# Patient Record
Sex: MALE | Race: BLACK OR AFRICAN AMERICAN | NOT HISPANIC OR LATINO | URBAN - METROPOLITAN AREA
[De-identification: names, ages, dates, MRNs, and addresses within clinical notes are randomized per-mention and may not be internally consistent; named-entity substitution may affect disease eponyms.]

---

## 2024-08-09 ENCOUNTER — EMERGENCY (EMERGENCY)
Facility: HOSPITAL | Age: 64
LOS: 1 days | Discharge: HOME | End: 2024-08-09
Attending: STUDENT IN AN ORGANIZED HEALTH CARE EDUCATION/TRAINING PROGRAM
Payer: SELF-PAY

## 2024-08-09 VITALS
SYSTOLIC BLOOD PRESSURE: 165 MMHG | HEIGHT: 72 IN | TEMPERATURE: 99 F | RESPIRATION RATE: 20 BRPM | OXYGEN SATURATION: 96 % | WEIGHT: 210.98 LBS | HEART RATE: 78 BPM | DIASTOLIC BLOOD PRESSURE: 84 MMHG

## 2024-08-09 VITALS
TEMPERATURE: 98 F | HEART RATE: 69 BPM | DIASTOLIC BLOOD PRESSURE: 91 MMHG | OXYGEN SATURATION: 98 % | RESPIRATION RATE: 18 BRPM | SYSTOLIC BLOOD PRESSURE: 157 MMHG

## 2024-08-09 LAB
ALBUMIN SERPL ELPH-MCNC: 4.5 G/DL — SIGNIFICANT CHANGE UP (ref 3.3–5.2)
ALP SERPL-CCNC: 88 U/L — SIGNIFICANT CHANGE UP (ref 40–120)
ALT FLD-CCNC: 13 U/L — SIGNIFICANT CHANGE UP
ANION GAP SERPL CALC-SCNC: 15 MMOL/L — SIGNIFICANT CHANGE UP (ref 5–17)
APPEARANCE UR: CLEAR — SIGNIFICANT CHANGE UP
AST SERPL-CCNC: 22 U/L — SIGNIFICANT CHANGE UP
BACTERIA # UR AUTO: NEGATIVE /HPF — SIGNIFICANT CHANGE UP
BASOPHILS # BLD AUTO: 0.07 K/UL — SIGNIFICANT CHANGE UP (ref 0–0.2)
BASOPHILS NFR BLD AUTO: 1 % — SIGNIFICANT CHANGE UP (ref 0–2)
BILIRUB SERPL-MCNC: 0.3 MG/DL — LOW (ref 0.4–2)
BILIRUB UR-MCNC: NEGATIVE — SIGNIFICANT CHANGE UP
BUN SERPL-MCNC: 7 MG/DL — LOW (ref 8–20)
CALCIUM SERPL-MCNC: 9.3 MG/DL — SIGNIFICANT CHANGE UP (ref 8.4–10.5)
CAST: 0 /LPF — SIGNIFICANT CHANGE UP (ref 0–4)
CHLORIDE SERPL-SCNC: 95 MMOL/L — LOW (ref 96–108)
CO2 SERPL-SCNC: 24 MMOL/L — SIGNIFICANT CHANGE UP (ref 22–29)
COLOR SPEC: YELLOW — SIGNIFICANT CHANGE UP
CREAT SERPL-MCNC: 0.71 MG/DL — SIGNIFICANT CHANGE UP (ref 0.5–1.3)
DIFF PNL FLD: NEGATIVE — SIGNIFICANT CHANGE UP
EGFR: 103 ML/MIN/1.73M2 — SIGNIFICANT CHANGE UP
EOSINOPHIL # BLD AUTO: 0.22 K/UL — SIGNIFICANT CHANGE UP (ref 0–0.5)
EOSINOPHIL NFR BLD AUTO: 3.2 % — SIGNIFICANT CHANGE UP (ref 0–6)
GLUCOSE SERPL-MCNC: 182 MG/DL — HIGH (ref 70–99)
GLUCOSE UR QL: NEGATIVE MG/DL — SIGNIFICANT CHANGE UP
HCT VFR BLD CALC: 36.3 % — LOW (ref 39–50)
HGB BLD-MCNC: 12.3 G/DL — LOW (ref 13–17)
IMM GRANULOCYTES NFR BLD AUTO: 0.3 % — SIGNIFICANT CHANGE UP (ref 0–0.9)
KETONES UR-MCNC: NEGATIVE MG/DL — SIGNIFICANT CHANGE UP
LEUKOCYTE ESTERASE UR-ACNC: NEGATIVE — SIGNIFICANT CHANGE UP
LYMPHOCYTES # BLD AUTO: 2.12 K/UL — SIGNIFICANT CHANGE UP (ref 1–3.3)
LYMPHOCYTES # BLD AUTO: 30.6 % — SIGNIFICANT CHANGE UP (ref 13–44)
MCHC RBC-ENTMCNC: 28.4 PG — SIGNIFICANT CHANGE UP (ref 27–34)
MCHC RBC-ENTMCNC: 33.9 GM/DL — SIGNIFICANT CHANGE UP (ref 32–36)
MCV RBC AUTO: 83.8 FL — SIGNIFICANT CHANGE UP (ref 80–100)
MONOCYTES # BLD AUTO: 0.71 K/UL — SIGNIFICANT CHANGE UP (ref 0–0.9)
MONOCYTES NFR BLD AUTO: 10.3 % — SIGNIFICANT CHANGE UP (ref 2–14)
NEUTROPHILS # BLD AUTO: 3.78 K/UL — SIGNIFICANT CHANGE UP (ref 1.8–7.4)
NEUTROPHILS NFR BLD AUTO: 54.6 % — SIGNIFICANT CHANGE UP (ref 43–77)
NITRITE UR-MCNC: NEGATIVE — SIGNIFICANT CHANGE UP
PH UR: 6 — SIGNIFICANT CHANGE UP (ref 5–8)
PLATELET # BLD AUTO: 207 K/UL — SIGNIFICANT CHANGE UP (ref 150–400)
POTASSIUM SERPL-MCNC: 4 MMOL/L — SIGNIFICANT CHANGE UP (ref 3.5–5.3)
POTASSIUM SERPL-SCNC: 4 MMOL/L — SIGNIFICANT CHANGE UP (ref 3.5–5.3)
PROT SERPL-MCNC: 7.8 G/DL — SIGNIFICANT CHANGE UP (ref 6.6–8.7)
PROT UR-MCNC: NEGATIVE MG/DL — SIGNIFICANT CHANGE UP
RBC # BLD: 4.33 M/UL — SIGNIFICANT CHANGE UP (ref 4.2–5.8)
RBC # FLD: 11.9 % — SIGNIFICANT CHANGE UP (ref 10.3–14.5)
RBC CASTS # UR COMP ASSIST: 0 /HPF — SIGNIFICANT CHANGE UP (ref 0–4)
SODIUM SERPL-SCNC: 134 MMOL/L — LOW (ref 135–145)
SP GR SPEC: 1.01 — SIGNIFICANT CHANGE UP (ref 1–1.03)
SQUAMOUS # UR AUTO: 0 /HPF — SIGNIFICANT CHANGE UP (ref 0–5)
TROPONIN T, HIGH SENSITIVITY RESULT: 12 NG/L — SIGNIFICANT CHANGE UP (ref 0–51)
TROPONIN T, HIGH SENSITIVITY RESULT: 13 NG/L — SIGNIFICANT CHANGE UP (ref 0–51)
UROBILINOGEN FLD QL: 0.2 MG/DL — SIGNIFICANT CHANGE UP (ref 0.2–1)
WBC # BLD: 6.92 K/UL — SIGNIFICANT CHANGE UP (ref 3.8–10.5)
WBC # FLD AUTO: 6.92 K/UL — SIGNIFICANT CHANGE UP (ref 3.8–10.5)
WBC UR QL: 0 /HPF — SIGNIFICANT CHANGE UP (ref 0–5)

## 2024-08-09 PROCEDURE — 80053 COMPREHEN METABOLIC PANEL: CPT

## 2024-08-09 PROCEDURE — 99285 EMERGENCY DEPT VISIT HI MDM: CPT

## 2024-08-09 PROCEDURE — 36415 COLL VENOUS BLD VENIPUNCTURE: CPT

## 2024-08-09 PROCEDURE — 71046 X-RAY EXAM CHEST 2 VIEWS: CPT | Mod: 26

## 2024-08-09 PROCEDURE — 99053 MED SERV 10PM-8AM 24 HR FAC: CPT

## 2024-08-09 PROCEDURE — 93005 ELECTROCARDIOGRAM TRACING: CPT

## 2024-08-09 PROCEDURE — 87086 URINE CULTURE/COLONY COUNT: CPT

## 2024-08-09 PROCEDURE — 85025 COMPLETE CBC W/AUTO DIFF WBC: CPT

## 2024-08-09 PROCEDURE — 93010 ELECTROCARDIOGRAM REPORT: CPT

## 2024-08-09 PROCEDURE — 81001 URINALYSIS AUTO W/SCOPE: CPT

## 2024-08-09 PROCEDURE — 71046 X-RAY EXAM CHEST 2 VIEWS: CPT

## 2024-08-09 PROCEDURE — 84484 ASSAY OF TROPONIN QUANT: CPT

## 2024-08-09 PROCEDURE — 99285 EMERGENCY DEPT VISIT HI MDM: CPT | Mod: 25

## 2024-08-09 NOTE — ED ADULT NURSE NOTE - OBJECTIVE STATEMENT
patient presents with intermittent chest pain radiating down the abdomen, pt denies sob, palpiations, fever, chills, nausea or vomiting.

## 2024-08-09 NOTE — ED ADULT TRIAGE NOTE - CHIEF COMPLAINT QUOTE
Patient presents to ED with c/o intermittent chest pain radiating down to abdomen times one month.  Per sister, since last night he has been urinating a lot.  Patient recently moved from [facility] June 2024 and patient does not have insurance.  No meds PTA  PMH:  DM, HTN

## 2024-08-09 NOTE — ED PROVIDER NOTE - CARE PLAN
Principal Discharge DX:	Dysuria  Secondary Diagnosis:	Chest pain  Secondary Diagnosis:	Abdominal pain   1

## 2024-08-09 NOTE — ED PROVIDER NOTE - OBJECTIVE STATEMENT
64yo M pmh dm, htn, hld, recent emigration from [facility], presents to ED c/o dysuria and urinary freq/urgency x1m. pt has been taking flagyl x2d given to him by family member but reports sx have worsened. denies hematuria, back pain. pt also c/o central CP which radiates to abdomen x1-2mo. pt reports experiences  episodes of cp and abdominal pain about 4x/w. episodes last for few min and resolve on own. pt was dx with h.pylori few months ago in [facility]. pt reports was given a pill for infection which temporarily improved sx. pt denies fever, cough, nasal congestion, palpitations, SOB, calf/leg pain or edema, orthopnea, NVD, lightheadedness/dizziness, weakness, smoking hx

## 2024-08-09 NOTE — ED PROVIDER NOTE - PHYSICAL EXAMINATION
General: non-toxic appearing, in no acute distress, A+Ox3  CV: RRR, nl s1/s2.  Resp: CTAB, normal rate and effort  GI: Abdomen soft, NT, ND. Active bowel sounds. No rebound, no guarding  : No CVAT.   Skin: No rashes, lacerations, abrasions, ecchymosis. intact and perfused.

## 2024-08-09 NOTE — ED PROVIDER NOTE - ATTENDING APP SHARED VISIT CONTRIBUTION OF CARE
62yo M pmh dm, htn, hld,  here with multiple complaints. Reports dysuria but no hematuria. Also with chest pain/epigastric pain for a few days intermittently in nature. Recently immigrated from [facility] and has not established pmd care here. Reports recently treated for H.pylori. Denies cardiac history  Ap - nontoxic appearing. no complaints currently. will check labs, cardiac enzymes, UA. reassess

## 2024-08-09 NOTE — ED ADULT NURSE NOTE - BOWEL SOUNDS RUQ
Physical Therapy        Physical Therapy Cancel Note      DATE: 3/18/2023    NAME: Blanka Gruber  MRN: [id number]   : 1959      Patient not seen this date for Physical Therapy due to:    Pt not appropriate at this time per RN- pt is agitated.        Electronically signed by Filemon Gaitan PT on 3/18/2023 at 10:50 AM present

## 2024-08-09 NOTE — ED ADULT NURSE NOTE - CHIEF COMPLAINT QUOTE
Patient presents to ED with c/o intermittent chest pain radiating down to abdomen times one month.  Per sister, since last night he has been urinating a lot.  Patient recently moved from [facility] June 2024 and patient does not have insurance.  No meds PTA  PMH:  DM, HTN

## 2024-08-09 NOTE — ED PROVIDER NOTE - NSFOLLOWUPCLINICS_GEN_ALL_ED_FT
[address] [address]  Phone: [phone]  Fax:     Cardiology at [place] ([facility])  Cardiology  [address] [facility] [facility] [address]  [place] [address]  Phone: [phone]  Fax:

## 2024-08-09 NOTE — ED PROVIDER NOTE - PATIENT PORTAL LINK FT
You can access the FollowMyHealth Patient Portal offered by [facility] by registering at the following website: [email or web address]. By joining [facility]’s FollowMyHealth portal, you will also be able to view your health information using other applications (apps) compatible with our system.

## 2024-08-09 NOTE — ED PROVIDER NOTE - CARE PROVIDER_API CALL
Reji Escobar  Gastroenterology  [address] [facility] [facility]  [place] [address]  Phone: [phone]  Fax: [phone]  Follow Up Time:

## 2024-08-10 LAB
CULTURE RESULTS: SIGNIFICANT CHANGE UP
SPECIMEN SOURCE: SIGNIFICANT CHANGE UP

## 2024-10-27 ENCOUNTER — INPATIENT (INPATIENT)
Facility: HOSPITAL | Age: 64
LOS: 13 days | Discharge: HOME | DRG: 645 | End: 2024-11-10
Attending: STUDENT IN AN ORGANIZED HEALTH CARE EDUCATION/TRAINING PROGRAM | Admitting: STUDENT IN AN ORGANIZED HEALTH CARE EDUCATION/TRAINING PROGRAM
Payer: COMMERCIAL

## 2024-10-27 VITALS
SYSTOLIC BLOOD PRESSURE: 158 MMHG | TEMPERATURE: 99 F | RESPIRATION RATE: 18 BRPM | DIASTOLIC BLOOD PRESSURE: 84 MMHG | WEIGHT: 220.02 LBS | OXYGEN SATURATION: 94 % | HEART RATE: 99 BPM | HEIGHT: 68 IN

## 2024-10-27 LAB
ALBUMIN SERPL ELPH-MCNC: 4.3 G/DL — SIGNIFICANT CHANGE UP (ref 3.3–5.2)
ALP SERPL-CCNC: 71 U/L — SIGNIFICANT CHANGE UP (ref 40–120)
ALT FLD-CCNC: 14 U/L — SIGNIFICANT CHANGE UP
ANION GAP SERPL CALC-SCNC: 18 MMOL/L — HIGH (ref 5–17)
APPEARANCE UR: CLEAR — SIGNIFICANT CHANGE UP
APTT BLD: 31.1 SEC — SIGNIFICANT CHANGE UP (ref 24.5–35.6)
AST SERPL-CCNC: 41 U/L — HIGH
BASOPHILS # BLD AUTO: 0.04 K/UL — SIGNIFICANT CHANGE UP (ref 0–0.2)
BASOPHILS NFR BLD AUTO: 0.6 % — SIGNIFICANT CHANGE UP (ref 0–2)
BILIRUB SERPL-MCNC: 0.8 MG/DL — SIGNIFICANT CHANGE UP (ref 0.4–2)
BILIRUB UR-MCNC: NEGATIVE — SIGNIFICANT CHANGE UP
BUN SERPL-MCNC: 11.6 MG/DL — SIGNIFICANT CHANGE UP (ref 8–20)
CALCIUM SERPL-MCNC: 8.9 MG/DL — SIGNIFICANT CHANGE UP (ref 8.4–10.5)
CHLORIDE SERPL-SCNC: 93 MMOL/L — LOW (ref 96–108)
CO2 SERPL-SCNC: 21 MMOL/L — LOW (ref 22–29)
COLOR SPEC: YELLOW — SIGNIFICANT CHANGE UP
CREAT SERPL-MCNC: 1.08 MG/DL — SIGNIFICANT CHANGE UP (ref 0.5–1.3)
DIFF PNL FLD: NEGATIVE — SIGNIFICANT CHANGE UP
EGFR: 77 ML/MIN/1.73M2 — SIGNIFICANT CHANGE UP
EOSINOPHIL # BLD AUTO: 0.41 K/UL — SIGNIFICANT CHANGE UP (ref 0–0.5)
EOSINOPHIL NFR BLD AUTO: 6.6 % — HIGH (ref 0–6)
FLUAV AG NPH QL: SIGNIFICANT CHANGE UP
FLUBV AG NPH QL: SIGNIFICANT CHANGE UP
GLUCOSE SERPL-MCNC: 168 MG/DL — HIGH (ref 70–99)
GLUCOSE UR QL: NEGATIVE MG/DL — SIGNIFICANT CHANGE UP
HCT VFR BLD CALC: 35.6 % — LOW (ref 39–50)
HGB BLD-MCNC: 11.9 G/DL — LOW (ref 13–17)
IMM GRANULOCYTES NFR BLD AUTO: 0.3 % — SIGNIFICANT CHANGE UP (ref 0–0.9)
INR BLD: 1.18 RATIO — HIGH (ref 0.85–1.16)
KETONES UR-MCNC: 15 MG/DL
LEUKOCYTE ESTERASE UR-ACNC: NEGATIVE — SIGNIFICANT CHANGE UP
LYMPHOCYTES # BLD AUTO: 1.09 K/UL — SIGNIFICANT CHANGE UP (ref 1–3.3)
LYMPHOCYTES # BLD AUTO: 17.7 % — SIGNIFICANT CHANGE UP (ref 13–44)
MAGNESIUM SERPL-MCNC: 1.2 MG/DL — LOW (ref 1.6–2.6)
MCHC RBC-ENTMCNC: 28.4 PG — SIGNIFICANT CHANGE UP (ref 27–34)
MCHC RBC-ENTMCNC: 33.4 GM/DL — SIGNIFICANT CHANGE UP (ref 32–36)
MCV RBC AUTO: 85 FL — SIGNIFICANT CHANGE UP (ref 80–100)
MONOCYTES # BLD AUTO: 1.24 K/UL — HIGH (ref 0–0.9)
MONOCYTES NFR BLD AUTO: 20.1 % — HIGH (ref 2–14)
NEUTROPHILS # BLD AUTO: 3.37 K/UL — SIGNIFICANT CHANGE UP (ref 1.8–7.4)
NEUTROPHILS NFR BLD AUTO: 54.7 % — SIGNIFICANT CHANGE UP (ref 43–77)
NITRITE UR-MCNC: NEGATIVE — SIGNIFICANT CHANGE UP
PH UR: 6 — SIGNIFICANT CHANGE UP (ref 5–8)
PLATELET # BLD AUTO: 172 K/UL — SIGNIFICANT CHANGE UP (ref 150–400)
POTASSIUM SERPL-MCNC: 4.4 MMOL/L — SIGNIFICANT CHANGE UP (ref 3.5–5.3)
POTASSIUM SERPL-SCNC: 4.4 MMOL/L — SIGNIFICANT CHANGE UP (ref 3.5–5.3)
PROT SERPL-MCNC: 7.9 G/DL — SIGNIFICANT CHANGE UP (ref 6.6–8.7)
PROT UR-MCNC: SIGNIFICANT CHANGE UP MG/DL
PROTHROM AB SERPL-ACNC: 13.7 SEC — HIGH (ref 9.9–13.4)
RBC # BLD: 4.19 M/UL — LOW (ref 4.2–5.8)
RBC # FLD: 11.6 % — SIGNIFICANT CHANGE UP (ref 10.3–14.5)
RSV RNA NPH QL NAA+NON-PROBE: SIGNIFICANT CHANGE UP
SARS-COV-2 RNA SPEC QL NAA+PROBE: SIGNIFICANT CHANGE UP
SODIUM SERPL-SCNC: 132 MMOL/L — LOW (ref 135–145)
SP GR SPEC: 1.02 — SIGNIFICANT CHANGE UP (ref 1–1.03)
UROBILINOGEN FLD QL: 1 MG/DL — SIGNIFICANT CHANGE UP (ref 0.2–1)
WBC # BLD: 6.17 K/UL — SIGNIFICANT CHANGE UP (ref 3.8–10.5)
WBC # FLD AUTO: 6.17 K/UL — SIGNIFICANT CHANGE UP (ref 3.8–10.5)

## 2024-10-27 PROCEDURE — 99223 1ST HOSP IP/OBS HIGH 75: CPT

## 2024-10-27 PROCEDURE — 70450 CT HEAD/BRAIN W/O DYE: CPT | Mod: 26,59,MC

## 2024-10-27 PROCEDURE — 70498 CT ANGIOGRAPHY NECK: CPT | Mod: 26,MC

## 2024-10-27 PROCEDURE — 71045 X-RAY EXAM CHEST 1 VIEW: CPT | Mod: 26

## 2024-10-27 PROCEDURE — 70496 CT ANGIOGRAPHY HEAD: CPT | Mod: 26,MC

## 2024-10-27 RX ORDER — SODIUM CHLORIDE 9 MG/ML
500 INJECTION, SOLUTION INTRAMUSCULAR; INTRAVENOUS; SUBCUTANEOUS ONCE
Refills: 0 | Status: COMPLETED | OUTPATIENT
Start: 2024-10-27 | End: 2024-10-27

## 2024-10-27 RX ORDER — INSULIN LISPRO 100/ML
VIAL (ML) SUBCUTANEOUS AT BEDTIME
Refills: 0 | Status: DISCONTINUED | OUTPATIENT
Start: 2024-10-27 | End: 2024-11-06

## 2024-10-27 RX ORDER — GLUCAGON INJECTION, SOLUTION 1 MG/.2ML
1 INJECTION, SOLUTION SUBCUTANEOUS ONCE
Refills: 0 | Status: DISCONTINUED | OUTPATIENT
Start: 2024-10-27 | End: 2024-11-06

## 2024-10-27 RX ORDER — INSULIN LISPRO 100/ML
VIAL (ML) SUBCUTANEOUS
Refills: 0 | Status: DISCONTINUED | OUTPATIENT
Start: 2024-10-27 | End: 2024-11-06

## 2024-10-27 RX ORDER — METOPROLOL TARTRATE 50 MG
50 TABLET ORAL
Refills: 0 | Status: DISCONTINUED | OUTPATIENT
Start: 2024-10-27 | End: 2024-11-06

## 2024-10-27 RX ORDER — NIFEDIPINE 90 MG
60 TABLET, EXTENDED RELEASE 24 HR ORAL DAILY
Refills: 0 | Status: DISCONTINUED | OUTPATIENT
Start: 2024-10-27 | End: 2024-11-06

## 2024-10-27 RX ORDER — PANTOPRAZOLE SODIUM 40 MG/1
40 TABLET, DELAYED RELEASE ORAL
Refills: 0 | Status: DISCONTINUED | OUTPATIENT
Start: 2024-10-27 | End: 2024-11-06

## 2024-10-27 RX ORDER — LOSARTAN POTASSIUM 25 MG/1
50 TABLET ORAL DAILY
Refills: 0 | Status: DISCONTINUED | OUTPATIENT
Start: 2024-10-27 | End: 2024-11-06

## 2024-10-27 RX ORDER — MAGNESIUM SULFATE IN 0.9% NACL 2 G/50 ML
2 INTRAVENOUS SOLUTION, PIGGYBACK (ML) INTRAVENOUS ONCE
Refills: 0 | Status: COMPLETED | OUTPATIENT
Start: 2024-10-27 | End: 2024-10-27

## 2024-10-27 RX ORDER — ACETAMINOPHEN 500 MG
650 TABLET ORAL ONCE
Refills: 0 | Status: COMPLETED | OUTPATIENT
Start: 2024-10-27 | End: 2024-10-27

## 2024-10-27 RX ADMIN — Medication 650 MILLIGRAM(S): at 14:52

## 2024-10-27 RX ADMIN — Medication 650 MILLIGRAM(S): at 15:30

## 2024-10-27 RX ADMIN — SODIUM CHLORIDE 500 MILLILITER(S): 9 INJECTION, SOLUTION INTRAMUSCULAR; INTRAVENOUS; SUBCUTANEOUS at 19:00

## 2024-10-27 RX ADMIN — SODIUM CHLORIDE 500 MILLILITER(S): 9 INJECTION, SOLUTION INTRAMUSCULAR; INTRAVENOUS; SUBCUTANEOUS at 18:00

## 2024-10-27 NOTE — ED CDU PROVIDER INITIAL DAY NOTE - NSICDXPASTMEDICALHX_GEN_ALL_CORE_FT
PAST MEDICAL HISTORY:  DM (diabetes mellitus)     GERD (gastroesophageal reflux disease)     H/O: pituitary tumor     HLD (hyperlipidemia)     HTN (hypertension)

## 2024-10-27 NOTE — CONSULT NOTE ADULT - SUBJECTIVE AND OBJECTIVE BOX
HPI:  63M with PMH of DM, HTN, HLD, GERD, on ASA81 daily, [facility], presents with generalized weakness x 1 day. Patient reports difficulty getting out of his chair with unsteady gait since this morning. Patient currently c/o mild headache and burning with urination. Denies any recent falls/trauma, vision changes, dizziness, nausea/vomiting, numbness, or tingling. 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    PAST MEDICAL & SURGICAL HISTORY:  DM  HTN  HLD  GERD    Allergies  No Known Allergies    REVIEW OF SYSTEMS  Negative except as noted in HPI    HOME MEDICATIONS:  Home Medications:  Nifedipine 20 daily  Metformin 500 daily  Simvastatin 20 daily  Losartan 50 daily    Vital Signs Last 24 Hrs  T(C): 37.1 (27 Oct 2024 16:37), Max: 37.2 (27 Oct 2024 12:17)  T(F): 98.7 (27 Oct 2024 16:37), Max: 98.9 (27 Oct 2024 12:17)  HR: 98 (27 Oct 2024 16:37) (98 - 99)  BP: 130/68 (27 Oct 2024 16:37) (130/68 - 158/84)  BP(mean): --  RR: 16 (27 Oct 2024 16:37) (16 - 18)  SpO2: 96% (27 Oct 2024 16:37) (94% - 96%)    Parameters below as of 27 Oct 2024 16:37  Patient On (Oxygen Delivery Method): room air    PHYSICAL EXAM:  GENERAL: NAD  HEAD: Atraumatic, normocephalic  AMARIS COMA SCORE: E-4 V-5 M-6 = 15  MENTAL STATUS: AAO x3; Awake; Opens eyes spontaneously; Appropriately conversant without aphasia; following simple commands  CRANIAL NERVES: Visual fields full to confrontation, PERRL. EOMI without nystagmus. Facial sensation intact V1-3 distribution b/l. Face symmetric w/ normal eye closure and smile, tongue midline. Hearing grossly intact. Speech clear.  MOTOR: strength 5/5 b/l upper and lower extremities  SENSATION: grossly intact to light touch all extremities  CHEST/LUNG: Nonlabored breathing  SKIN: Warm, dry    LABS:                        11.9   6.17  )-----------( 172      ( 27 Oct 2024 13:59 )             35.6     10-27    132[L]  |  93[L]  |  11.6  ----------------------------<  168[H]  4.4   |  21.0[L]  |  1.08    Ca    8.9      27 Oct 2024 13:59  Mg     1.2     10-27    TPro  7.9  /  Alb  4.3  /  TBili  0.8  /  DBili  x   /  AST  41[H]  /  ALT  14  /  AlkPhos  71  10-27    PT/INR - ( 27 Oct 2024 16:49 )   PT: 13.7 sec;   INR: 1.18 ratio      PTT - ( 27 Oct 2024 16:49 )  PTT:31.1 sec  Urinalysis Basic - ( 27 Oct 2024 16:49 )    Color: Yellow / Appearance: Clear / S.016 / pH: x  Gluc: x / Ketone: 15 mg/dL  / Bili: Negative / Urobili: 1.0 mg/dL   Blood: x / Protein: Trace mg/dL / Nitrite: Negative   Leuk Esterase: Negative / RBC: x / WBC x   Sq Epi: x / Non Sq Epi: x / Bacteria: x    RADIOLOGY & ADDITIONAL STUDIES:  < from: CT Head No Cont (10.27.24 @ 17:03) >  IMPRESSION:    1.  Large pituitary tumor with chiasmatic encroachment, consider   pituitary MRI.  2.  Invasion of the cavernous sinuses and sphenoid sinuses.  3.  Incidental vascular lesion dorsal to the right thyroid.

## 2024-10-27 NOTE — ED ADULT NURSE NOTE - NSFALLUNIVINTERV_ED_ALL_ED
Bed/Stretcher in lowest position, wheels locked, appropriate side rails in place/Call bell, personal items and telephone in reach/Instruct patient to call for assistance before getting out of bed/chair/stretcher/Non-slip footwear applied when patient is off stretcher/[place] to call system/Physically safe environment - no spills, clutter or unnecessary equipment/Purposeful proactive rounding/Room/bathroom lighting operational, light cord in reach

## 2024-10-27 NOTE — ED ADULT NURSE REASSESSMENT NOTE - NURSING NEURO LEVEL OF CONSCIOUSNESS
This writer attempted to contact patient on 03/01/23      Reason for call relay provider message and left message.      If patient calls back:   Registered Nurse called. Route to BK RN line, relay provider message below stating that since specialist recommended CT, PCP is deferring to specialist for preferred scan. OK with CT. CT already ordered, call imaging scheduling at [phone] to schedule CT        LUAN Franks, RN  [facility]     follows commands

## 2024-10-27 NOTE — ED PROVIDER NOTE - CLINICAL SUMMARY MEDICAL DECISION MAKING FREE TEXT BOX
Ddx: NIHSS 0/ no evidence of cva/ ro occult infection/ progression of known tumor  Plan: Cbc, cmp, CT head, cta head and neck, cxr, ua, reassess

## 2024-10-27 NOTE — ED ADULT NURSE NOTE - OBJECTIVE STATEMENT
Pt A&Ox4, NAD. Pt presents to the ED from home with complaints of generalized weakness. Breathing even and unlabored. ED workup in progress.

## 2024-10-27 NOTE — ED PROVIDER NOTE - OBJECTIVE STATEMENT
Patient is a 63-year-old gentleman with past medical history of hypertension, hyperlipidemia, diabetes, known brain tumor who presents to the ED because of generalized weakness.  Patient sister says that he was too weak to get off the floor this morning.  Denies headache, denies fever, denies chest pain, shortness of breath, or abdominal pain.  No history of stroke in the past.  No slurred speech, no facial droop, no unilateral weakness.

## 2024-10-27 NOTE — ED CDU PROVIDER INITIAL DAY NOTE - OBJECTIVE STATEMENT
62 yo male PMHx HTN, HLD, NIDDM2, GERD, known brain tumor who presents to the ED because of generalized weakness. Patient sister says that he was too weak to get off the floor this morning. No further complaints at this time.   Denies headache, denies fever, denies chest pain, shortness of breath, or abdominal pain.  No history of stroke in the past. No slurred speech, no facial droop, no unilateral weakness. 62 yo male PMHx HTN, HLD, NIDDM2, GERD, known brain tumor who presents to the ED because of generalized weakness. Patient sister says that he was too weak to get off the floor this morning. Also c/o dysuria x 2 months. No further complaints at this time.   Denies headache, denies fever, denies chest pain, shortness of breath, or abdominal pain.  No history of stroke in the past. No slurred speech, no facial droop, no unilateral weakness.

## 2024-10-27 NOTE — ED ADULT NURSE REASSESSMENT NOTE - NSFALLRISKINTERV_ED_ALL_ED

## 2024-10-27 NOTE — CONSULT NOTE ADULT - NS ATTEND AMEND GEN_ALL_CORE FT
SU Attg:    see above    patient seen and examined by PA staff    imaging reviewed  no ICH/apoplexy    agree with above  additional evaluation and treatment per Dr. Harmon

## 2024-10-27 NOTE — ED ADULT TRIAGE NOTE - CHIEF COMPLAINT QUOTE
pt BIBA from home, as per EMS family called c/o of weakness. pt creole speaking only states he woke up this morning with extreme weakness. denies sick contact or recent travel.

## 2024-10-27 NOTE — ED CDU PROVIDER INITIAL DAY NOTE - CLINICAL SUMMARY MEDICAL DECISION MAKING FREE TEXT BOX
64 yo male PMHx HTN, HLD, NIDDM2, GERD, known brain tumor who presents to the ED because of generalized weakness. Lab work significant for hypomagnesemia, given 2g; new EKG abnormalities, initial troponin 27. pending repeat. Additionally on CT patient with large pituitary tumor. Neurosurgery consulted - recommending MRI and endocrine evaluation. Patient placed in OBS.

## 2024-10-27 NOTE — ED PROVIDER NOTE - PROGRESS NOTE DETAILS
Pt feeling better, ambulating in ED. Pt has brain tumor, and neurosurgery consulted. Recommends MRI and endocrine consult.

## 2024-10-27 NOTE — CONSULT NOTE ADULT - ASSESSMENT
63M with PMH of DM, HTN, HLD, GERD, on ASA81 daily, known history of pituitary tumor diagnosed 8 years ago in [facility] with no follow up since then, presents with generalized weakness x 1 day. Patient reports difficulty getting out of his chair with unsteady gait since this morning. Patient currently c/o mild headache and burning with urination. Denies any recent falls/trauma, vision changes, dizziness, nausea/vomiting, numbness, or tingling. 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    Plan:  - Q4 neuro checks  - SBP goal <160  - All imaging reviewed: CTH shows 2.7 x 2.7 x 3.9 cm pituitary mass with chiasmatic encroachment  - Recommend MRI Brain w/wo with pituitary focus   - Recommend Endocrine consult  - Endocrine labs (GH, Prolactin, Thyroid, ACTH, 8AM Cortisol) and BMP  - Hold steroids until 8AM Cortisol   - Obtain UA and urine culture for urinary sx  - Obtain ASA response test for ASA81 use  - Hold ASA  - DVT ppx: SCDs only  - Discussed with Dr. Mclean

## 2024-10-28 ENCOUNTER — RESULT REVIEW (OUTPATIENT)
Age: 64
End: 2024-10-28

## 2024-10-28 DIAGNOSIS — E23.6 OTHER DISORDERS OF PITUITARY GLAND: ICD-10-CM

## 2024-10-28 DIAGNOSIS — R94.31 ABNORMAL ELECTROCARDIOGRAM [ECG] [EKG]: ICD-10-CM

## 2024-10-28 DIAGNOSIS — Z01.810 ENCOUNTER FOR PREPROCEDURAL CARDIOVASCULAR EXAMINATION: ICD-10-CM

## 2024-10-28 LAB
ACTH SER-ACNC: 36.5 PG/ML — SIGNIFICANT CHANGE UP (ref 7.2–63.3)
ANION GAP SERPL CALC-SCNC: 13 MMOL/L — SIGNIFICANT CHANGE UP (ref 5–17)
BUN SERPL-MCNC: 11.2 MG/DL — SIGNIFICANT CHANGE UP (ref 8–20)
CALCIUM SERPL-MCNC: 8.3 MG/DL — LOW (ref 8.4–10.5)
CHLORIDE SERPL-SCNC: 100 MMOL/L — SIGNIFICANT CHANGE UP (ref 96–108)
CO2 SERPL-SCNC: 25 MMOL/L — SIGNIFICANT CHANGE UP (ref 22–29)
CORTIS SP2 SERPL-MCNC: 13.4 UG/DL — SIGNIFICANT CHANGE UP
CREAT SERPL-MCNC: 0.77 MG/DL — SIGNIFICANT CHANGE UP (ref 0.5–1.3)
EGFR: 101 ML/MIN/1.73M2 — SIGNIFICANT CHANGE UP
GH SERPL-MCNC: 0.05 NG/ML — SIGNIFICANT CHANGE UP (ref 0.03–2.47)
GLUCOSE BLDC GLUCOMTR-MCNC: 168 MG/DL — HIGH (ref 70–99)
GLUCOSE BLDC GLUCOMTR-MCNC: 175 MG/DL — HIGH (ref 70–99)
GLUCOSE BLDC GLUCOMTR-MCNC: 178 MG/DL — HIGH (ref 70–99)
GLUCOSE BLDC GLUCOMTR-MCNC: 276 MG/DL — HIGH (ref 70–99)
GLUCOSE SERPL-MCNC: 162 MG/DL — HIGH (ref 70–99)
PLATELET RESPONSE ASPIRIN RESULT: 473 ARU — SIGNIFICANT CHANGE UP
POTASSIUM SERPL-MCNC: 3.3 MMOL/L — LOW (ref 3.5–5.3)
POTASSIUM SERPL-SCNC: 3.3 MMOL/L — LOW (ref 3.5–5.3)
PROLACTIN SERPL-MCNC: 3.6 NG/ML — LOW (ref 4.1–18.4)
PROLACTIN SERPL-MCNC: 5.2 NG/ML — SIGNIFICANT CHANGE UP (ref 4.1–18.4)
SODIUM SERPL-SCNC: 138 MMOL/L — SIGNIFICANT CHANGE UP (ref 135–145)
TROPONIN T, HIGH SENSITIVITY RESULT: 22 NG/L — SIGNIFICANT CHANGE UP (ref 0–51)
TSH SERPL-MCNC: 0.38 UIU/ML — SIGNIFICANT CHANGE UP (ref 0.27–4.2)

## 2024-10-28 PROCEDURE — 99233 SBSQ HOSP IP/OBS HIGH 50: CPT

## 2024-10-28 PROCEDURE — 75574 CT ANGIO HRT W/3D IMAGE: CPT | Mod: 26,MC

## 2024-10-28 PROCEDURE — 70553 MRI BRAIN STEM W/O & W/DYE: CPT | Mod: 26,MC

## 2024-10-28 PROCEDURE — 99223 1ST HOSP IP/OBS HIGH 75: CPT

## 2024-10-28 PROCEDURE — 93306 TTE W/DOPPLER COMPLETE: CPT | Mod: 26

## 2024-10-28 RX ORDER — POLYETHYLENE GLYCOL 3350 17 G/17G
17 POWDER, FOR SOLUTION ORAL DAILY
Refills: 0 | Status: DISCONTINUED | OUTPATIENT
Start: 2024-10-28 | End: 2024-11-06

## 2024-10-28 RX ORDER — OXYCODONE HYDROCHLORIDE 30 MG/1
5 TABLET ORAL EVERY 6 HOURS
Refills: 0 | Status: DISCONTINUED | OUTPATIENT
Start: 2024-10-28 | End: 2024-10-28

## 2024-10-28 RX ORDER — METFORMIN HYDROCHLORIDE 500 MG/1
1 TABLET, EXTENDED RELEASE ORAL
Refills: 0 | DISCHARGE

## 2024-10-28 RX ORDER — LOSARTAN POTASSIUM 25 MG/1
1 TABLET ORAL
Refills: 0 | DISCHARGE

## 2024-10-28 RX ORDER — METOPROLOL TARTRATE 50 MG
1 TABLET ORAL
Refills: 0 | DISCHARGE

## 2024-10-28 RX ORDER — SODIUM CHLORIDE 9 MG/ML
3 INJECTION, SOLUTION INTRAMUSCULAR; INTRAVENOUS; SUBCUTANEOUS EVERY 8 HOURS
Refills: 0 | Status: DISCONTINUED | OUTPATIENT
Start: 2024-10-28 | End: 2024-11-06

## 2024-10-28 RX ORDER — SENNA 187 MG
2 TABLET ORAL AT BEDTIME
Refills: 0 | Status: DISCONTINUED | OUTPATIENT
Start: 2024-10-28 | End: 2024-11-06

## 2024-10-28 RX ORDER — NIFEDIPINE 90 MG
1 TABLET, EXTENDED RELEASE 24 HR ORAL
Refills: 0 | DISCHARGE

## 2024-10-28 RX ORDER — ACETAMINOPHEN 500 MG
650 TABLET ORAL EVERY 6 HOURS
Refills: 0 | Status: DISCONTINUED | OUTPATIENT
Start: 2024-10-28 | End: 2024-11-06

## 2024-10-28 RX ORDER — B-COMPLEX WITH VITAMIN C
1 VIAL (ML) INJECTION DAILY
Refills: 0 | Status: DISCONTINUED | OUTPATIENT
Start: 2024-10-28 | End: 2024-11-06

## 2024-10-28 RX ORDER — INFLUENZ VIR VAC TV P-SURF2003 15MCG/.5ML
0.5 SYRINGE (ML) INTRAMUSCULAR ONCE
Refills: 0 | Status: DISCONTINUED | OUTPATIENT
Start: 2024-10-28 | End: 2024-11-10

## 2024-10-28 RX ADMIN — SODIUM CHLORIDE 3 MILLILITER(S): 9 INJECTION, SOLUTION INTRAMUSCULAR; INTRAVENOUS; SUBCUTANEOUS at 22:12

## 2024-10-28 RX ADMIN — Medication 2 TABLET(S): at 22:21

## 2024-10-28 RX ADMIN — Medication 2: at 22:20

## 2024-10-28 RX ADMIN — Medication 2: at 17:50

## 2024-10-28 RX ADMIN — Medication 20 MILLIGRAM(S): at 22:21

## 2024-10-28 RX ADMIN — Medication 650 MILLIGRAM(S): at 04:26

## 2024-10-28 RX ADMIN — Medication 60 MILLIGRAM(S): at 06:26

## 2024-10-28 RX ADMIN — Medication 50 MILLIGRAM(S): at 19:43

## 2024-10-28 RX ADMIN — Medication 25 GRAM(S): at 01:01

## 2024-10-28 RX ADMIN — Medication 650 MILLIGRAM(S): at 09:19

## 2024-10-28 RX ADMIN — LOSARTAN POTASSIUM 50 MILLIGRAM(S): 25 TABLET ORAL at 06:26

## 2024-10-28 RX ADMIN — Medication 50 MILLIGRAM(S): at 06:26

## 2024-10-28 RX ADMIN — Medication 50 MILLIGRAM(S): at 01:01

## 2024-10-28 NOTE — ED CDU PROVIDER DISPOSITION NOTE - CLINICAL COURSE
64 yo male PMHx HTN, HLD, NIDDM2, GERD, known brain tumor placed in OBS after initially presenting to the ED because of generalized weakness. Lab work significant for hypomagnesemia, given 2g; new EKG abnormalities. Cardiology consulted - pending TTE and potential further ischemic eval. Additionally on CT patient with large pituitary tumor. Neurosurgery consulted - recommended MRI and endocrine evaluation. MRI showed Large pituitary tumor that expands and distorts the sella and elevates the optic chiasm. 62 yo male PMHx HTN, HLD, NIDDM2, GERD, known pituitary tumor placed in OBS after initially presenting to the ED because of generalized weakness. Lab work significant for hypomagnesemia, given 2g; found to have new EKG abnormalities. Cardiology consulted - recommended TTE and CTCA. Additionally on CT patient with large pituitary tumor. Neurosurgery consulted - recommended MRI and endocrine evaluation. MRI showed Large pituitary tumor that expands and distorts the sella and elevates the optic chiasm. Neurosurgery planning for surgery on this stay pending cardiology clearance. TTE and CTCA grossly unremarkable, pt cleared by cardiology. pt admitted to neurosurgery service.

## 2024-10-28 NOTE — PROGRESS NOTE ADULT - SUBJECTIVE AND OBJECTIVE BOX
INTERVAL HPI/OVERNIGHT EVENTS:  This is a 63ym presents with a known hx of pituitary tumor dx 8 years prior.  Pt presents with generalized weakness x 1 day. Pt had mild headache and burning with urination.    MEDICATIONS  (STANDING):  atorvastatin 20 milliGRAM(s) Oral at bedtime  dextrose 5%. 1000 milliLiter(s) (100 mL/Hr) IV Continuous <Continuous>  dextrose 5%. 1000 milliLiter(s) (50 mL/Hr) IV Continuous <Continuous>  dextrose 50% Injectable 12.5 Gram(s) IV Push once  dextrose 50% Injectable 25 Gram(s) IV Push once  dextrose 50% Injectable 25 Gram(s) IV Push once  glucagon  Injectable 1 milliGRAM(s) IntraMuscular once  insulin lispro (ADMELOG) corrective regimen sliding scale   SubCutaneous three times a day before meals  insulin lispro (ADMELOG) corrective regimen sliding scale   SubCutaneous at bedtime  losartan 50 milliGRAM(s) Oral daily  metoprolol succinate ER 50 milliGRAM(s) Oral two times a day  NIFEdipine XL 60 milliGRAM(s) Oral daily  pantoprazole    Tablet 40 milliGRAM(s) Oral before breakfast    MEDICATIONS  (PRN):  acetaminophen     Tablet .. 650 milliGRAM(s) Oral every 6 hours PRN Moderate Pain (4 - 6)  dextrose Oral Gel 15 Gram(s) Oral once PRN Blood Glucose LESS THAN 70 milliGRAM(s)/deciliter      Allergies  No Known Allergies  Intolerances      Vital Signs Last 24 Hrs  T(C): 36.4 (28 Oct 2024 07:55), Max: 37.2 (27 Oct 2024 12:17)  T(F): 97.5 (28 Oct 2024 07:55), Max: 98.9 (27 Oct 2024 12:17)  HR: 61 (28 Oct 2024 07:55) (61 - 99)  BP: 158/86 (28 Oct 2024 07:55) (130/68 - 162/88)  BP(mean): --  RR: 18 (28 Oct 2024 07:55) (16 - 19)  SpO2: 95% (28 Oct 2024 07:55) (94% - 96%)    Parameters below as of 28 Oct 2024 07:55  Patient On (Oxygen Delivery Method): room air     BMI (kg/m2): 33.5 (10-27-24 @ 12:17)      PHYSICAL EXAM  GENERAL: NAD, Pt spoken to through interpretator  HEAD:  Normocephalic  EYES: EOMI, PERRLA, conjunctiva and sclera clear, vision grossly intact.    ENMT: No tonsillar erythema, exudates, or enlargement; Moist mucous membranes, Good dentition, neg facial, midline tongue  NECK: Supple,   NERVOUS SYSTEM:  Alert & Oriented X3, Good concentration; Motor Strength 5/5 B/L upper and lower extremities; DTRs 2+ intact and symmetric  CHEST/LUNG: Clear bilaterally;  HEART: Regular rate and rhythm; No murmurs, rubs, or gallops  ABDOMEN: Soft, Nontender, Nondistended; Bowel sounds present  EXTREMITIES:  2+ Peripheral Pulses, No edema  SKIN: No rashes or lesions      LABS:                          11.9   6.17  )-----------( 172      ( 27 Oct 2024 13:59 )             35.6     10-28    138  |  100  |  11.2  ----------------------------<  162[H]  3.3[L]   |  25.0  |  0.77    Ca    8.3[L]      28 Oct 2024 06:30  Mg     1.2     10-27    TPro  7.9  /  Alb  4.3  /  TBili  0.8  /  DBili  x   /  AST  41[H]  /  ALT  14  /  AlkPhos  71  10-27    PT/INR - ( 27 Oct 2024 16:49 )   PT: 13.7 sec;   INR: 1.18 ratio         PTT - ( 27 Oct 2024 16:49 )  PTT:31.1 sec  Urinalysis Basic - ( 28 Oct 2024 06:30 )    Color: x / Appearance: x / SG: x / pH: x  Gluc: 162 mg/dL / Ketone: x  / Bili: x / Urobili: x   Blood: x / Protein: x / Nitrite: x   Leuk Esterase: x / RBC: x / WBC x   Sq Epi: x / Non Sq Epi: x / Bacteria: x    Urinalysis (10.27.24 @ 16:49)    Glucose Qualitative, Urine: Negative mg/dL   Blood, Urine: Negative   pH Urine: 6.0   Color: Yellow   Urine Appearance: Clear   Bilirubin: Negative   Ketone - Urine: 15 mg/dL   Specific Gravity: 1.016   Protein, Urine: Trace mg/dL   Urobilinogen: 1.0 mg/dL   Nitrite: Negative   Leukocyte Esterase Concentration: Negative      I&O's Detail    RADIOLOGY & ADDITIONAL TESTS:  < from: CT Head No Cont (10.27.24 @ 17:03) >    CT BRAIN, CT ANGIO NECK (W)AW IC   ORDERED     CT ANGIO BRAIN (W)AW IC     PROCEDURE DATE:  10/27/2024    IMPRESSION:  1.  Large pituitary tumor with chiasmatic encroachment, consider   pituitary MRI.  2.  Invasion of the cavernous sinuses and sphenoid sinuses.  3.  Incidental vascular lesion dorsal to the right thyroid.  --- End of Report ---    < from: MR Head w/wo IV Cont (10.28.24 @ 07:26) >  IMPRESSION:  1. 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    2. BRAIN:   Ischemic white matter disease greater than typical for age.   Diffuse brain volume loss typical for age.    --- End ofReport ---        < end of copied text >

## 2024-10-28 NOTE — PROGRESS NOTE ADULT - ASSESSMENT
This is a 63ym presents with a hx of pituitary mass. Pt gives a hx of visual issues.  Pt also states that he does have generalized weakness and urinary sxs  PMHx HTN, HLD, DM, H. Pylori (treated in [facility] earlier this year) pituitary tumor  IMP:  Large pituitary mass 4.8cm ht and 3.3 cm AP x 82.9 cm  with sellar distortion    Plan  -pt admitted and visual fields being considered for evaluation. Obtaining  neuro ophthalmology consulted.  -labs ordered for growth hormone, cortisol, prolactin,   -Endocrine consulted.  -Cardiology noted appreciated with the patient having an abnorm EKG will need TWI in II, V3-V6 suggestive of anterolateral ischemia, patient noted to have Chest pressure with climbing stairs.  The following is being recommended by the cardiology service Serial CE, EKG, TTE, CCTA planned.  -Pt seen with Dr Harmon this am planning potential intervention yet the patient will needs cards and medical clearance.

## 2024-10-28 NOTE — H&P ADULT - HISTORY OF PRESENT ILLNESS
HPI: 63M with PMH of DM, HTN, HLD, GERD, on ASA81 daily, known history of pituitary tumor diagnosed 8 years ago in [place] with no follow up since then, presents with generalized weakness x 1 day. Patient reports difficulty getting out of his chair with unsteady gait since this morning. Patient currently c/o mild headache and burning with urination. Denies any recent falls/trauma, vision changes, dizziness, nausea/vomiting, numbness, or tingling. 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 Pt also noted to have TWI on EKG, cards consulted and further w/u pending. Pt reportedly claims to be compliant on home cardiac medications. Pt admitted to neurosurgery for likely surgical resection of pituitary tumor with Dr. Harmon.    PAST MEDICAL & SURGICAL HISTORY:  HTN (hypertension)  HLD (hyperlipidemia)  DM (diabetes mellitus)  GERD (gastroesophageal reflux disease)  H/O: pituitary tumor    No significant past surgical history      FAMILY HISTORY:  No pertinent family history in first degree relatives    SOCIAL HISTORY:  Tobacco Use:  EtOH use:   Substance:    Allergies  No Known Allergies  Intolerances    REVIEW OF SYSTEMS  As noted in HPI    HOME MEDICATIONS:  Home Medications:  atorvastatin 20 mg oral tablet: 1 tab(s) orally once a day (at bedtime) (28 Oct 2024 16:14)  losartan 50 mg oral tablet: 1 tab(s) orally once a day (28 Oct 2024 16:14)  metFORMIN 500 mg oral tablet: 1 tab(s) orally 2 times a day (28 Oct 2024 16:14)  metoprolol succinate 50 mg oral tablet, extended release: 1 tab(s) orally 2 times a day (28 Oct 2024 16:14)  NIFEdipine (Eqv-Adalat CC) 60 mg oral tablet, extended release: 1 tab(s) orally once a day (28 Oct 2024 16:14)      MEDICATIONS:  Antibiotics:    Neuro:  acetaminophen     Tablet .. 650 milliGRAM(s) Oral every 6 hours PRN  oxyCODONE    IR 5 milliGRAM(s) Oral every 6 hours PRN    Anticoagulation:    OTHER:  atorvastatin 20 milliGRAM(s) Oral at bedtime  dextrose 50% Injectable 25 Gram(s) IV Push once  dextrose 50% Injectable 25 Gram(s) IV Push once  dextrose 50% Injectable 12.5 Gram(s) IV Push once  dextrose Oral Gel 15 Gram(s) Oral once PRN  glucagon  Injectable 1 milliGRAM(s) IntraMuscular once  insulin lispro (ADMELOG) corrective regimen sliding scale   SubCutaneous three times a day before meals  insulin lispro (ADMELOG) corrective regimen sliding scale   SubCutaneous at bedtime  losartan 50 milliGRAM(s) Oral daily  metoprolol succinate ER 50 milliGRAM(s) Oral two times a day  NIFEdipine XL 60 milliGRAM(s) Oral daily  pantoprazole    Tablet 40 milliGRAM(s) Oral before breakfast  polyethylene glycol 3350 17 Gram(s) Oral daily  senna 2 Tablet(s) Oral at bedtime    IVF:  dextrose 5%. 1000 milliLiter(s) IV Continuous <Continuous>  dextrose 5%. 1000 milliLiter(s) IV Continuous <Continuous>  multivitamin 1 Tablet(s) Oral daily  sodium chloride 0.9% lock flush 3 milliLiter(s) IV Push every 8 hours      Vital Signs Last 24 Hrs  T(C): 36.7 (28 Oct 2024 15:19), Max: 37.1 (27 Oct 2024 16:37)  T(F): 98.1 (28 Oct 2024 15:19), Max: 98.7 (27 Oct 2024 16:37)  HR: 68 (28 Oct 2024 15:19) (61 - 98)  BP: 138/88 (28 Oct 2024 15:19) (130/68 - 162/88)  BP(mean): --  RR: 19 (28 Oct 2024 15:19) (16 - 19)  SpO2: 92% (28 Oct 2024 15:19) (92% - 96%)    Parameters below as of 28 Oct 2024 07:55  Patient On (Oxygen Delivery Method): room air    PHYSICAL EXAM:  GENERAL: NAD, well-groomed  HEAD:  Atraumatic, normocephalic  DRAINS:   WOUND: Dressing clean dry intact; well healed  SHUNT: easily compressible and refills  EYES: Conjunctiva and sclera clear; corneal reflex intact  ENMT: No tonsillar erythema, exudates, or enlargement; moist mucous membranes, good dentition, no lesions  NECK: Supple, nontender to palpation  AMARIS COMA SCORE: E- V- M- =       E: 4= opens eyes spontaneously 3= to voice 2= to noxious 1= no opening       V: 5= oriented 4= confused 3= inappropriate words 2= incomprehensible sounds 1= nonverbal 1T= intubated       M: 6= follows commands 5= localizes 4= withdraws 3= flexor posturing 2= extensor posturing 1= no movement  MENTAL STATUS: AAO x3; Awake/Comatose; Opens eyes spontaneously/to voice/to light touch/to noxious stimuli; Appropriately conversant without aphasia/Nonverbal; following simple commands/mimicking/not following commands  CRANIAL NERVES: Visual acuity normal for age, visual fields full to confrontation, PERRL. EOMI without nystagmus. Facial sensation intact V1-3 distribution b/l. Face symmetric w/ normal eye closure and smile, tongue midline. Hearing grossly intact. Speech clear. Head turning and shoulder shrug intact.   REFLEXES: PERRL. Corneals intact b/l. Gag intact. Cough intact. Oculocephalic reflex intact (Doll's eye). Negative Sr's b/l. Negative clonus b/l  MOTOR: strength 5/5 b/l upper and lower extremities  Uppers     Delt (C5/6)     Bicep (C5/6)     Wrist Extend (C6)     Tricep (C7)     HG (C8/T1)  R                     5/5                 5/5                         5/5                           5/5                   5/5  L                      5/5                 5/5                         5/5                           5/5                   5/5  Lowers      HF(L1/L2)     KE (L3)     DF (L4)     EHL (L5)     PF (S1)      R                     5/5              5/5           5/5           5/5            5/5  L                     5/5               5/5          5/5            5/5            5/5  SENSATION: grossly intact to light touch all extremities  COORDINATION: Gait intact; rapid alternating movements intact b/l upper extremities; no upper extremity dysmetria  MUSCLE STRETCH REFLEXES: DTRs 2+ intact and symmetric  PLANTAR: upgoing/downgoing/mute (Babinski)  CHEST/LUNG: Clear to auscultation bilaterally; no rales, rhonchi, wheezing, or rubs  HEART: +S1/+S2; Regular rate and rhythm; no murmurs, rubs, or gallops  ABDOMEN: Soft, nontender, nondistended; bowel sounds present all four quadrants  EXTREMITIES:  2+ peripheral pulses, no clubbing, cyanosis, or edema  LYMPH: No lymphadenopathy noted  SKIN: Warm, dry; no rashes or lesions    LABS:                        11.9   6.17  )-----------( 172      ( 27 Oct 2024 13:59 )             35.6     10-28    138  |  100  |  11.2  ----------------------------<  162[H]  3.3[L]   |  25.0  |  0.77    Ca    8.3[L]      28 Oct 2024 06:30  Mg     1.2     10-27    TPro  7.9  /  Alb  4.3  /  TBili  0.8  /  DBili  x   /  AST  41[H]  /  ALT  14  /  AlkPhos  71  10-27    PT/INR - ( 27 Oct 2024 16:49 )   PT: 13.7 sec;   INR: 1.18 ratio         PTT - ( 27 Oct 2024 16:49 )  PTT:31.1 sec  Urinalysis Basic - ( 28 Oct 2024 06:30 )    Color: x / Appearance: x / SG: x / pH: x  Gluc: 162 mg/dL / Ketone: x  / Bili: x / Urobili: x   Blood: x / Protein: x / Nitrite: x   Leuk Esterase: x / RBC: x / WBC x   Sq Epi: x / Non Sq Epi: x / Bacteria: x      CULTURES:      RADIOLOGY & ADDITIONAL STUDIES:    < from: MR Sella alone w/wo IV Cont (10.28.24 @ 07:26) >  IMPRESSION:    1. 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    2. BRAIN:   Ischemic white matter disease greater than typical for age.   Diffuse brain volume loss typical for age.    < from: CT Angio Cardiac w/ IV Cont (10.28.24 @ 09:06) >  Impression:    Cardiac:  1. There is minimal non-obstructive coronary artery disease (CAD) of the   mid LAD, mid LCx and mid RCA consistent with CAD-RADS 1. Co-dominant   coronary circulation (PDA originates from RCA and PLV originates from   LCx).  2. Observed calcium score 58 is at percentile 52  for individuals of same   age, gender, and race/ethnicity who are free of clinical cardiovascular   disease and treated diabetes mellitus ([facility] NIH database).  3. Aortic valve with trivial calcification.  4. Mild sinotubular junction calcification.     HPI: 63M with PMH of DM, HTN, HLD, GERD, on ASA81 daily, known history of pituitary tumor diagnosed 8 years ago in [place] with no follow up since then, presents with generalized weakness x 1 day. Patient reports difficulty getting out of his chair with unsteady gait since this morning. Patient currently c/o mild headache and burning with urination. Denies any recent falls/trauma, vision changes, dizziness, nausea/vomiting, numbness, or tingling. 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 Pt also noted to have TWI on EKG, cards consulted and further w/u pending. Pt reportedly claims to be compliant on home cardiac medications. Pt admitted to neurosurgery for likely surgical resection of pituitary tumor with Dr. Harmon.    PAST MEDICAL & SURGICAL HISTORY:  HTN (hypertension)  HLD (hyperlipidemia)  DM (diabetes mellitus)  GERD (gastroesophageal reflux disease)  H/O: pituitary tumor    No significant past surgical history      FAMILY HISTORY:  No pertinent family history in first degree relatives      Allergies  No Known Allergies  Intolerances    REVIEW OF SYSTEMS  As noted in HPI    HOME MEDICATIONS:  Home Medications:  atorvastatin 20 mg oral tablet: 1 tab(s) orally once a day (at bedtime) (28 Oct 2024 16:14)  losartan 50 mg oral tablet: 1 tab(s) orally once a day (28 Oct 2024 16:14)  metFORMIN 500 mg oral tablet: 1 tab(s) orally 2 times a day (28 Oct 2024 16:14)  metoprolol succinate 50 mg oral tablet, extended release: 1 tab(s) orally 2 times a day (28 Oct 2024 16:14)  NIFEdipine (Eqv-Adalat CC) 60 mg oral tablet, extended release: 1 tab(s) orally once a day (28 Oct 2024 16:14)      MEDICATIONS:  Antibiotics:    Neuro:  acetaminophen     Tablet .. 650 milliGRAM(s) Oral every 6 hours PRN  oxyCODONE    IR 5 milliGRAM(s) Oral every 6 hours PRN    Anticoagulation:    OTHER:  atorvastatin 20 milliGRAM(s) Oral at bedtime  dextrose 50% Injectable 25 Gram(s) IV Push once  dextrose 50% Injectable 25 Gram(s) IV Push once  dextrose 50% Injectable 12.5 Gram(s) IV Push once  dextrose Oral Gel 15 Gram(s) Oral once PRN  glucagon  Injectable 1 milliGRAM(s) IntraMuscular once  insulin lispro (ADMELOG) corrective regimen sliding scale   SubCutaneous three times a day before meals  insulin lispro (ADMELOG) corrective regimen sliding scale   SubCutaneous at bedtime  losartan 50 milliGRAM(s) Oral daily  metoprolol succinate ER 50 milliGRAM(s) Oral two times a day  NIFEdipine XL 60 milliGRAM(s) Oral daily  pantoprazole    Tablet 40 milliGRAM(s) Oral before breakfast  polyethylene glycol 3350 17 Gram(s) Oral daily  senna 2 Tablet(s) Oral at bedtime    IVF:  dextrose 5%. 1000 milliLiter(s) IV Continuous <Continuous>  dextrose 5%. 1000 milliLiter(s) IV Continuous <Continuous>  multivitamin 1 Tablet(s) Oral daily  sodium chloride 0.9% lock flush 3 milliLiter(s) IV Push every 8 hours      Vital Signs Last 24 Hrs  T(C): 36.7 (28 Oct 2024 15:19), Max: 37.1 (27 Oct 2024 16:37)  T(F): 98.1 (28 Oct 2024 15:19), Max: 98.7 (27 Oct 2024 16:37)  HR: 68 (28 Oct 2024 15:19) (61 - 98)  BP: 138/88 (28 Oct 2024 15:19) (130/68 - 162/88)  BP(mean): --  RR: 19 (28 Oct 2024 15:19) (16 - 19)  SpO2: 92% (28 Oct 2024 15:19) (92% - 96%)    Parameters below as of 28 Oct 2024 07:55  Patient On (Oxygen Delivery Method): room air

## 2024-10-28 NOTE — ED ADULT NURSE REASSESSMENT NOTE - NS ED NURSE REASSESS COMMENT FT1
Assumed care of pt from RN JUNI. Pt was at MRI at shift change. Pt speaks American Creole,  Ipad utilized. Cardiac MD at bedside, assessed pt after. Pt is AOx4, sensory and motor function intact, DARYL. Pt seems neurologically intact, states that he feels better now than he did before, states that when he came in he could not recognize himself. Pt c/o mild h/a 3/10 on pain scale. Pt denies SOB, abd pain, back pain, headaches, dizziness, lightheadedness, fevers, chills, nausea, vomiting, diarrhea, constipation and dysuria. Pt awawiting CT scan. Pt remains on tele monitor w/, bed locked and in lowest position.
Pt AOx4, resting comfortably in bed. Denies pain, SOB, chest pain, or abdominal pain. DARYL, sensory/motor function intact. Glucose was elevated but insulin was held d/t NPO status. Pt remains on tele monitor w/, bed locked and in lowest position.
Pt AOx4, resting comfortably in bed. Denies pain, SOB, chest pain, or abdominal pain. DARYL, sensory/motor function intact. Glucose was elevated but insulin was held d/t NPO status. Pt requesting to eat food and sister has called multiple times bc he is saying he is weak from hunger, PA made aware and NPO to be d/c after final TTR results post. Pt remains on tele monitor w/, bed locked and in lowest position.
Report given to CDU RN. Pt moved to CDU 10L . Pt placed on telebox. Patient awake and alert, respirations even and unlabored, in no apparent distress.  Plan, abnormal labs, history of present illness, pending labs/tests explained, opportunity to answer questions provided.
Respirations even and unlabored, resting in ED stretcher, NAD. CM and  in progress. Wheels locked, bed in lowest position, Pending MRI
Assumed care of pt at this time. A&O x 4 c/o weakness. Pt alert, awake and following directions. Strength WNL. Sensory intact. PERRL. CT pending. Bed locked and in lowest position. Call bell within reach. Frequent checks made.
No

## 2024-10-28 NOTE — ED CDU PROVIDER SUBSEQUENT DAY NOTE - ATTENDING APP SHARED VISIT CONTRIBUTION OF CARE
I, Avtar Gallegos MD, performed the initial face to face bedside interview with this patient regarding history of present illness, review of symptoms and relevant past medical, social and family history.  I completed an independent physical examination.  I was the initial provider who evaluated this patient. I have signed out the follow up of any pending tests (i.e. labs, radiological studies) to the ACP.  I have communicated the patient’s plan of care and disposition with the ACP. Evaluated by cardiology - recommending TTE and potential further ischemic work up. Vital signs remained stable overnight. Received no calls by RN overnight.

## 2024-10-28 NOTE — CONSULT NOTE ADULT - ASSESSMENT
62 y/o M with PMHx HTN, HLD, DM, H. Pylori (treated in [facility] earlier this year, has been here since June) pituitary tumor who presents to [facility] ED with generalized weakness. Patient has been having generalized weakness but today was too weak to get off the floor and came to ED for further evaluation. CT head found with Large pituitary tumor with chiasmatic encroachment. EKG was performed and he was found with TWI in Lead II and V3-V6. Previous EKG in 8/2024 which had TWI in V3-V6. Reports compliance with medications, states he brought his medications from [facility] but has not seen a doctor here and has never seen a cardiologist. He endorses chest pain with climbing stairs. He currently denies chest pain, back pain, headache, dizziness, SOB, [place], diaphoresis, syncope or N/V. Endorses GERD after eating.

## 2024-10-28 NOTE — CONSULT NOTE ADULT - NS ATTEND AMEND GEN_ALL_CORE FT
-    63M hx HTN, HLD, DM, pituitary tumor who presents to [facility] ED with generalized weakness. CT head found with Large pituitary tumor with chiasmatic encroachment. EKG was performed and he was found with TWI in Lead II and V3-V6. Previous EKG in 8/2024 which had TWI in V3-V6. Admits chest pain with climbing stairs. Admits reflux symptoms with GERD after eating.     Abnormal EKG.   - Discussed with neurosurgery STEPHANIE Hartman; planning for surgical intervention this visit, however pending cardiovascular clearance. Surgery is not urgent as patient not having severe symptoms. Pending MRI for further evaluation.  - now found with TWI in II, V3-V6 suggestive of anterolateral ischemia  - admits CP with climbing stairs  - hsTnT 27->22  - Serial CE, EKG, TTE, CCTA planned. -    63M hx HTN, HLD, DM, pituitary tumor who presents to [facility] ED with generalized weakness. CT head found with Large pituitary tumor with chiasmatic encroachment. EKG was performed and he was found with TWI in Lead II and V3-V6. Previous EKG in 8/2024 which had TWI in V3-V6. Admits chest pain with climbing stairs. Admits reflux symptoms with GERD after eating.     Abnormal EKG.   - Discussed with neurosurgery STEPHANIE Hartman; planning for surgical intervention this visit, however pending cardiovascular clearance. Surgery is not urgent as patient not having severe symptoms. Pending MRI for further evaluation.  - now found with TWI in II, V3-V6 suggestive of anterolateral ischemia  - admits CP with climbing stairs  - hsTnT 27->22  - Serial CE, EKG, TTE, CCTA planned.    ADDENDUM 10/28/2024 6:40 pm  Pt is optimized for the planned surgery from cardiac standpoint.  Recc ASA 81 mg daily once cleared by neurosurgery and lipid management as per Primary medical team.  TTE LVEF 71. Mild TR.   CCTA   Calculated Agatston score is 58  Minimal non-obstructive CAD    will s/o pls call with any question

## 2024-10-28 NOTE — H&P ADULT - REASON FOR ADMISSION
Mauro Pituitary Tumor

## 2024-10-28 NOTE — CONSULT NOTE ADULT - PROBLEM SELECTOR RECOMMENDATION 9
.  - presented for generalized weakness  - Known hx of pituitary tumor, on CT found to be large with chiasmatic encroachment with invasion of cavernous and sphenoid sinuses  - Discussed with neurosurgery STEPHANIE Hartman; planning for surgical intervention this visit, however pending cardiovascular clearance. Surgery is not urgent as patient not having severe symptoms. Pending MRI for further evaluation.  - now found with TWI in II, V3-V6 suggestive of anterolateral ischemia  - no prior cardiac evaluation.   - also endorses CP with climbing stairs, otherwise no symptoms  - hsTnT 27->22  - reports compliance with home medications. Toprol XL 50mg PO daily, Nifedipine 60mg PO daily and Losartan 50mg PO daily  - has several risk factors for CAD  - pending TTE for evaluation of cardiac function and any valvular abnormalities  - Keep NPO in anticipation of further ischemic evaluation .  - presented for generalized weakness  - Known hx of pituitary tumor, on CT found to be large with chiasmatic encroachment with invasion of cavernous and sphenoid sinuses  - Discussed with neurosurgery STEPHANIE Hartman; planning for surgical intervention this visit, however pending cardiovascular clearance. Surgery is not urgent as patient not having severe symptoms. Pending MRI for further evaluation.  - now found with TWI in II, V3-V6 suggestive of anterolateral ischemia  - no prior cardiac evaluation.   - also endorses CP with climbing stairs, otherwise no symptoms  - hsTnT 27->22  - reports compliance with home medications. Toprol XL 50mg PO daily, Nifedipine 60mg PO daily and Losartan 50mg PO daily  - has several risk factors for CAD  - pending TTE for evaluation of cardiac function and any valvular abnormalities  - Keep NPO in anticipation of further ischemic evaluation with CCTA

## 2024-10-28 NOTE — CHART NOTE - NSCHARTNOTEFT_GEN_A_CORE
endocrinology consulted for pituitary adenoma  chart reviewed, ordered further pituitary hormonal testing: prolactin by dilution, free T4 and am cortisol level  full consult to follow

## 2024-10-28 NOTE — PATIENT PROFILE ADULT - FALL HARM RISK - RISK INTERVENTIONS

## 2024-10-28 NOTE — ED CDU PROVIDER SUBSEQUENT DAY NOTE - HISTORY
Evaluated by cardiology - recommending TTE and potential further ischemic work up. Vital signs remained stable overnight. Received no calls by RN overnight.

## 2024-10-28 NOTE — H&P ADULT - ASSESSMENT
63M with PMH of DM, HTN, HLD, GERD, on ASA81 daily, known history of pituitary tumor diagnosed 8 years ago in [place] with no follow up since then, presents with generalized weakness x 1 day. Neurosurgery admitting for 2.7 x 2.7 x 3.9 cm pituitary mass with chiasmatic encroachment.    Plan:  - Q4 neuro checks  - All imaging reviewed: CTH and MRI Brain shows 2.7 x 2.7 x 3.9 cm pituitary mass with chiasmatic encroachment  - Endocrine consult pending  - Endocrine labs (GH, Prolactin, Thyroid, ACTH, 8AM Cortisol) and BMP  - Cardiology consult, w/u, recs and clearance appreciated  - SBP goal <160  - Resume home HTN/HLD meds  - Medicine consult and clearance pending  - Hold steroids until 8AM Cortisol   - Hold ASA  - Bowel Regimen: Senna and Miralax  - DVT ppx: SCDs only  - Discussed with Dr. Harmon  
Parent

## 2024-10-28 NOTE — CONSULT NOTE ADULT - NS_MD_PANP_GEN_ALL_CORE
no
Attending and PA/NP shared services statement (NON-critical care):
Attending and PA/NP shared services statement (NON-critical care):
DISPLAY PLAN FREE TEXT

## 2024-10-28 NOTE — ED CDU PROVIDER SUBSEQUENT DAY NOTE - CLINICAL SUMMARY MEDICAL DECISION MAKING FREE TEXT BOX
64 yo male PMHx HTN, HLD, NIDDM2, GERD, known brain tumor placed in OBS after initially presenting to the ED because of generalized weakness. Lab work significant for hypomagnesemia, given 2g; new EKG abnormalities. Cardiology consulted - pending TTE and potential further ischemic eval. Additionally on CT patient with large pituitary tumor. Neurosurgery consulted - recommending MRI and endocrine evaluation.

## 2024-10-28 NOTE — H&P ADULT - NSHPLABSRESULTS_GEN_ALL_CORE
LABS:                        11.9   6.17  )-----------( 172      ( 27 Oct 2024 13:59 )             35.6     10-28    138  |  100  |  11.2  ----------------------------<  162[H]  3.3[L]   |  25.0  |  0.77    Ca    8.3[L]      28 Oct 2024 06:30  Mg     1.2     10-27    TPro  7.9  /  Alb  4.3  /  TBili  0.8  /  DBili  x   /  AST  41[H]  /  ALT  14  /  AlkPhos  71  10-27    PT/INR - ( 27 Oct 2024 16:49 )   PT: 13.7 sec;   INR: 1.18 ratio         PTT - ( 27 Oct 2024 16:49 )  PTT:31.1 sec  Urinalysis Basic - ( 28 Oct 2024 06:30 )    Color: x / Appearance: x / SG: x / pH: x  Gluc: 162 mg/dL / Ketone: x  / Bili: x / Urobili: x   Blood: x / Protein: x / Nitrite: x   Leuk Esterase: x / RBC: x / WBC x   Sq Epi: x / Non Sq Epi: x / Bacteria: x      RADIOLOGY & ADDITIONAL STUDIES:    < from: MR Sella alone w/wo IV Cont (10.28.24 @ 07:26) >  IMPRESSION:    1. 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    2. BRAIN:   Ischemic white matter disease greater than typical for age.   Diffuse brain volume loss typical for age.    < from: CT Angio Cardiac w/ IV Cont (10.28.24 @ 09:06) >  Impression:    Cardiac:  1. There is minimal non-obstructive coronary artery disease (CAD) of the   mid LAD, mid LCx and mid RCA consistent with CAD-RADS 1. Co-dominant   coronary circulation (PDA originates from RCA and PLV originates from   LCx).  2. Observed calcium score 58 is at percentile 52  for individuals of same   age, gender, and race/ethnicity who are free of clinical cardiovascular   disease and treated diabetes mellitus ([place] NIH database).  3. Aortic valve with trivial calcification.  4. Mild sinotubular junction calcification.

## 2024-10-28 NOTE — CONSULT NOTE ADULT - ASSESSMENT
63M with PMH of DM, HTN, HLD, GERD, on ASA81 daily, known history of pituitary tumor diagnosed 8 years ago in [facility] with no follow up since then, presents with generalized weakness x 1 day and found to have large pituitary mass extensively   elevates and distorts the optic chiasm and forebrain, invades the sphenoid sinus to the right of midline and likely invades the right cavernous sinus.    1. Pituitary macroadenoma with optic chiasm compression -hormonal evaluation in progress  - prolactin normal, will check prolactin by dilution given large pituitary tumor  - low GH levels, ideally better to check IGF-1 level  - TSH normal, but unreliable in setting of pituitary disease --> check Free T4 level  - normal ACTH, but am cortisol needed to exclude central AI  - KAREN following, possible resection     2. T2DM (outpt meds: Metformin) FS <180's today  - check HbA1c  - cont admelog scale, cont to monitor glucoses, will consider adding lantus insulin  - diabetic diet while in hospital    3. HLD - cont statin 63M with PMH of DM, HTN, HLD, GERD, on ASA81 daily, known history of pituitary tumor diagnosed 8 years ago in [facility] with no follow up since then, presents with generalized weakness x 1 day and found to have large pituitary mass extensively   elevates and distorts the optic chiasm and forebrain, invades the sphenoid sinus to the right of midline and likely invades the right cavernous sinus.    1. Pituitary macroadenoma with optic chiasm compression -hormonal evaluation in progress  - prolactin normal, will check prolactin by dilution given large pituitary tumor  - low GH levels, ideally better to check IGF-1 level  - TSH normal, but unreliable in setting of pituitary disease --> check Free T4 level  - normal ACTH, but am cortisol needed to exclude central AI  - KAREN following, possible resection     2. T2DM (outpt meds: Metformin) FS <180's today  - check HbA1c  - cont admelog scale, cont to monitor glucoses, will consider adding lantus insulin  - diabetic diet while in hospital    3. HLD - cont statin  4. abnormal EKG in setting of DM, HTN and HLD   - cardiology following, TTE pending

## 2024-10-28 NOTE — ED CDU PROVIDER DISPOSITION NOTE - ATTENDING CONTRIBUTION TO CARE
Present for patient care. Present for face to face patient care. I have seen the patient with the ROSELIA and agree with above examination and assessment and plan with the following addendum:    Admitted to neurosurgery for further care.    Focused PE:   General: NAD, alert and oriented  Head: Normocephalic, atraumatic  Eyes: PERRLA, EOMI  Cardiac: RRR, no murmurs, rubs or gallops  Resp: CTA, no wheezes, rales or ronchi  GI: Nondistended, nontender, no rebound or guarding  MSK: FROM, no tenderness.   Neuro: Alert and oriented, no focal deficits.   Ext: Non edematous, nontender.

## 2024-10-28 NOTE — H&P ADULT - NSHPPHYSICALEXAM_GEN_ALL_CORE
PHYSICAL EXAM:  GENERAL: NAD, well-groomed  HEAD:  Atraumatic, normocephalic  AMARIS COMA SCORE: E- V- M- = 15  MENTAL STATUS: AAO x3; Awake; Opens eyes spontaneously; Appropriately conversant without aphasia; following simple commands  CRANIAL NERVES: PERRL. EOMI without nystagmus. Face symmetric w/ normal eye closure and smile, tongue midline. Hearing grossly intact. Speech clear. Head turning and shoulder shrug intact.   MOTOR: strength 5/5 b/l upper and lower extremities  LUNGS: breathing comfortably on RA, equal chest rise  EXTREMITIES: no clubbing, cyanosis, or edema  SKIN: Warm, dry

## 2024-10-28 NOTE — CONSULT NOTE ADULT - SUBJECTIVE AND OBJECTIVE BOX
[facility] [place] [facility]                                              CARDIOLOGY AT [place] [address]                                             Telephone: [phone]. Fax:[phone]                                                       CARDIOLOGY CONSULTATION NOTE                                                                                             History obtained by: Patient and medical record  Community Cardiologist: none   obtained: Yes [x  ] No [  ] Aman Mock #[id number]  Reason for Consultation: abnormal EKG  Available out pt records reviewed: Yes [ x ] No [  ]    Chief complaint:    Patient is a 63y old  Male who presents with a chief complaint of Generalized weakness x 1 day (27 Oct 2024 20:05)      HPI:  64 y/o M with PMHx HTN, HLD, DM, H. Pylori (treated in [facility] earlier this year) pituitary tumor who presents to [facility] ED with generalized weakness. Patient has been having generalized weakness but today was too weak to get off the floor and came to ED for further evaluation. CT head found with Large pituitary tumor with chiasmatic encroachment. EKG was performed and he was found with TWI in Lead II and V3-V6. Previous EKG in 2024 which had TWI in V3-V6. Reports compliance with medications, states he brought his medications from [facility] but has not seen a doctor here and has never seen a cardiologist. He endorses chest pain with climbing stairs. He currently denies chest pain, back pain, headache, dizziness, SOB, [place], diaphoresis, syncope or N/V. Endorses GERD after eating.       CARDIAC TESTING   ECHO:    STRESS:    CATH:     ELECTROPHYSIOLOGY:     PAST MEDICAL HISTORY  HTN (hypertension)  HLD (hyperlipidemia)  DM (diabetes mellitus)  GERD (gastroesophageal reflux disease)  H/O: pituitary tumor    PAST SURGICAL HISTORY  No significant past surgical history    SOCIAL HISTORY:  Denies smoking/alcohol/drugs  CIGARETTES:     ALCOHOL:  DRUGS:    FAMILY HISTORY:  No pertinent family history in first degree relatives      Family History of Cardiovascular Disease:  Yes [  ] No [x  ]  Coronary Artery Disease in first degree relative: Yes [  ] No [  x]  Sudden Cardiac Death in First degree relative: Yes [  ] No [x  ]    HOME MEDICATIONS:  atorvastatin 20 mg oral tablet: 1 tab(s) orally once a day (at bedtime) (27 Oct 2024 23:49)  losartan 50 mg oral tablet: 1 tab(s) orally once a day (27 Oct 2024 23:49)  metFORMIN 500 mg oral tablet: 1 tab(s) orally 2 times a day (27 Oct 2024 23:49)  metoprolol succinate 50 mg oral tablet, extended release: 1 tab(s) orally 2 times a day (27 Oct 2024 23:49)  NIFEdipine (Eqv-Adalat CC) 60 mg oral tablet, extended release: 1 tab(s) orally once a day (27 Oct 2024 23:49)      CURRENT CARDIAC MEDICATIONS:  losartan 50 milliGRAM(s) Oral daily  metoprolol succinate ER 50 milliGRAM(s) Oral two times a day  NIFEdipine XL 60 milliGRAM(s) Oral daily      CURRENT OTHER MEDICATIONS:  acetaminophen     Tablet .. 650 milliGRAM(s) Oral every 6 hours PRN Moderate Pain (4 - 6)  pantoprazole    Tablet 40 milliGRAM(s) Oral before breakfast  atorvastatin 20 milliGRAM(s) Oral at bedtime  dextrose 5%. 1000 milliLiter(s) (100 mL/Hr) IV Continuous <Continuous>  dextrose 5%. 1000 milliLiter(s) (50 mL/Hr) IV Continuous <Continuous>  dextrose 50% Injectable 12.5 Gram(s) IV Push once, Stop order after: 1 Doses  dextrose 50% Injectable 25 Gram(s) IV Push once, Stop order after: 1 Doses  dextrose 50% Injectable 25 Gram(s) IV Push once, Stop order after: 1 Doses  dextrose Oral Gel 15 Gram(s) Oral once, Stop order after: 1 Doses PRN Blood Glucose LESS THAN 70 milliGRAM(s)/deciliter  glucagon  Injectable 1 milliGRAM(s) IntraMuscular once, Stop order after: 1 Doses  insulin lispro (ADMELOG) corrective regimen sliding scale   SubCutaneous three times a day before meals  insulin lispro (ADMELOG) corrective regimen sliding scale   SubCutaneous at bedtime      ALLERGIES:   No Known Allergies      REVIEW OF SYMPTOMS:   CONSTITUTIONAL: No fever, no chills, no weight loss, no weight gain, no fatigue   ENMT:  No vertigo; No sinus or throat pain  NECK: No pain or stiffness  CARDIOVASCULAR: No chest pain, no dyspnea, no syncope/presyncope, no palpitations, no dizziness, no Orthopnea, no Paroxsymal nocturnal dyspnea  RESPIRATORY: no Shortness of breath, no cough, no wheezing  : No dysuria, no hematuria   GI: No dark color stool, no nausea, no diarrhea, no constipation, no abdominal pain   NEURO: No headache, no slurred speech   MUSCULOSKELETAL: No joint pain or swelling; No muscle, back, or extremity pain  PSYCH: No agitation, no anxiety.    ALL OTHER REVIEW OF SYSTEMS ARE NEGATIVE.    VITAL SIGNS:  T(C): 37.1 (10-27-24 @ 23:49), Max: 37.2 (10-27-24 @ 12:17)  T(F): 98.7 (10-27-24 @ 23:49), Max: 98.9 (10-27-24 @ 12:17)  HR: 82 (10-28-24 @ 01:06) (82 - 99)  BP: 156/97 (10-28-24 @ 01:06) (130/68 - 158/84)  RR: 18 (10-27-24 @ 23:49) (16 - 18)  SpO2: 96% (10-27-24 @ 23:49) (94% - 96%)    INTAKE AND OUTPUT:       PHYSICAL EXAM:  Constitutional: Comfortable . No acute distress.   HEENT: Atraumatic and normocephalic , neck is supple . no JVD. No carotid bruit.  CNS: A&Ox3. No focal deficits.   Respiratory: CTAB, unlabored   Cardiovascular: RRR normal s1 s2. No murmur. No rubs or gallop.  Gastrointestinal: Soft, non-tender. +Bowel sounds.   Extremities: 2+ Peripheral Pulses, No clubbing, cyanosis, or edema  Psychiatric: Calm . no agitation.   Skin: Warm and dry, no ulcers on extremities     LABS:                            11.9   6.17  )-----------( 172      ( 27 Oct 2024 13:59 )             35.6     10-27    132[L]  |  93[L]  |  11.6  ----------------------------<  168[H]  4.4   |  21.0[L]  |  1.08    Ca    8.9      27 Oct 2024 13:59  Mg     1.2     10-27    TPro  7.9  /  Alb  4.3  /  TBili  0.8  /  DBili  x   /  AST  41[H]  /  ALT  14  /  AlkPhos  71  10-27    PT/INR - ( 27 Oct 2024 16:49 )   PT: 13.7 sec;   INR: 1.18 ratio         PTT - ( 27 Oct 2024 16:49 )  PTT:31.1 sec  Urinalysis Basic - ( 27 Oct 2024 16:49 )    Color: Yellow / Appearance: Clear / S.016 / pH: x  Gluc: x / Ketone: 15 mg/dL  / Bili: Negative / Urobili: 1.0 mg/dL   Blood: x / Protein: Trace mg/dL / Nitrite: Negative   Leuk Esterase: Negative / RBC: x / WBC x   Sq Epi: x / Non Sq Epi: x / Bacteria: x          Thyroid Stimulating Hormone, Serum: 0.38 uIU/mL (10-28-24 @ 00:47)      INTERPRETATION OF TELEMETRY:     ECG: NSR with TWI in II, V3-V6  Prior ECG: Yes [x  ] No [  ]    RADIOLOGY & ADDITIONAL STUDIES:    X-ray:    CT scan:   < from: CT Angio Neck w/ IV Cont (10.27.24 @ 17:02) >  IMPRESSION:    1.  Large pituitary tumor with chiasmatic encroachment, consider   pituitary MRI.  2.  Invasion of the cavernous sinuses and sphenoid sinuses.  3.  Incidental vascular lesion dorsal to the right thyroid.    < end of copied text >  MRI:   US:

## 2024-10-28 NOTE — CONSULT NOTE ADULT - SUBJECTIVE AND OBJECTIVE BOX
HPI: Finnish Creole speaking, Hx via chart and interpretor [id number]    HPI: 63M with PMH of DM, HTN, HLD, GERD, on ASA81 daily, known history of pituitary tumor diagnosed 8 years ago in [facility] with no follow up since then, presents with generalized weakness x 1 day.  Found to have 2.7 x 2.7 x 3.9 cm pituitary mass extensively elevates and distorts the optic chiasm and forebrain, invades the   sphenoid sinus to the right of midline and likely invades the right cavernous sinus. Pt also noted to have TWI on EKG, cards consulted and further w/u pending. Pt reportedly claims to be compliant on home cardiac medications. Pt admitted to neurosurgery for likely surgical resection of pituitary tumor with Dr. Harmon.    REVIEW OF SYSTEMS:  CONSTITUTIONAL:  (+) weakness,No fevers or chills  HEAD: (+) frontal headache  EYES:  No visual changes;  (+) blurry vision  NECK:  No anterior neck pain, dysphagia or voice changes  RESPIRATORY:  No cough. No wheezing. No shortness of breath  CARDIOVASCULAR:  (+) anterior chest wall pain. No palpitations  GASTROINTESTINAL:  No abdominal or epigastric pain. No nausea or vomiting; (+) constipation.   GENITOURINARY:  (+) dysuria, (+) urinary frequency  NEUROLOGICAL:  No numbness in extremities  ENDO: No excessive thirst. No nocturia. No dizziness    PAST MEDICAL & SURGICAL HISTORY:  HTN (hypertension)  HLD (hyperlipidemia)  DM (diabetes mellitus)  GERD (gastroesophageal reflux disease)  H/O: pituitary tumor  No significant past surgical history      FAMILY HISTORY:  No pertinent family history in first degree relatives    SOCIAL HISTORY: denies etoh/tobacco/illicit drugs    AllergiesNo Known Drug Allergies    HOME MEDICATIONS:  Home Medications:  atorvastatin 20 mg oral tablet: 1 tab(s) orally once a day (at bedtime) (28 Oct 2024 16:14)  losartan 50 mg oral tablet: 1 tab(s) orally once a day (28 Oct 2024 16:14)  metFORMIN 500 mg oral tablet: 1 tab(s) orally 2 times a day (28 Oct 2024 16:14)  metoprolol succinate 50 mg oral tablet, extended release: 1 tab(s) orally 2 times a day (28 Oct 2024 16:14)  NIFEdipine (Eqv-Adalat CC) 60 mg oral tablet, extended release: 1 tab(s) orally once a day (28 Oct 2024 16:14)    MEDICATIONS  (STANDING):  atorvastatin 20 milliGRAM(s) Oral at bedtime  dextrose 5%. 1000 milliLiter(s) (100 mL/Hr) IV Continuous <Continuous>  dextrose 5%. 1000 milliLiter(s) (50 mL/Hr) IV Continuous <Continuous>  dextrose 50% Injectable 12.5 Gram(s) IV Push once  dextrose 50% Injectable 25 Gram(s) IV Push once  dextrose 50% Injectable 25 Gram(s) IV Push once  glucagon  Injectable 1 milliGRAM(s) IntraMuscular once  insulin lispro (ADMELOG) corrective regimen sliding scale   SubCutaneous three times a day before meals  insulin lispro (ADMELOG) corrective regimen sliding scale   SubCutaneous at bedtime  losartan 50 milliGRAM(s) Oral daily  metoprolol succinate ER 50 milliGRAM(s) Oral two times a day  multivitamin 1 Tablet(s) Oral daily  NIFEdipine XL 60 milliGRAM(s) Oral daily  pantoprazole    Tablet 40 milliGRAM(s) Oral before breakfast  polyethylene glycol 3350 17 Gram(s) Oral daily  senna 2 Tablet(s) Oral at bedtime  sodium chloride 0.9% lock flush 3 milliLiter(s) IV Push every 8 hours    MEDICATIONS  (PRN):  acetaminophen     Tablet .. 650 milliGRAM(s) Oral every 6 hours PRN Moderate Pain (4 - 6)  dextrose Oral Gel 15 Gram(s) Oral once PRN Blood Glucose LESS THAN 70 milliGRAM(s)/deciliter  oxyCODONE    IR 5 milliGRAM(s) Oral every 6 hours PRN Severe Pain (7 - 10)    ALLERGIES: No Known Allergies    Vital Signs Last 24 Hrs  T(C): 36.7 (28 Oct 2024 15:19), Max: 37.1 (27 Oct 2024 23:49)  T(F): 98.1 (28 Oct 2024 15:19), Max: 98.7 (27 Oct 2024 23:49)  HR: 68 (28 Oct 2024 15:19) (61 - 91)  BP: 138/88 (28 Oct 2024 15:19) (138/88 - 162/88)  BP(mean): --  RR: 19 (28 Oct 2024 15:19) (18 - 19)  SpO2: 92% (28 Oct 2024 15:19) (92% - 96%)    Parameters below as of 28 Oct 2024 07:55  Patient On (Oxygen Delivery Method): room air    Physical Exam:  General appearance: Well developed, well nourished.  Eyes: Pupils equal. EOMI, visual fields not intact on gross examination (pt also had difficult time understanding how to perform test)   Neck: Trachea midline. No thyroid enlargement. No palpable thyroid nodules  Lungs: Normal respiratory excursion. Lungs clear no w/r/r  CV: Normal S1S2, regular. No m/r/g.   Abdomen: Soft, nontender, nondistended, (+) BS  Musculoskeletal: No cyanosis, clubbing, or edema.  Skin: Warm and moist.   Neuro: Cranial nerves intact.   Psych: Normal affect, good judgement/insight      LABS:                        11.9   6.17  )-----------( 172      ( 27 Oct 2024 13:59 )             35.6     10-28    138  |  100  |  11.2  ----------------------------<  162[H]  3.3[L]   |  25.0  |  0.77    Ca    8.3[L]      28 Oct 2024 06:30  Mg     1.2     10-27    TPro  7.9  /  Alb  4.3  /  TBili  0.8  /  DBili  x   /  AST  41[H]  /  ALT  14  /  AlkPhos  71  10-27    LIVER FUNCTIONS - ( 27 Oct 2024 13:59 )  Alb: 4.3 g/dL / Pro: 7.9 g/dL / ALK PHOS: 71 U/L / ALT: 14 U/L / AST: 41 U/L / GGT: x           CAPILLARY BLOOD GLUCOSE  POCT Blood Glucose.: 175 mg/dL (28 Oct 2024 13:27)  POCT Blood Glucose.: 168 mg/dL (28 Oct 2024 09:26)    Thyroid Stimulating Hormone, Serum: 0.38 uIU/mL [0.27 - 4.20] (10-28-24)    < from: MR Sella alone w/wo IV Cont (10.28.24 @ 07:26) >  Large pituitary tumor expands and distorts the sella. This lesion extends   upward obliterating the suprasellar cistern and considerably elevating   the optic chiasm. The anterior cerebral arteries are displaced right to   left, anterior to posterior and inferior to superior, without apparent   narrowing of the lesion extends within the right cavernous sinus lateral   to the apex of the internal carotid artery. At the left cavernous sinus   tumor remains medial to the internal carotid artery. The lesion extends   downward remodeling the sella or for an extensively invading the right   sphenoid sinus. Within the further lateral pterygoid recesses and   posterior superior and aspect of the sphenoid sinus T1 hyperintense   material suggests trapped secretions. Tumor appears to perforate the   sphenoid septum with right to left extension of tumor into the left   sphenoid sinus.The tumor is heterogeneous on the long TR images with   hyperintensity near its vertex but differentially greater enhancement,   suggesting cystic and vascular tumor. Overall the lesion measures   approximately 4.8 cm in height x 3.3 cm AP x 8 2.9 cmtransverse in   maximum dimensions. The elevated ventral forebrain is distorted and   demonstrates no signal intensity change or evidence of invasion.    < end of copied text >                   HPI: [place] Creole speaking, Hx via chart and interpretor [id number]    HPI: 63M with PMH of DM, HTN, HLD, GERD, on ASA81 daily, known history of pituitary tumor diagnosed 8 years ago in [facility] with no follow up since then, presents with generalized weakness x 1 day.  Found to have 2.7 x 2.7 x 3.9 cm pituitary mass extensively elevates and distorts the optic chiasm and forebrain, invades the sphenoid sinus to the right of midline and likely invades the right cavernous sinus. Pt also noted to have TWI on EKG, cards consulted and further w/u pending. Pt reportedly claims to be compliant on home cardiac medications. Pt admitted to neurosurgery for likely surgical resection of pituitary tumor with Dr. Harmon.    REVIEW OF SYSTEMS:  CONSTITUTIONAL:  (+) weakness,No fevers or chills  HEAD: (+) frontal headache  EYES:  No visual changes;  (+) blurry vision  NECK:  No anterior neck pain, dysphagia or voice changes  RESPIRATORY:  No cough. No wheezing. No shortness of breath  CARDIOVASCULAR:  (+) anterior chest wall pain. No palpitations  GASTROINTESTINAL:  No abdominal or epigastric pain. No nausea or vomiting; (+) constipation.   GENITOURINARY:  (+) dysuria, (+) urinary frequency  NEUROLOGICAL:  No numbness in extremities  ENDO: No excessive thirst. No nocturia. No dizziness    PAST MEDICAL & SURGICAL HISTORY:  HTN (hypertension)  HLD (hyperlipidemia)  DM (diabetes mellitus)  GERD (gastroesophageal reflux disease)  H/O: pituitary tumor  No significant past surgical history      FAMILY HISTORY:  No pertinent family history in first degree relatives    SOCIAL HISTORY: denies etoh/tobacco/illicit drugs    AllergiesNo Known Drug Allergies    HOME MEDICATIONS:  Home Medications:  atorvastatin 20 mg oral tablet: 1 tab(s) orally once a day (at bedtime) (28 Oct 2024 16:14)  losartan 50 mg oral tablet: 1 tab(s) orally once a day (28 Oct 2024 16:14)  metFORMIN 500 mg oral tablet: 1 tab(s) orally 2 times a day (28 Oct 2024 16:14)  metoprolol succinate 50 mg oral tablet, extended release: 1 tab(s) orally 2 times a day (28 Oct 2024 16:14)  NIFEdipine (Eqv-Adalat CC) 60 mg oral tablet, extended release: 1 tab(s) orally once a day (28 Oct 2024 16:14)    MEDICATIONS  (STANDING):  atorvastatin 20 milliGRAM(s) Oral at bedtime  dextrose 5%. 1000 milliLiter(s) (100 mL/Hr) IV Continuous <Continuous>  dextrose 5%. 1000 milliLiter(s) (50 mL/Hr) IV Continuous <Continuous>  dextrose 50% Injectable 12.5 Gram(s) IV Push once  dextrose 50% Injectable 25 Gram(s) IV Push once  dextrose 50% Injectable 25 Gram(s) IV Push once  glucagon  Injectable 1 milliGRAM(s) IntraMuscular once  insulin lispro (ADMELOG) corrective regimen sliding scale   SubCutaneous three times a day before meals  insulin lispro (ADMELOG) corrective regimen sliding scale   SubCutaneous at bedtime  losartan 50 milliGRAM(s) Oral daily  metoprolol succinate ER 50 milliGRAM(s) Oral two times a day  multivitamin 1 Tablet(s) Oral daily  NIFEdipine XL 60 milliGRAM(s) Oral daily  pantoprazole    Tablet 40 milliGRAM(s) Oral before breakfast  polyethylene glycol 3350 17 Gram(s) Oral daily  senna 2 Tablet(s) Oral at bedtime  sodium chloride 0.9% lock flush 3 milliLiter(s) IV Push every 8 hours    MEDICATIONS  (PRN):  acetaminophen     Tablet .. 650 milliGRAM(s) Oral every 6 hours PRN Moderate Pain (4 - 6)  dextrose Oral Gel 15 Gram(s) Oral once PRN Blood Glucose LESS THAN 70 milliGRAM(s)/deciliter  oxyCODONE    IR 5 milliGRAM(s) Oral every 6 hours PRN Severe Pain (7 - 10)    ALLERGIES: No Known Allergies    Vital Signs Last 24 Hrs  T(C): 36.7 (28 Oct 2024 15:19), Max: 37.1 (27 Oct 2024 23:49)  T(F): 98.1 (28 Oct 2024 15:19), Max: 98.7 (27 Oct 2024 23:49)  HR: 68 (28 Oct 2024 15:19) (61 - 91)  BP: 138/88 (28 Oct 2024 15:19) (138/88 - 162/88)  BP(mean): --  RR: 19 (28 Oct 2024 15:19) (18 - 19)  SpO2: 92% (28 Oct 2024 15:19) (92% - 96%)    Parameters below as of 28 Oct 2024 07:55  Patient On (Oxygen Delivery Method): room air    Physical Exam:  General appearance: Well developed, well nourished.  Eyes: Pupils equal. EOMI, visual fields not intact on gross examination (pt also had difficult time understanding how to perform test)   Neck: Trachea midline. No thyroid enlargement. No palpable thyroid nodules  Lungs: Normal respiratory excursion. Lungs clear no w/r/r  CV: Normal S1S2, regular. No m/r/g.   Abdomen: Soft, nontender, nondistended, (+) BS  Musculoskeletal: No cyanosis, clubbing, or edema.  Skin: Warm and moist.   Neuro: Cranial nerves intact.   Psych: Normal affect, good judgement/insight      LABS:                        11.9   6.17  )-----------( 172      ( 27 Oct 2024 13:59 )             35.6     10-28    138  |  100  |  11.2  ----------------------------<  162[H]  3.3[L]   |  25.0  |  0.77    Ca    8.3[L]      28 Oct 2024 06:30  Mg     1.2     10-27    TPro  7.9  /  Alb  4.3  /  TBili  0.8  /  DBili  x   /  AST  41[H]  /  ALT  14  /  AlkPhos  71  10-27    LIVER FUNCTIONS - ( 27 Oct 2024 13:59 )  Alb: 4.3 g/dL / Pro: 7.9 g/dL / ALK PHOS: 71 U/L / ALT: 14 U/L / AST: 41 U/L / GGT: x           CAPILLARY BLOOD GLUCOSE  POCT Blood Glucose.: 175 mg/dL (28 Oct 2024 13:27)  POCT Blood Glucose.: 168 mg/dL (28 Oct 2024 09:26)    Thyroid Stimulating Hormone, Serum: 0.38 uIU/mL [0.27 - 4.20] (10-28-24)    < from: MR Sella alone w/wo IV Cont (10.28.24 @ 07:26) >  Large pituitary tumor expands and distorts the sella. This lesion extends   upward obliterating the suprasellar cistern and considerably elevating   the optic chiasm. The anterior cerebral arteries are displaced right to   left, anterior to posterior and inferior to superior, without apparent   narrowing of the lesion extends within the right cavernous sinus lateral   to the apex of the internal carotid artery. At the left cavernous sinus   tumor remains medial to the internal carotid artery. The lesion extends   downward remodeling the sella or for an extensively invading the right   sphenoid sinus. Within the further lateral pterygoid recesses and   posterior superior and aspect of the sphenoid sinus T1 hyperintense   material suggests trapped secretions. Tumor appears to perforate the   sphenoid septum with right to left extension of tumor into the left   sphenoid sinus.The tumor is heterogeneous on the long TR images with   hyperintensity near its vertex but differentially greater enhancement,   suggesting cystic and vascular tumor. Overall the lesion measures   approximately 4.8 cm in height x 3.3 cm AP x 8 2.9 cmtransverse in   maximum dimensions. The elevated ventral forebrain is distorted and   demonstrates no signal intensity change or evidence of invasion.    < end of copied text >

## 2024-10-28 NOTE — H&P ADULT - PATIENT'S GENDER IDENTITY
Male Pt arrives ambulatory to triage c/o vaginal spotting and RLQ pain since Friday. Pt had a positive pregnancy test on 10/8/13. LMP in August. . Used 1 menstrual pad today. RR even and unlabored. Denies PMHx.

## 2024-10-29 LAB
A1C WITH ESTIMATED AVERAGE GLUCOSE RESULT: 7.1 % — HIGH (ref 4–5.6)
ALBUMIN SERPL ELPH-MCNC: 3.8 G/DL — SIGNIFICANT CHANGE UP (ref 3.3–5.2)
ALP SERPL-CCNC: 62 U/L — SIGNIFICANT CHANGE UP (ref 40–120)
ALT FLD-CCNC: 14 U/L — SIGNIFICANT CHANGE UP
ANION GAP SERPL CALC-SCNC: 12 MMOL/L — SIGNIFICANT CHANGE UP (ref 5–17)
AST SERPL-CCNC: 35 U/L — SIGNIFICANT CHANGE UP
BILIRUB SERPL-MCNC: 0.9 MG/DL — SIGNIFICANT CHANGE UP (ref 0.4–2)
BUN SERPL-MCNC: 11.1 MG/DL — SIGNIFICANT CHANGE UP (ref 8–20)
C TRACH RRNA SPEC QL NAA+PROBE: SIGNIFICANT CHANGE UP
CALCIUM SERPL-MCNC: 8.7 MG/DL — SIGNIFICANT CHANGE UP (ref 8.4–10.5)
CHLORIDE SERPL-SCNC: 98 MMOL/L — SIGNIFICANT CHANGE UP (ref 96–108)
CHOLEST SERPL-MCNC: 150 MG/DL — SIGNIFICANT CHANGE UP
CO2 SERPL-SCNC: 26 MMOL/L — SIGNIFICANT CHANGE UP (ref 22–29)
CORTIS AM PEAK SERPL-MCNC: 11.6 UG/DL — SIGNIFICANT CHANGE UP (ref 6–18.4)
CORTIS PRE/P CHAL SERPL-SCNC: SIGNIFICANT CHANGE UP
CREAT SERPL-MCNC: 0.8 MG/DL — SIGNIFICANT CHANGE UP (ref 0.5–1.3)
EGFR: 99 ML/MIN/1.73M2 — SIGNIFICANT CHANGE UP
ESTIMATED AVERAGE GLUCOSE: 157 MG/DL — HIGH (ref 68–114)
GLUCOSE BLDC GLUCOMTR-MCNC: 167 MG/DL — HIGH (ref 70–99)
GLUCOSE BLDC GLUCOMTR-MCNC: 174 MG/DL — HIGH (ref 70–99)
GLUCOSE BLDC GLUCOMTR-MCNC: 199 MG/DL — HIGH (ref 70–99)
GLUCOSE BLDC GLUCOMTR-MCNC: 202 MG/DL — HIGH (ref 70–99)
GLUCOSE SERPL-MCNC: 149 MG/DL — HIGH (ref 70–99)
HCT VFR BLD CALC: 34.7 % — LOW (ref 39–50)
HDLC SERPL-MCNC: 50 MG/DL — SIGNIFICANT CHANGE UP
HGB BLD-MCNC: 11.8 G/DL — LOW (ref 13–17)
INSULIN-LIKE GROWTH FACTOR 1 INTERPRETATION: SIGNIFICANT CHANGE UP
INSULIN-LIKE GROWTH FACTOR 1: 51 NG/ML — SIGNIFICANT CHANGE UP (ref 46–219)
LIPID PNL WITH DIRECT LDL SERPL: 81 MG/DL — SIGNIFICANT CHANGE UP
MAGNESIUM SERPL-MCNC: 1.9 MG/DL — SIGNIFICANT CHANGE UP (ref 1.6–2.6)
MCHC RBC-ENTMCNC: 28.2 PG — SIGNIFICANT CHANGE UP (ref 27–34)
MCHC RBC-ENTMCNC: 34 GM/DL — SIGNIFICANT CHANGE UP (ref 32–36)
MCV RBC AUTO: 82.8 FL — SIGNIFICANT CHANGE UP (ref 80–100)
N GONORRHOEA RRNA SPEC QL NAA+PROBE: SIGNIFICANT CHANGE UP
NON HDL CHOLESTEROL: 100 MG/DL — SIGNIFICANT CHANGE UP
PHOSPHATE SERPL-MCNC: 3.6 MG/DL — SIGNIFICANT CHANGE UP (ref 2.4–4.7)
PLATELET # BLD AUTO: 158 K/UL — SIGNIFICANT CHANGE UP (ref 150–400)
POTASSIUM SERPL-MCNC: 4 MMOL/L — SIGNIFICANT CHANGE UP (ref 3.5–5.3)
POTASSIUM SERPL-SCNC: 4 MMOL/L — SIGNIFICANT CHANGE UP (ref 3.5–5.3)
PROLACTIN SERPL-MCNC: 4.2 NG/ML — SIGNIFICANT CHANGE UP (ref 4.1–18.4)
PROLACTIN SERPL-MCNC: 6.1 NG/ML — SIGNIFICANT CHANGE UP (ref 4.1–18.4)
PROT SERPL-MCNC: 6.9 G/DL — SIGNIFICANT CHANGE UP (ref 6.6–8.7)
RBC # BLD: 4.19 M/UL — LOW (ref 4.2–5.8)
RBC # FLD: 11.7 % — SIGNIFICANT CHANGE UP (ref 10.3–14.5)
SODIUM SERPL-SCNC: 136 MMOL/L — SIGNIFICANT CHANGE UP (ref 135–145)
T4 FREE SERPL-MCNC: 1 NG/DL — SIGNIFICANT CHANGE UP (ref 0.9–1.8)
TRIGL SERPL-MCNC: 97 MG/DL — SIGNIFICANT CHANGE UP
TSH SERPL-MCNC: 0.6 UIU/ML — SIGNIFICANT CHANGE UP (ref 0.27–4.2)
WBC # BLD: 5.61 K/UL — SIGNIFICANT CHANGE UP (ref 3.8–10.5)
WBC # FLD AUTO: 5.61 K/UL — SIGNIFICANT CHANGE UP (ref 3.8–10.5)

## 2024-10-29 PROCEDURE — 99232 SBSQ HOSP IP/OBS MODERATE 35: CPT

## 2024-10-29 PROCEDURE — 99223 1ST HOSP IP/OBS HIGH 75: CPT

## 2024-10-29 RX ORDER — INSULIN LISPRO 100/ML
2 VIAL (ML) SUBCUTANEOUS
Refills: 0 | Status: DISCONTINUED | OUTPATIENT
Start: 2024-10-29 | End: 2024-10-31

## 2024-10-29 RX ADMIN — SODIUM CHLORIDE 3 MILLILITER(S): 9 INJECTION, SOLUTION INTRAMUSCULAR; INTRAVENOUS; SUBCUTANEOUS at 22:00

## 2024-10-29 RX ADMIN — Medication 20 MILLIGRAM(S): at 21:53

## 2024-10-29 RX ADMIN — Medication 4: at 18:19

## 2024-10-29 RX ADMIN — Medication 2 UNIT(S): at 18:20

## 2024-10-29 RX ADMIN — Medication 50 MILLIGRAM(S): at 17:08

## 2024-10-29 RX ADMIN — Medication 2: at 12:47

## 2024-10-29 RX ADMIN — Medication 50 MILLIGRAM(S): at 05:29

## 2024-10-29 RX ADMIN — SODIUM CHLORIDE 3 MILLILITER(S): 9 INJECTION, SOLUTION INTRAMUSCULAR; INTRAVENOUS; SUBCUTANEOUS at 14:00

## 2024-10-29 RX ADMIN — PANTOPRAZOLE SODIUM 40 MILLIGRAM(S): 40 TABLET, DELAYED RELEASE ORAL at 05:30

## 2024-10-29 RX ADMIN — Medication 60 MILLIGRAM(S): at 05:30

## 2024-10-29 RX ADMIN — Medication 1 TABLET(S): at 11:50

## 2024-10-29 RX ADMIN — SODIUM CHLORIDE 3 MILLILITER(S): 9 INJECTION, SOLUTION INTRAMUSCULAR; INTRAVENOUS; SUBCUTANEOUS at 06:00

## 2024-10-29 RX ADMIN — LOSARTAN POTASSIUM 50 MILLIGRAM(S): 25 TABLET ORAL at 05:30

## 2024-10-29 RX ADMIN — Medication 2: at 10:13

## 2024-10-29 RX ADMIN — POLYETHYLENE GLYCOL 3350 17 GRAM(S): 17 POWDER, FOR SOLUTION ORAL at 11:50

## 2024-10-29 NOTE — PROGRESS NOTE ADULT - SUBJECTIVE AND OBJECTIVE BOX
INTERVAL EVENTS:  Follow up pituitary macroadenoma. No acute complaints at time of exam.     MEDICATIONS  (STANDING):  atorvastatin 20 milliGRAM(s) Oral at bedtime  dextrose 5%. 1000 milliLiter(s) (50 mL/Hr) IV Continuous <Continuous>  dextrose 5%. 1000 milliLiter(s) (100 mL/Hr) IV Continuous <Continuous>  dextrose 50% Injectable 25 Gram(s) IV Push once  dextrose 50% Injectable 25 Gram(s) IV Push once  dextrose 50% Injectable 12.5 Gram(s) IV Push once  glucagon  Injectable 1 milliGRAM(s) IntraMuscular once  influenza   Vaccine 0.5 milliLiter(s) IntraMuscular once  insulin lispro (ADMELOG) corrective regimen sliding scale   SubCutaneous at bedtime  insulin lispro (ADMELOG) corrective regimen sliding scale   SubCutaneous three times a day before meals  losartan 50 milliGRAM(s) Oral daily  metoprolol succinate ER 50 milliGRAM(s) Oral two times a day  multivitamin 1 Tablet(s) Oral daily  NIFEdipine XL 60 milliGRAM(s) Oral daily  pantoprazole    Tablet 40 milliGRAM(s) Oral before breakfast  polyethylene glycol 3350 17 Gram(s) Oral daily  senna 2 Tablet(s) Oral at bedtime  sodium chloride 0.9% lock flush 3 milliLiter(s) IV Push every 8 hours    MEDICATIONS  (PRN):  acetaminophen     Tablet .. 650 milliGRAM(s) Oral every 6 hours PRN Moderate Pain (4 - 6)  dextrose Oral Gel 15 Gram(s) Oral once PRN Blood Glucose LESS THAN 70 milliGRAM(s)/deciliter  oxyCODONE    IR 5 milliGRAM(s) Oral every 6 hours PRN Severe Pain (7 - 10)    Allergies  No Known Allergies    Vital Signs Last 24 Hrs  T(C): 37.3 (29 Oct 2024 08:00), Max: 37.3 (29 Oct 2024 08:00)  T(F): 99.1 (29 Oct 2024 08:00), Max: 99.1 (29 Oct 2024 08:00)  HR: 70 (29 Oct 2024 08:00) (62 - 75)  BP: 103/69 (29 Oct 2024 08:00) (103/67 - 138/88)  BP(mean): 89 (29 Oct 2024 02:01) (84 - 89)  RR: 18 (29 Oct 2024 08:00) (18 - 19)  SpO2: 96% (29 Oct 2024 08:00) (92% - 98%)    Parameters below as of 29 Oct 2024 08:00  Patient On (Oxygen Delivery Method): room air    PHYSICAL EXAM:  General: No apparent distress  Respiratory: Lungs clear bilaterally  Cardiac: +S1, S2, no m/r/g  GI: +BS, soft, non tender, non distended  Extremities: No peripheral edema  Neuro: A+O X3    LABS:                        11.8   5.61  )-----------( 158      ( 29 Oct 2024 05:12 )             34.7     10-29    136  |  98  |  11.1  ----------------------------<  149[H]  4.0   |  26.0  |  0.80    Ca    8.7      29 Oct 2024 05:12  Phos  3.6     10-29  Mg     1.9     10-29    TPro  6.9  /  Alb  3.8  /  TBili  0.9  /  DBili  x   /  AST  35  /  ALT  14  /  AlkPhos  62  10-29    Urinalysis Basic - ( 29 Oct 2024 05:12 )    Color: x / Appearance: x / SG: x / pH: x  Gluc: 149 mg/dL / Ketone: x  / Bili: x / Urobili: x   Blood: x / Protein: x / Nitrite: x   Leuk Esterase: x / RBC: x / WBC x   Sq Epi: x / Non Sq Epi: x / Bacteria: x          POCT Blood Glucose.: 276 mg/dL (10-28-24 @ 21:34)  POCT Blood Glucose.: 178 mg/dL (10-28-24 @ 17:10)  POCT Blood Glucose.: 175 mg/dL (10-28-24 @ 13:27)        Thyroid Stimulating Hormone, Serum: 0.60 uIU/mL (10-29-24 @ 05:12)  Thyroid Stimulating Hormone, Serum: 0.38 uIU/mL (10-28-24 @ 00:47)   INTERVAL EVENTS:  Follow up pituitary macroadenoma. complains of GERD    MEDICATIONS  (STANDING):  atorvastatin 20 milliGRAM(s) Oral at bedtime  dextrose 5%. 1000 milliLiter(s) (50 mL/Hr) IV Continuous <Continuous>  dextrose 5%. 1000 milliLiter(s) (100 mL/Hr) IV Continuous <Continuous>  dextrose 50% Injectable 25 Gram(s) IV Push once  dextrose 50% Injectable 25 Gram(s) IV Push once  dextrose 50% Injectable 12.5 Gram(s) IV Push once  glucagon  Injectable 1 milliGRAM(s) IntraMuscular once  influenza   Vaccine 0.5 milliLiter(s) IntraMuscular once  insulin lispro (ADMELOG) corrective regimen sliding scale   SubCutaneous at bedtime  insulin lispro (ADMELOG) corrective regimen sliding scale   SubCutaneous three times a day before meals  losartan 50 milliGRAM(s) Oral daily  metoprolol succinate ER 50 milliGRAM(s) Oral two times a day  multivitamin 1 Tablet(s) Oral daily  NIFEdipine XL 60 milliGRAM(s) Oral daily  pantoprazole    Tablet 40 milliGRAM(s) Oral before breakfast  polyethylene glycol 3350 17 Gram(s) Oral daily  senna 2 Tablet(s) Oral at bedtime  sodium chloride 0.9% lock flush 3 milliLiter(s) IV Push every 8 hours    MEDICATIONS  (PRN):  acetaminophen     Tablet .. 650 milliGRAM(s) Oral every 6 hours PRN Moderate Pain (4 - 6)  dextrose Oral Gel 15 Gram(s) Oral once PRN Blood Glucose LESS THAN 70 milliGRAM(s)/deciliter  oxyCODONE    IR 5 milliGRAM(s) Oral every 6 hours PRN Severe Pain (7 - 10)    Allergies  No Known Allergies    Vital Signs Last 24 Hrs  T(C): 37.3 (29 Oct 2024 08:00), Max: 37.3 (29 Oct 2024 08:00)  T(F): 99.1 (29 Oct 2024 08:00), Max: 99.1 (29 Oct 2024 08:00)  HR: 70 (29 Oct 2024 08:00) (62 - 75)  BP: 103/69 (29 Oct 2024 08:00) (103/67 - 138/88)  BP(mean): 89 (29 Oct 2024 02:01) (84 - 89)  RR: 18 (29 Oct 2024 08:00) (18 - 19)  SpO2: 96% (29 Oct 2024 08:00) (92% - 98%)    Parameters below as of 29 Oct 2024 08:00  Patient On (Oxygen Delivery Method): room air    PHYSICAL EXAM:  General: No apparent distress  Respiratory: Lungs clear bilaterally  Cardiac: +S1, S2, no m/r/g  GI: +BS, soft, non tender, non distended  Extremities: No peripheral edema  Neuro: A+O X3    LABS:                        11.8   5.61  )-----------( 158      ( 29 Oct 2024 05:12 )             34.7     10-29    136  |  98  |  11.1  ----------------------------<  149[H]  4.0   |  26.0  |  0.80    Ca    8.7      29 Oct 2024 05:12  Phos  3.6     10-29  Mg     1.9     10-29    TPro  6.9  /  Alb  3.8  /  TBili  0.9  /  DBili  x   /  AST  35  /  ALT  14  /  AlkPhos  62  10-29    Urinalysis Basic - ( 29 Oct 2024 05:12 )    Color: x / Appearance: x / SG: x / pH: x  Gluc: 149 mg/dL / Ketone: x  / Bili: x / Urobili: x   Blood: x / Protein: x / Nitrite: x   Leuk Esterase: x / RBC: x / WBC x   Sq Epi: x / Non Sq Epi: x / Bacteria: x          POCT Blood Glucose.: 276 mg/dL (10-28-24 @ 21:34)  POCT Blood Glucose.: 178 mg/dL (10-28-24 @ 17:10)  POCT Blood Glucose.: 175 mg/dL (10-28-24 @ 13:27)        Thyroid Stimulating Hormone, Serum: 0.60 uIU/mL (10-29-24 @ 05:12)  Thyroid Stimulating Hormone, Serum: 0.38 uIU/mL (10-28-24 @ 00:47)

## 2024-10-29 NOTE — PROGRESS NOTE ADULT - ASSESSMENT
63M with PMH of DM, HTN, HLD, GERD, on ASA81 daily, known history of pituitary tumor diagnosed 8 years ago in [place] with no follow up since then, presents with generalized weakness x 1 day. Neurosurgery admitting for 2.7 x 2.7 x 3.9 cm pituitary mass with chiasmatic encroachment.    Plan:  - Q4 neuro checks  - All imaging reviewed: CTH and MRI Brain shows 2.7 x 2.7 x 3.9 cm pituitary mass with chiasmatic encroachment  - Endocrine recs appreciated; AM labs pending  - Cardiology consult, w/u, recs and clearance appreciated  - SBP goal <160  - Resume home HTN/HLD meds  - Medicine consult and clearance pending  - Hold steroids until 8AM Cortisol   - Hold ASA  - Bowel Regimen: Senna and Miralax  - DVT ppx: SCDs only  - Discussed with Dr. Harmno

## 2024-10-29 NOTE — OCCUPATIONAL THERAPY INITIAL EVALUATION ADULT - PERTINENT HX OF CURRENT PROBLEM, REHAB EVAL
As per MD note:  63M with PMH of DM, HTN, HLD, GERD, on ASA81 daily, known history of pituitary tumor diagnosed 8 years ago in [facility] with no follow up since then, presents with generalized weakness x 1 day. Patient reports difficulty getting out of his chair with unsteady gait since this morning. Patient currently c/o mild headache and burning with urination. Denies any recent falls/trauma, vision changes, dizziness, nausea/vomiting, numbness, or tingling. 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 Pt also noted to have TWI on EKG, cards consulted and further w/u pending. Pt reportedly claims to be compliant on home cardiac medications. Pt admitted to neurosurgery for likely surgical resection of pituitary tumor with Dr. Harmon.

## 2024-10-29 NOTE — PROGRESS NOTE ADULT - SUBJECTIVE AND OBJECTIVE BOX
HPI: 63M with PMH of DM, HTN, HLD, GERD, on ASA81 daily, known history of pituitary tumor diagnosed 8 years ago in [place] with no follow up since then, presents with generalized weakness x 1 day. Patient reports difficulty getting out of his chair with unsteady gait since this morning. Patient currently c/o mild headache and burning with urination. Denies any recent falls/trauma, vision changes, dizziness, nausea/vomiting, numbness, or tingling. 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 Pt also noted to have TWI on EKG, cards consulted and further w/u pending. Pt reportedly claims to be compliant on home cardiac medications. Pt admitted to neurosurgery for likely surgical resection of pituitary tumor with Dr. Harmon.    INTERVAL HPI/OVERNIGHT EVENTS:  63y Male seen lying comfortably in bed. Tolerating diet. Passing gas/BM. Voiding. Denies headache, weakness, numbness, n/v/d, fevers, chills, chest pain, SOB.     Vital Signs Last 24 Hrs  T(C): 37.3 (29 Oct 2024 08:00), Max: 37.3 (29 Oct 2024 08:00)  T(F): 99.1 (29 Oct 2024 08:00), Max: 99.1 (29 Oct 2024 08:00)  HR: 70 (29 Oct 2024 08:00) (62 - 75)  BP: 103/69 (29 Oct 2024 08:00) (103/67 - 138/88)  BP(mean): 89 (29 Oct 2024 02:01) (84 - 89)  RR: 18 (29 Oct 2024 08:00) (18 - 19)  SpO2: 96% (29 Oct 2024 08:00) (92% - 98%)    Parameters below as of 29 Oct 2024 08:00  Patient On (Oxygen Delivery Method): room air    PHYSICAL EXAM:  GENERAL: NAD, well-groomed  HEAD:  Atraumatic, normocephalic  AMARIS COMA SCORE: E- V- M- = 15  MENTAL STATUS: AAO x3; Awake; Opens eyes spontaneously; Appropriately conversant without aphasia; following simple commands  CRANIAL NERVES: PERRL. EOMI without nystagmus. Face symmetric w/ normal eye closure and smile, tongue midline. Hearing grossly intact. Speech clear. Head turning and shoulder shrug intact.   MOTOR: strength 5/5 b/l upper and lower extremities  EXTREMITIES: no clubbing, cyanosis, or edema  SKIN: Warm, dry    LABS:                        11.8   5.61  )-----------( 158      ( 29 Oct 2024 05:12 )             34.7     10-29    136  |  98  |  11.1  ----------------------------<  149[H]  4.0   |  26.0  |  0.80    Ca    8.7      29 Oct 2024 05:12  Phos  3.6     10-29  Mg     1.9     10-29    TPro  6.9  /  Alb  3.8  /  TBili  0.9  /  DBili  x   /  AST  35  /  ALT  14  /  AlkPhos  62  10-29    PT/INR - ( 27 Oct 2024 16:49 )   PT: 13.7 sec;   INR: 1.18 ratio         PTT - ( 27 Oct 2024 16:49 )  PTT:31.1 sec  Urinalysis Basic - ( 29 Oct 2024 05:12 )    Color: x / Appearance: x / SG: x / pH: x  Gluc: 149 mg/dL / Ketone: x  / Bili: x / Urobili: x   Blood: x / Protein: x / Nitrite: x   Leuk Esterase: x / RBC: x / WBC x   Sq Epi: x / Non Sq Epi: x / Bacteria: x    10-28 @ 07:01  -  10-29 @ 07:00  --------------------------------------------------------  IN: 350 mL / OUT: 0 mL / NET: 350 mL    RADIOLOGY & ADDITIONAL TESTS:    < from: MR Sella alone w/wo IV Cont (10.28.24 @ 07:26) >  IMPRESSION:    1. 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    2. BRAIN:   Ischemic white matter disease greater than typical for age.   Diffuse brain volume loss typical for age.    < from: CT Angio Cardiac w/ IV Cont (10.28.24 @ 09:06) >  Impression:    Cardiac:  1. There is minimal non-obstructive coronary artery disease (CAD) of the   mid LAD, mid LCx and mid RCA consistent with CAD-RADS 1. Co-dominant   coronary circulation (PDA originates from RCA and PLV originates from   LCx).  2. Observed calcium score 58 is at percentile 52  for individuals of same   age, gender, and race/ethnicity who are free of clinical cardiovascular   disease and treated diabetes mellitus ([facility] [employer] database).  3. Aortic valve with trivial calcification.  4. Mild sinotubular junction calcification.

## 2024-10-29 NOTE — CONSULT NOTE ADULT - ASSESSMENT
63 M with PMH of DM, HTN, HLD, GERD, on ASA81 daily, known history of pituitary tumor diagnosed 8 years ago in [facility] with no follow up since then, presents with generalized weakness x 1 day. Patient reports difficulty getting out of his chair with unsteady gait since this morning. Patient currently c/o mild headache and burning with urination. Denies any recent falls/trauma, vision changes, dizziness, nausea/vomiting, numbness, or tingling. 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 Pt also noted to have TWI on EKG, cards consulted and further w/u pending. Pt reportedly claims to be compliant on home cardiac medications. Pt admitted to neurosurgery for likely surgical resection of pituitary tumor with Dr. Harmon.     Pituitary tumor       Abnormal EKG      DM       HTN  HLD        63 M with PMH of DM, HTN, HLD, GERD, on ASA81 daily, known history of pituitary tumor diagnosed 8 years ago in [facility] with no follow up since then, presents with generalized weakness x 1 day. Patient reports difficulty getting out of his chair with unsteady gait since this morning. Patient currently c/o mild headache and burning with urination. Denies any recent falls/trauma, vision changes, dizziness, nausea/vomiting, numbness, or tingling. 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 Pt also noted to have TWI on EKG, cards consulted and further w/u pending. Pt reportedly claims to be compliant on home cardiac medications. Pt admitted to neurosurgery for likely surgical resection of pituitary tumor with Dr. Harmon.     Pituitary tumor   - MRI brain reviewed: Large pituitary tumor, likely adenoma, extensively elevates and distorts the optic chiasm and forebrain, invades the sphenoid sinus to the right of midline and likely invades the right cavernous sinus.  - Neuro checks   - NSG plan for surgical resection this admission   - SBP goal <160  - Hormonal evaluation per Endocrine, in progress   - Pt is medically optimized for Surgery     Abnormal EKG  - TTE reviewed  - Cardiac CTA done: Minimal non-obstructive CAD  - Cardio on board     DM   - A1C   - c/e Insulin and ISS  - BG monitoring     HTN  HLD   - c/w Toprol XL, Losartan, Nifedipine  - c/w statin     Will follow patient.

## 2024-10-29 NOTE — PROGRESS NOTE ADULT - ASSESSMENT
63M with PMH of DM, HTN, HLD, GERD, on ASA81 daily, known history of pituitary tumor diagnosed 8 years ago in [facility] with no follow up since then, presents with generalized weakness x 1 day and found to have large pituitary mass extensively   elevates and distorts the optic chiasm and forebrain, invades the sphenoid sinus to the right of midline and likely invades the right cavernous sinus.    1. Pituitary macroadenoma with optic chiasm compression -hormonal evaluation in progress  - Prolactin and prolactin by dilution normal  - Low GH levels, IGF-1 pending  - TSH normal, but unreliable in setting of pituitary disease --> FT4 pending  - Normal ACTH, AM cortisol pending  - Neuro surgery following, possible surgery    2. T2DM, a1c 7.1%  - Continue correction scale  - If FS remain elevated, will add insulin regimen    3. HLD  - Continue statin    4. Abnormal EKG    - Cardiology following, s/p TTE   63M with PMH of DM, HTN, HLD, GERD, on ASA81 daily, known history of pituitary tumor diagnosed 8 years ago in [facility] with no follow up since then, presents with generalized weakness x 1 day and found to have large pituitary mass extensively   elevates and distorts the optic chiasm and forebrain, invades the sphenoid sinus to the right of midline and likely invades the right cavernous sinus.    1. Pituitary macroadenoma with optic chiasm compression -hormonal evaluation in progress  - Prolactin and prolactin by dilution normal  - Low GH levels, IGF-1 pending  - TSH normal, but unreliable in setting of pituitary disease --> FT4 pending  - Normal ACTH, AM cortisol pending  - Neuro surgery following, possible surgery    2. T2DM, a1c 7.1%  - Start admelog 2 units TID  - Continue correction scale    3. HLD  - Continue statin    4. Abnormal EKG    - Cardiology following, s/p TTE   63M with PMH of DM, HTN, HLD, GERD, on ASA81 daily, known history of pituitary tumor diagnosed 8 years ago in [facility] with no follow up since then, presents with generalized weakness x 1 day and found to have large pituitary mass extensively   elevates and distorts the optic chiasm and forebrain, invades the sphenoid sinus to the right of midline and likely invades the right cavernous sinus.    1. Pituitary macroadenoma with optic chiasm compression -hormonal evaluation in progress  - Prolactin and prolactin by dilution normal  - Low GH levels, IGF-1 pending  - TSH normal, but unreliable in setting of pituitary disease --> FT4 pending  - Normal ACTH, AM cortisol pending  - Neuro surgery following, possible surgery    2. T2DM, a1c 7.1%  - Start admelog 2 units TID  - Continue correction scale    3. HLD  - Continue statin    4. Abnormal EKG    - Cardiology following, s/p TTE    5. GERD - on protonix, if no improvement consider GI input

## 2024-10-29 NOTE — CONSULT NOTE ADULT - SUBJECTIVE AND OBJECTIVE BOX
Patient is a 63y old  Male who presents with a chief complaint of Pituitary Tumor (29 Oct 2024 09:51)    HPI: 63M with PMH of DM, HTN, HLD, GERD, on ASA81 daily, known history of pituitary tumor diagnosed 8 years ago in [facility] with no follow up since then, presents with generalized weakness x 1 day. Patient reports difficulty getting out of his chair with unsteady gait since this morning. Patient currently c/o mild headache and burning with urination. Denies any recent falls/trauma, vision changes, dizziness, nausea/vomiting, numbness, or tingling. 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 Pt also noted to have TWI on EKG, cards consulted and further w/u pending. Pt reportedly claims to be compliant on home cardiac medications. Pt admitted to neurosurgery for likely surgical resection of pituitary tumor with Dr. Harmon.      Interval History:      PAST MEDICAL & SURGICAL HISTORY:  HTN (hypertension)  HLD (hyperlipidemia)  DM (diabetes mellitus)  GERD (gastroesophageal reflux disease)  H/O: pituitary tumor  No significant past surgical history      Social History:  Tobacco -   ETOH -   Illicit drug abuse - denies    FAMILY HISTORY:  No pertinent family history in first degree relatives        Allergies    No Known Allergies    Intolerances        HOME MEDICATIONS :   atorvastatin 20 mg oral tablet: 1 tab(s) orally once a day (at bedtime) (28 Oct 2024 16:14)  losartan 50 mg oral tablet: 1 tab(s) orally once a day (28 Oct 2024 16:14)  metFORMIN 500 mg oral tablet: 1 tab(s) orally 2 times a day (28 Oct 2024 16:14)  metoprolol succinate 50 mg oral tablet, extended release: 1 tab(s) orally 2 times a day (28 Oct 2024 16:14)  NIFEdipine (Eqv-Adalat CC) 60 mg oral tablet, extended release: 1 tab(s) orally once a day (28 Oct 2024 16:14)      REVIEW OF SYSTEMS:    CONSTITUTIONAL: No weakness, fevers or chills  EYES/ENT: No visual changes;  No vertigo or throat pain   NECK: No pain or stiffness  RESPIRATORY: No cough, wheezing, hemoptysis; No shortness of breath  CARDIOVASCULAR: No chest pain or palpitations  GASTROINTESTINAL: No abdominal or epigastric pain. No nausea, vomiting   GENITOURINARY: No dysuria, frequency or hematuria  NEUROLOGICAL: No numbness or weakness        MEDICATIONS  (STANDING):  atorvastatin 20 milliGRAM(s) Oral at bedtime  dextrose 5%. 1000 milliLiter(s) (100 mL/Hr) IV Continuous <Continuous>  dextrose 5%. 1000 milliLiter(s) (50 mL/Hr) IV Continuous <Continuous>  dextrose 50% Injectable 12.5 Gram(s) IV Push once  dextrose 50% Injectable 25 Gram(s) IV Push once  dextrose 50% Injectable 25 Gram(s) IV Push once  glucagon  Injectable 1 milliGRAM(s) IntraMuscular once  influenza   Vaccine 0.5 milliLiter(s) IntraMuscular once  insulin lispro (ADMELOG) corrective regimen sliding scale   SubCutaneous three times a day before meals  insulin lispro (ADMELOG) corrective regimen sliding scale   SubCutaneous at bedtime  losartan 50 milliGRAM(s) Oral daily  metoprolol succinate ER 50 milliGRAM(s) Oral two times a day  multivitamin 1 Tablet(s) Oral daily  NIFEdipine XL 60 milliGRAM(s) Oral daily  pantoprazole    Tablet 40 milliGRAM(s) Oral before breakfast  polyethylene glycol 3350 17 Gram(s) Oral daily  senna 2 Tablet(s) Oral at bedtime  sodium chloride 0.9% lock flush 3 milliLiter(s) IV Push every 8 hours    MEDICATIONS  (PRN):  acetaminophen     Tablet .. 650 milliGRAM(s) Oral every 6 hours PRN Moderate Pain (4 - 6)  dextrose Oral Gel 15 Gram(s) Oral once PRN Blood Glucose LESS THAN 70 milliGRAM(s)/deciliter  oxyCODONE    IR 5 milliGRAM(s) Oral every 6 hours PRN Severe Pain (7 - 10)      Vital Signs Last 24 Hrs  T(C): 37.3 (29 Oct 2024 08:00), Max: 37.3 (29 Oct 2024 08:00)  T(F): 99.1 (29 Oct 2024 08:00), Max: 99.1 (29 Oct 2024 08:00)  HR: 70 (29 Oct 2024 08:00) (62 - 75)  BP: 103/69 (29 Oct 2024 08:00) (103/67 - 138/88)  BP(mean): 89 (29 Oct 2024 02:01) (84 - 89)  RR: 18 (29 Oct 2024 08:00) (18 - 19)  SpO2: 96% (29 Oct 2024 08:00) (92% - 98%)    Parameters below as of 29 Oct 2024 08:00  Patient On (Oxygen Delivery Method): room air        PHYSICAL EXAM:    CONSTITUTIONAL: Awake, alert and in no apparent distress  CARDIAC: Normal rate, regular rhythm.  Heart sounds S1, S2.    RESPIRATORY: Breath sounds clear and equal bilaterally.   ABDOMINAL: Nontender to palpation. No rebound/ guarding   EXTREMITIES: No edema, cyanosis or deformity   NEUROLOGICAL: Alert and oriented, no focal deficits       LABS:                        11.8   5.61  )-----------( 158      ( 29 Oct 2024 05:12 )             34.7     10-29    136  |  98  |  11.1  ----------------------------<  149[H]  4.0   |  26.0  |  0.80    Ca    8.7      29 Oct 2024 05:12  Phos  3.6     10-29  Mg     1.9     10-29    TPro  6.9  /  Alb  3.8  /  TBili  0.9  /  DBili  x   /  AST  35  /  ALT  14  /  AlkPhos  62  10-29    PT/INR - ( 27 Oct 2024 16:49 )   PT: 13.7 sec;   INR: 1.18 ratio         PTT - ( 27 Oct 2024 16:49 )  PTT:31.1 sec  Urinalysis Basic - ( 29 Oct 2024 05:12 )    Color: x / Appearance: x / SG: x / pH: x  Gluc: 149 mg/dL / Ketone: x  / Bili: x / Urobili: x   Blood: x / Protein: x / Nitrite: x   Leuk Esterase: x / RBC: x / WBC x   Sq Epi: x / Non Sq Epi: x / Bacteria: x         Patient is a 63y old  Male who presents with a chief complaint of Pituitary Tumor (29 Oct 2024 09:51)    HPI: 63M with PMH of DM, HTN, HLD, GERD, on ASA81 daily, known history of pituitary tumor diagnosed 8 years ago in [facility] with no follow up since then, presents with generalized weakness x 1 day. Patient reports difficulty getting out of his chair with unsteady gait since this morning. Patient currently c/o mild headache and burning with urination. Denies any recent falls/trauma, vision changes, dizziness, nausea/vomiting, numbness, or tingling. 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 Pt also noted to have TWI on EKG, cards consulted. Pt reportedly claims to be compliant on home cardiac medications. Pt admitted to neurosurgery for likely surgical resection of pituitary tumor with Dr. Harmon.    Interval History:   used   Pt reports heartburn and headache     PAST MEDICAL & SURGICAL HISTORY:  HTN (hypertension)  HLD (hyperlipidemia)  DM (diabetes mellitus)  GERD (gastroesophageal reflux disease)  H/O: pituitary tumor  No significant past surgical history      Social History:  Tobacco - denies  ETOH - denies   Illicit drug abuse - denies    FAMILY HISTORY:  No pertinent family history in first degree relatives        Allergies    No Known Allergies    Intolerances        HOME MEDICATIONS :   atorvastatin 20 mg oral tablet: 1 tab(s) orally once a day (at bedtime) (28 Oct 2024 16:14)  losartan 50 mg oral tablet: 1 tab(s) orally once a day (28 Oct 2024 16:14)  metFORMIN 500 mg oral tablet: 1 tab(s) orally 2 times a day (28 Oct 2024 16:14)  metoprolol succinate 50 mg oral tablet, extended release: 1 tab(s) orally 2 times a day (28 Oct 2024 16:14)  NIFEdipine (Eqv-Adalat CC) 60 mg oral tablet, extended release: 1 tab(s) orally once a day (28 Oct 2024 16:14)      REVIEW OF SYSTEMS:    CONSTITUTIONAL: No weakness, fevers or chills  EYES/ENT: No visual changes;  No vertigo or throat pain   NECK: No pain or stiffness  RESPIRATORY: No cough, wheezing, hemoptysis; No shortness of breath  CARDIOVASCULAR: No chest pain or palpitations  GASTROINTESTINAL: No abdominal or epigastric pain. No nausea, vomiting   GENITOURINARY: No dysuria, frequency or hematuria  NEUROLOGICAL: +headache         MEDICATIONS  (STANDING):  atorvastatin 20 milliGRAM(s) Oral at bedtime  dextrose 5%. 1000 milliLiter(s) (100 mL/Hr) IV Continuous <Continuous>  dextrose 5%. 1000 milliLiter(s) (50 mL/Hr) IV Continuous <Continuous>  dextrose 50% Injectable 12.5 Gram(s) IV Push once  dextrose 50% Injectable 25 Gram(s) IV Push once  dextrose 50% Injectable 25 Gram(s) IV Push once  glucagon  Injectable 1 milliGRAM(s) IntraMuscular once  influenza   Vaccine 0.5 milliLiter(s) IntraMuscular once  insulin lispro (ADMELOG) corrective regimen sliding scale   SubCutaneous three times a day before meals  insulin lispro (ADMELOG) corrective regimen sliding scale   SubCutaneous at bedtime  losartan 50 milliGRAM(s) Oral daily  metoprolol succinate ER 50 milliGRAM(s) Oral two times a day  multivitamin 1 Tablet(s) Oral daily  NIFEdipine XL 60 milliGRAM(s) Oral daily  pantoprazole    Tablet 40 milliGRAM(s) Oral before breakfast  polyethylene glycol 3350 17 Gram(s) Oral daily  senna 2 Tablet(s) Oral at bedtime  sodium chloride 0.9% lock flush 3 milliLiter(s) IV Push every 8 hours    MEDICATIONS  (PRN):  acetaminophen     Tablet .. 650 milliGRAM(s) Oral every 6 hours PRN Moderate Pain (4 - 6)  dextrose Oral Gel 15 Gram(s) Oral once PRN Blood Glucose LESS THAN 70 milliGRAM(s)/deciliter  oxyCODONE    IR 5 milliGRAM(s) Oral every 6 hours PRN Severe Pain (7 - 10)      Vital Signs Last 24 Hrs  T(C): 37.3 (29 Oct 2024 08:00), Max: 37.3 (29 Oct 2024 08:00)  T(F): 99.1 (29 Oct 2024 08:00), Max: 99.1 (29 Oct 2024 08:00)  HR: 70 (29 Oct 2024 08:00) (62 - 75)  BP: 103/69 (29 Oct 2024 08:00) (103/67 - 138/88)  BP(mean): 89 (29 Oct 2024 02:01) (84 - 89)  RR: 18 (29 Oct 2024 08:00) (18 - 19)  SpO2: 96% (29 Oct 2024 08:00) (92% - 98%)    Parameters below as of 29 Oct 2024 08:00  Patient On (Oxygen Delivery Method): room air        PHYSICAL EXAM:    CONSTITUTIONAL: Awake, alert and in no apparent distress  CARDIAC: Normal rate, regular rhythm.  Heart sounds S1, S2.    RESPIRATORY: Breath sounds clear and equal bilaterally.   ABDOMINAL: Nontender to palpation. No rebound/ guarding   EXTREMITIES: No edema, cyanosis or deformity   NEUROLOGICAL: Alert and oriented, no focal deficits       LABS:                        11.8   5.61  )-----------( 158      ( 29 Oct 2024 05:12 )             34.7     10-29    136  |  98  |  11.1  ----------------------------<  149[H]  4.0   |  26.0  |  0.80    Ca    8.7      29 Oct 2024 05:12  Phos  3.6     10-29  Mg     1.9     10-29    TPro  6.9  /  Alb  3.8  /  TBili  0.9  /  DBili  x   /  AST  35  /  ALT  14  /  AlkPhos  62  10-29    PT/INR - ( 27 Oct 2024 16:49 )   PT: 13.7 sec;   INR: 1.18 ratio         PTT - ( 27 Oct 2024 16:49 )  PTT:31.1 sec  Urinalysis Basic - ( 29 Oct 2024 05:12 )    Color: x / Appearance: x / SG: x / pH: x  Gluc: 149 mg/dL / Ketone: x  / Bili: x / Urobili: x   Blood: x / Protein: x / Nitrite: x   Leuk Esterase: x / RBC: x / WBC x   Sq Epi: x / Non Sq Epi: x / Bacteria: x         Patient is a 63y old  Male who presents with a chief complaint of Pituitary Tumor (29 Oct 2024 09:51)    HPI: 63M with PMH of DM, HTN, HLD, GERD, on ASA81 daily, known history of pituitary tumor diagnosed 8 years ago in [facility] with no follow up since then, presents with generalized weakness x 1 day. Patient reports difficulty getting out of his chair with unsteady gait since this morning. Patient currently c/o mild headache and burning with urination. Denies any recent falls/trauma, vision changes, dizziness, nausea/vomiting, numbness, or tingling. 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 Pt also noted to have TWI on EKG, cards consulted. Pt reportedly claims to be compliant on home cardiac medications. Pt admitted to neurosurgery for likely surgical resection of pituitary tumor with Dr. Harmon.    Interval History:  [place]  used   Pt reports heartburn and headache     PAST MEDICAL & SURGICAL HISTORY:  HTN (hypertension)  HLD (hyperlipidemia)  DM (diabetes mellitus)  GERD (gastroesophageal reflux disease)  H/O: pituitary tumor  No significant past surgical history      Social History:  Tobacco - denies  ETOH - denies   Illicit drug abuse - denies    FAMILY HISTORY:  No pertinent family history in first degree relatives        Allergies    No Known Allergies    Intolerances        HOME MEDICATIONS :   atorvastatin 20 mg oral tablet: 1 tab(s) orally once a day (at bedtime) (28 Oct 2024 16:14)  losartan 50 mg oral tablet: 1 tab(s) orally once a day (28 Oct 2024 16:14)  metFORMIN 500 mg oral tablet: 1 tab(s) orally 2 times a day (28 Oct 2024 16:14)  metoprolol succinate 50 mg oral tablet, extended release: 1 tab(s) orally 2 times a day (28 Oct 2024 16:14)  NIFEdipine (Eqv-Adalat CC) 60 mg oral tablet, extended release: 1 tab(s) orally once a day (28 Oct 2024 16:14)      REVIEW OF SYSTEMS:    CONSTITUTIONAL: No weakness, fevers or chills  EYES/ENT: No visual changes;  No vertigo or throat pain   NECK: No pain or stiffness  RESPIRATORY: No cough, wheezing, hemoptysis; No shortness of breath  CARDIOVASCULAR: No chest pain or palpitations  GASTROINTESTINAL: No abdominal or epigastric pain. No nausea, vomiting   GENITOURINARY: No dysuria, frequency or hematuria  NEUROLOGICAL: +headache         MEDICATIONS  (STANDING):  atorvastatin 20 milliGRAM(s) Oral at bedtime  dextrose 5%. 1000 milliLiter(s) (100 mL/Hr) IV Continuous <Continuous>  dextrose 5%. 1000 milliLiter(s) (50 mL/Hr) IV Continuous <Continuous>  dextrose 50% Injectable 12.5 Gram(s) IV Push once  dextrose 50% Injectable 25 Gram(s) IV Push once  dextrose 50% Injectable 25 Gram(s) IV Push once  glucagon  Injectable 1 milliGRAM(s) IntraMuscular once  influenza   Vaccine 0.5 milliLiter(s) IntraMuscular once  insulin lispro (ADMELOG) corrective regimen sliding scale   SubCutaneous three times a day before meals  insulin lispro (ADMELOG) corrective regimen sliding scale   SubCutaneous at bedtime  losartan 50 milliGRAM(s) Oral daily  metoprolol succinate ER 50 milliGRAM(s) Oral two times a day  multivitamin 1 Tablet(s) Oral daily  NIFEdipine XL 60 milliGRAM(s) Oral daily  pantoprazole    Tablet 40 milliGRAM(s) Oral before breakfast  polyethylene glycol 3350 17 Gram(s) Oral daily  senna 2 Tablet(s) Oral at bedtime  sodium chloride 0.9% lock flush 3 milliLiter(s) IV Push every 8 hours    MEDICATIONS  (PRN):  acetaminophen     Tablet .. 650 milliGRAM(s) Oral every 6 hours PRN Moderate Pain (4 - 6)  dextrose Oral Gel 15 Gram(s) Oral once PRN Blood Glucose LESS THAN 70 milliGRAM(s)/deciliter  oxyCODONE    IR 5 milliGRAM(s) Oral every 6 hours PRN Severe Pain (7 - 10)      Vital Signs Last 24 Hrs  T(C): 37.3 (29 Oct 2024 08:00), Max: 37.3 (29 Oct 2024 08:00)  T(F): 99.1 (29 Oct 2024 08:00), Max: 99.1 (29 Oct 2024 08:00)  HR: 70 (29 Oct 2024 08:00) (62 - 75)  BP: 103/69 (29 Oct 2024 08:00) (103/67 - 138/88)  BP(mean): 89 (29 Oct 2024 02:01) (84 - 89)  RR: 18 (29 Oct 2024 08:00) (18 - 19)  SpO2: 96% (29 Oct 2024 08:00) (92% - 98%)    Parameters below as of 29 Oct 2024 08:00  Patient On (Oxygen Delivery Method): room air        PHYSICAL EXAM:    CONSTITUTIONAL: Awake, alert and in no apparent distress  CARDIAC: Normal rate, regular rhythm.  Heart sounds S1, S2.    RESPIRATORY: Breath sounds clear and equal bilaterally.   ABDOMINAL: Nontender to palpation. No rebound/ guarding   EXTREMITIES: No edema, cyanosis or deformity   NEUROLOGICAL: Alert and oriented, no focal deficits       LABS:                        11.8   5.61  )-----------( 158      ( 29 Oct 2024 05:12 )             34.7     10-29    136  |  98  |  11.1  ----------------------------<  149[H]  4.0   |  26.0  |  0.80    Ca    8.7      29 Oct 2024 05:12  Phos  3.6     10-29  Mg     1.9     10-29    TPro  6.9  /  Alb  3.8  /  TBili  0.9  /  DBili  x   /  AST  35  /  ALT  14  /  AlkPhos  62  10-29    PT/INR - ( 27 Oct 2024 16:49 )   PT: 13.7 sec;   INR: 1.18 ratio         PTT - ( 27 Oct 2024 16:49 )  PTT:31.1 sec  Urinalysis Basic - ( 29 Oct 2024 05:12 )    Color: x / Appearance: x / SG: x / pH: x  Gluc: 149 mg/dL / Ketone: x  / Bili: x / Urobili: x   Blood: x / Protein: x / Nitrite: x   Leuk Esterase: x / RBC: x / WBC x   Sq Epi: x / Non Sq Epi: x / Bacteria: x

## 2024-10-30 LAB
-  AMOXICILLIN/CLAVULANIC ACID: SIGNIFICANT CHANGE UP
-  AMPICILLIN/SULBACTAM: SIGNIFICANT CHANGE UP
-  AMPICILLIN: SIGNIFICANT CHANGE UP
-  AZTREONAM: SIGNIFICANT CHANGE UP
-  CEFAZOLIN: SIGNIFICANT CHANGE UP
-  CEFEPIME: SIGNIFICANT CHANGE UP
-  CEFOXITIN: SIGNIFICANT CHANGE UP
-  CEFTRIAXONE: SIGNIFICANT CHANGE UP
-  CIPROFLOXACIN: SIGNIFICANT CHANGE UP
-  ERTAPENEM: SIGNIFICANT CHANGE UP
-  GENTAMICIN: SIGNIFICANT CHANGE UP
-  LEVOFLOXACIN: SIGNIFICANT CHANGE UP
-  MEROPENEM: SIGNIFICANT CHANGE UP
-  NITROFURANTOIN: SIGNIFICANT CHANGE UP
-  PIPERACILLIN/TAZOBACTAM: SIGNIFICANT CHANGE UP
-  TOBRAMYCIN: SIGNIFICANT CHANGE UP
-  TRIMETHOPRIM/SULFAMETHOXAZOLE: SIGNIFICANT CHANGE UP
A1C WITH ESTIMATED AVERAGE GLUCOSE RESULT: 7.2 % — HIGH (ref 4–5.6)
ANION GAP SERPL CALC-SCNC: 13 MMOL/L — SIGNIFICANT CHANGE UP (ref 5–17)
BUN SERPL-MCNC: 19.9 MG/DL — SIGNIFICANT CHANGE UP (ref 8–20)
CALCIUM SERPL-MCNC: 9 MG/DL — SIGNIFICANT CHANGE UP (ref 8.4–10.5)
CHLORIDE SERPL-SCNC: 101 MMOL/L — SIGNIFICANT CHANGE UP (ref 96–108)
CO2 SERPL-SCNC: 25 MMOL/L — SIGNIFICANT CHANGE UP (ref 22–29)
CORTIS AM PEAK SERPL-MCNC: 10.6 UG/DL — SIGNIFICANT CHANGE UP (ref 6–18.4)
CREAT SERPL-MCNC: 1.05 MG/DL — SIGNIFICANT CHANGE UP (ref 0.5–1.3)
CULTURE RESULTS: ABNORMAL
EGFR: 80 ML/MIN/1.73M2 — SIGNIFICANT CHANGE UP
ESTIMATED AVERAGE GLUCOSE: 160 MG/DL — HIGH (ref 68–114)
GLUCOSE BLDC GLUCOMTR-MCNC: 173 MG/DL — HIGH (ref 70–99)
GLUCOSE BLDC GLUCOMTR-MCNC: 223 MG/DL — HIGH (ref 70–99)
GLUCOSE BLDC GLUCOMTR-MCNC: 276 MG/DL — HIGH (ref 70–99)
GLUCOSE BLDC GLUCOMTR-MCNC: 304 MG/DL — HIGH (ref 70–99)
GLUCOSE SERPL-MCNC: 144 MG/DL — HIGH (ref 70–99)
HCT VFR BLD CALC: 33.6 % — LOW (ref 39–50)
HGB BLD-MCNC: 11.3 G/DL — LOW (ref 13–17)
INSULIN-LIKE GROWTH FACTOR 1 INTERPRETATION: SIGNIFICANT CHANGE UP
INSULIN-LIKE GROWTH FACTOR 1: 50 NG/ML — SIGNIFICANT CHANGE UP (ref 46–219)
MAGNESIUM SERPL-MCNC: 2 MG/DL — SIGNIFICANT CHANGE UP (ref 1.6–2.6)
MCHC RBC-ENTMCNC: 28.3 PG — SIGNIFICANT CHANGE UP (ref 27–34)
MCHC RBC-ENTMCNC: 33.6 G/DL — SIGNIFICANT CHANGE UP (ref 32–36)
MCV RBC AUTO: 84.2 FL — SIGNIFICANT CHANGE UP (ref 80–100)
METHOD TYPE: SIGNIFICANT CHANGE UP
ORGANISM # SPEC MICROSCOPIC CNT: ABNORMAL
ORGANISM # SPEC MICROSCOPIC CNT: SIGNIFICANT CHANGE UP
PHOSPHATE SERPL-MCNC: 4.4 MG/DL — SIGNIFICANT CHANGE UP (ref 2.4–4.7)
PLATELET # BLD AUTO: 159 K/UL — SIGNIFICANT CHANGE UP (ref 150–400)
POTASSIUM SERPL-MCNC: 3.9 MMOL/L — SIGNIFICANT CHANGE UP (ref 3.5–5.3)
POTASSIUM SERPL-SCNC: 3.9 MMOL/L — SIGNIFICANT CHANGE UP (ref 3.5–5.3)
PROLACTIN SERPL-MCNC: 6.3 NG/ML — SIGNIFICANT CHANGE UP (ref 4.1–18.4)
RBC # BLD: 3.99 M/UL — LOW (ref 4.2–5.8)
RBC # FLD: 11.4 % — SIGNIFICANT CHANGE UP (ref 10.3–14.5)
SODIUM SERPL-SCNC: 139 MMOL/L — SIGNIFICANT CHANGE UP (ref 135–145)
SPECIMEN SOURCE: SIGNIFICANT CHANGE UP
T4 FREE SERPL-MCNC: 1.1 NG/DL — SIGNIFICANT CHANGE UP (ref 0.9–1.8)
WBC # BLD: 4.63 K/UL — SIGNIFICANT CHANGE UP (ref 3.8–10.5)
WBC # FLD AUTO: 4.63 K/UL — SIGNIFICANT CHANGE UP (ref 3.8–10.5)

## 2024-10-30 PROCEDURE — 99232 SBSQ HOSP IP/OBS MODERATE 35: CPT

## 2024-10-30 RX ORDER — INSULIN GLARGINE,HUM.REC.ANLOG 100/ML
2 VIAL (ML) SUBCUTANEOUS AT BEDTIME
Refills: 0 | Status: DISCONTINUED | OUTPATIENT
Start: 2024-10-30 | End: 2024-10-31

## 2024-10-30 RX ADMIN — Medication 4: at 12:04

## 2024-10-30 RX ADMIN — Medication 2 UNIT(S): at 21:17

## 2024-10-30 RX ADMIN — Medication 6: at 18:14

## 2024-10-30 RX ADMIN — Medication 50 MILLIGRAM(S): at 05:08

## 2024-10-30 RX ADMIN — Medication 2 UNIT(S): at 12:04

## 2024-10-30 RX ADMIN — LOSARTAN POTASSIUM 50 MILLIGRAM(S): 25 TABLET ORAL at 05:08

## 2024-10-30 RX ADMIN — Medication 2 UNIT(S): at 18:15

## 2024-10-30 RX ADMIN — Medication 20 MILLIGRAM(S): at 21:17

## 2024-10-30 RX ADMIN — SODIUM CHLORIDE 3 MILLILITER(S): 9 INJECTION, SOLUTION INTRAMUSCULAR; INTRAVENOUS; SUBCUTANEOUS at 13:19

## 2024-10-30 RX ADMIN — SODIUM CHLORIDE 3 MILLILITER(S): 9 INJECTION, SOLUTION INTRAMUSCULAR; INTRAVENOUS; SUBCUTANEOUS at 22:12

## 2024-10-30 RX ADMIN — Medication 60 MILLIGRAM(S): at 05:09

## 2024-10-30 RX ADMIN — Medication 2: at 09:40

## 2024-10-30 RX ADMIN — Medication 1 TABLET(S): at 12:05

## 2024-10-30 RX ADMIN — Medication 2 TABLET(S): at 21:17

## 2024-10-30 RX ADMIN — Medication 2 UNIT(S): at 09:40

## 2024-10-30 RX ADMIN — Medication 4: at 21:17

## 2024-10-30 RX ADMIN — PANTOPRAZOLE SODIUM 40 MILLIGRAM(S): 40 TABLET, DELAYED RELEASE ORAL at 05:09

## 2024-10-30 RX ADMIN — SODIUM CHLORIDE 3 MILLILITER(S): 9 INJECTION, SOLUTION INTRAMUSCULAR; INTRAVENOUS; SUBCUTANEOUS at 06:00

## 2024-10-30 NOTE — PROGRESS NOTE ADULT - SUBJECTIVE AND OBJECTIVE BOX
INTERVAL EVENTS:  Follow up pituitary macroadenoma. Pt denies acute pain, endorses good appetite.     MEDICATIONS  (STANDING):  atorvastatin 20 milliGRAM(s) Oral at bedtime  dextrose 5%. 1000 milliLiter(s) (100 mL/Hr) IV Continuous <Continuous>  dextrose 5%. 1000 milliLiter(s) (50 mL/Hr) IV Continuous <Continuous>  dextrose 50% Injectable 12.5 Gram(s) IV Push once  dextrose 50% Injectable 25 Gram(s) IV Push once  dextrose 50% Injectable 25 Gram(s) IV Push once  glucagon  Injectable 1 milliGRAM(s) IntraMuscular once  influenza   Vaccine 0.5 milliLiter(s) IntraMuscular once  insulin lispro (ADMELOG) corrective regimen sliding scale   SubCutaneous three times a day before meals  insulin lispro (ADMELOG) corrective regimen sliding scale   SubCutaneous at bedtime  insulin lispro Injectable (ADMELOG) 2 Unit(s) SubCutaneous three times a day before meals  losartan 50 milliGRAM(s) Oral daily  metoprolol succinate ER 50 milliGRAM(s) Oral two times a day  multivitamin 1 Tablet(s) Oral daily  NIFEdipine XL 60 milliGRAM(s) Oral daily  pantoprazole    Tablet 40 milliGRAM(s) Oral before breakfast  polyethylene glycol 3350 17 Gram(s) Oral daily  senna 2 Tablet(s) Oral at bedtime  sodium chloride 0.9% lock flush 3 milliLiter(s) IV Push every 8 hours    MEDICATIONS  (PRN):  acetaminophen     Tablet .. 650 milliGRAM(s) Oral every 6 hours PRN Moderate Pain (4 - 6)  dextrose Oral Gel 15 Gram(s) Oral once PRN Blood Glucose LESS THAN 70 milliGRAM(s)/deciliter  oxyCODONE    IR 5 milliGRAM(s) Oral every 6 hours PRN Severe Pain (7 - 10)    Allergies  No Known Allergies    Vital Signs Last 24 Hrs  T(C): 37.1 (30 Oct 2024 08:00), Max: 37.3 (29 Oct 2024 15:58)  T(F): 98.7 (30 Oct 2024 08:00), Max: 99.1 (29 Oct 2024 15:58)  HR: 62 (30 Oct 2024 08:00) (60 - 64)  BP: 108/68 (30 Oct 2024 08:00) (104/60 - 125/79)  BP(mean): --  RR: 18 (30 Oct 2024 08:00) (18 - 18)  SpO2: 94% (30 Oct 2024 08:00) (94% - 95%)    Parameters below as of 30 Oct 2024 08:00  Patient On (Oxygen Delivery Method): room air    PHYSICAL EXAM:  General: No apparent distress  Respiratory: Lungs clear bilaterally  Cardiac: +S1, S2, no m/r/g  GI: +BS, soft, non tender, non distended  Extremities: No peripheral edema  Neuro: A+O X3    LABS:                        11.3   4.63  )-----------( 159      ( 30 Oct 2024 05:00 )             33.6     10-30    139  |  101  |  19.9  ----------------------------<  144[H]  3.9   |  25.0  |  1.05    Ca    9.0      30 Oct 2024 05:00  Phos  4.4     10-30  Mg     2.0     10-30    TPro  6.9  /  Alb  3.8  /  TBili  0.9  /  DBili  x   /  AST  35  /  ALT  14  /  AlkPhos  62  10-29    Urinalysis Basic - ( 30 Oct 2024 05:00 )    Color: x / Appearance: x / SG: x / pH: x  Gluc: 144 mg/dL / Ketone: x  / Bili: x / Urobili: x   Blood: x / Protein: x / Nitrite: x   Leuk Esterase: x / RBC: x / WBC x   Sq Epi: x / Non Sq Epi: x / Bacteria: x    POCT Blood Glucose.: 173 mg/dL (10-30-24 @ 08:53)  POCT Blood Glucose.: 199 mg/dL (10-29-24 @ 21:51)  POCT Blood Glucose.: 202 mg/dL (10-29-24 @ 18:16)  POCT Blood Glucose.: 174 mg/dL (10-29-24 @ 12:20)    Thyroid Stimulating Hormone, Serum: 0.60 uIU/mL (10-29-24 @ 05:12)  Free Thyroxine, Serum: 1.0 ng/dL (10-29-24 @ 05:12)  Thyroid Stimulating Hormone, Serum: 0.38 uIU/mL (10-28-24 @ 00:47)   INTERVAL EVENTS:  Follow up pituitary macroadenoma. Pt denies acute pain, endorses good appetite.     MEDICATIONS  (STANDING):  atorvastatin 20 milliGRAM(s) Oral at bedtime  dextrose 5%. 1000 milliLiter(s) (100 mL/Hr) IV Continuous <Continuous>  dextrose 5%. 1000 milliLiter(s) (50 mL/Hr) IV Continuous <Continuous>  dextrose 50% Injectable 12.5 Gram(s) IV Push once  dextrose 50% Injectable 25 Gram(s) IV Push once  dextrose 50% Injectable 25 Gram(s) IV Push once  glucagon  Injectable 1 milliGRAM(s) IntraMuscular once  influenza   Vaccine 0.5 milliLiter(s) IntraMuscular once  insulin lispro (ADMELOG) corrective regimen sliding scale   SubCutaneous three times a day before meals  insulin lispro (ADMELOG) corrective regimen sliding scale   SubCutaneous at bedtime  insulin lispro Injectable (ADMELOG) 2 Unit(s) SubCutaneous three times a day before meals  losartan 50 milliGRAM(s) Oral daily  metoprolol succinate ER 50 milliGRAM(s) Oral two times a day  multivitamin 1 Tablet(s) Oral daily  NIFEdipine XL 60 milliGRAM(s) Oral daily  pantoprazole    Tablet 40 milliGRAM(s) Oral before breakfast  polyethylene glycol 3350 17 Gram(s) Oral daily  senna 2 Tablet(s) Oral at bedtime  sodium chloride 0.9% lock flush 3 milliLiter(s) IV Push every 8 hours    MEDICATIONS  (PRN):  acetaminophen     Tablet .. 650 milliGRAM(s) Oral every 6 hours PRN Moderate Pain (4 - 6)  dextrose Oral Gel 15 Gram(s) Oral once PRN Blood Glucose LESS THAN 70 milliGRAM(s)/deciliter  oxyCODONE    IR 5 milliGRAM(s) Oral every 6 hours PRN Severe Pain (7 - 10)    Allergies  No Known Allergies    Vital Signs Last 24 Hrs  T(C): 37.1 (30 Oct 2024 08:00), Max: 37.3 (29 Oct 2024 15:58)  T(F): 98.7 (30 Oct 2024 08:00), Max: 99.1 (29 Oct 2024 15:58)  HR: 62 (30 Oct 2024 08:00) (60 - 64)  BP: 108/68 (30 Oct 2024 08:00) (104/60 - 125/79)  BP(mean): --  RR: 18 (30 Oct 2024 08:00) (18 - 18)  SpO2: 94% (30 Oct 2024 08:00) (94% - 95%)    Parameters below as of 30 Oct 2024 08:00  Patient On (Oxygen Delivery Method): room air    PHYSICAL EXAM:  General: No apparent distress  Respiratory: Lungs clear bilaterally  Cardiac: +S1, S2, no m/r/g  GI: +BS, soft, non tender, non distended  Extremities: No peripheral edema  Neuro: A+O X3    LABS:                        11.3   4.63  )-----------( 159      ( 30 Oct 2024 05:00 )             33.6     10-30    139  |  101  |  19.9  ----------------------------<  144[H]  3.9   |  25.0  |  1.05    Ca    9.0      30 Oct 2024 05:00  Phos  4.4     10-30  Mg     2.0     10-30    TPro  6.9  /  Alb  3.8  /  TBili  0.9  /  DBili  x   /  AST  35  /  ALT  14  /  AlkPhos  62  10-29    POCT Blood Glucose.: 173 mg/dL (10-30-24 @ 08:53)  POCT Blood Glucose.: 199 mg/dL (10-29-24 @ 21:51)  POCT Blood Glucose.: 202 mg/dL (10-29-24 @ 18:16)  POCT Blood Glucose.: 174 mg/dL (10-29-24 @ 12:20)    Thyroid Stimulating Hormone, Serum: 0.60 uIU/mL (10-29-24 @ 05:12)  Free Thyroxine, Serum: 1.0 ng/dL (10-29-24 @ 05:12)  Thyroid Stimulating Hormone, Serum: 0.38 uIU/mL (10-28-24 @ 00:47)

## 2024-10-30 NOTE — PROGRESS NOTE ADULT - SUBJECTIVE AND OBJECTIVE BOX
[facility] Division of Hospital Medicine     Chief Complaint:  Pituitary Tumor     SUBJECTIVE / OVERNIGHT EVENTS:   Pt reports no complaints     Patient denies chest pain, SOB, abd pain, N/V, fever, chills, dysuria or any other complaints. All remainder ROS negative.     MEDICATIONS  (STANDING):   atorvastatin 20 milliGRAM(s) Oral at bedtime  dextrose 5%. 1000 milliLiter(s) (100 mL/Hr) IV Continuous <Continuous>  dextrose 5%. 1000 milliLiter(s) (50 mL/Hr) IV Continuous <Continuous>  dextrose 50% Injectable 12.5 Gram(s) IV Push once  dextrose 50% Injectable 25 Gram(s) IV Push once  dextrose 50% Injectable 25 Gram(s) IV Push once  glucagon  Injectable 1 milliGRAM(s) IntraMuscular once  influenza   Vaccine 0.5 milliLiter(s) IntraMuscular once  insulin glargine Injectable (LANTUS) 2 Unit(s) SubCutaneous at bedtime  insulin lispro (ADMELOG) corrective regimen sliding scale   SubCutaneous at bedtime  insulin lispro (ADMELOG) corrective regimen sliding scale   SubCutaneous three times a day before meals  insulin lispro Injectable (ADMELOG) 2 Unit(s) SubCutaneous three times a day before meals  losartan 50 milliGRAM(s) Oral daily  metoprolol succinate ER 50 milliGRAM(s) Oral two times a day  multivitamin 1 Tablet(s) Oral daily  NIFEdipine XL 60 milliGRAM(s) Oral daily  pantoprazole    Tablet 40 milliGRAM(s) Oral before breakfast  polyethylene glycol 3350 17 Gram(s) Oral daily  senna 2 Tablet(s) Oral at bedtime  sodium chloride 0.9% lock flush 3 milliLiter(s) IV Push every 8 hours    MEDICATIONS  (PRN):   acetaminophen     Tablet .. 650 milliGRAM(s) Oral every 6 hours PRN Moderate Pain (4 - 6)  dextrose Oral Gel 15 Gram(s) Oral once PRN Blood Glucose LESS THAN 70 milliGRAM(s)/deciliter  oxyCODONE    IR 5 milliGRAM(s) Oral every 6 hours PRN Severe Pain (7 - 10)        I&O's Summary     29 Oct 2024 07:01  -  30 Oct 2024 07:00  --------------------------------------------------------  IN: 350 mL / OUT: 300 mL / NET: 50 mL    30 Oct 2024 07:01  -  30 Oct 2024 12:23  --------------------------------------------------------  IN: 280 mL / OUT: 0 mL / NET: 280 mL        PHYSICAL EXAM:   Vital Signs Last 24 Hrs  T(C): 37.1 (30 Oct 2024 08:00), Max: 37.3 (29 Oct 2024 15:58)  T(F): 98.7 (30 Oct 2024 08:00), Max: 99.1 (29 Oct 2024 15:58)  HR: 62 (30 Oct 2024 08:00) (60 - 64)  BP: 108/68 (30 Oct 2024 08:00) (104/60 - 125/79)  BP(mean): --  RR: 18 (30 Oct 2024 08:00) (18 - 18)  SpO2: 94% (30 Oct 2024 08:00) (94% - 95%)    Parameters below as of 30 Oct 2024 08:00  Patient On (Oxygen Delivery Method): room air        CONSTITUTIONAL: Awake, alert and in no apparent distress  CARDIAC: Normal rate, regular rhythm.  Heart sounds S1, S2.    RESPIRATORY: Breath sounds clear and equal bilaterally.   ABDOMINAL: Nontender to palpation. No rebound/ guarding   EXTREMITIES: No edema, cyanosis or deformity   NEUROLOGICAL: Alert and oriented, no focal deficits         LABS:                        11.3   4.63  )-----------( 159      ( 30 Oct 2024 05:00 )             33.6     10-30    139  |  101  |  19.9  ----------------------------<  144[H]  3.9   |  25.0  |  1.05    Ca    9.0      30 Oct 2024 05:00  Phos  4.4     10-30  Mg     2.0     10-30    TPro  6.9  /  Alb  3.8  /  TBili  0.9  /  DBili  x   /  AST  35  /  ALT  14  /  AlkPhos  62  10-29          Urinalysis Basic - ( 30 Oct 2024 05:00 )    Color: x / Appearance: x / SG: x / pH: x  Gluc: 144 mg/dL / Ketone: x  / Bili: x / Urobili: x   Blood: x / Protein: x / Nitrite: x   Leuk Esterase: x / RBC: x / WBC x   Sq Epi: x / Non Sq Epi: x / Bacteria: x        Culture - Urine (collected 27 Oct 2024 16:49)  Source: Clean Catch Clean Catch (Midstream)  Final Report (30 Oct 2024 08:38):    10,000 - 49,000 CFU/mL Morganella morganii    <10,000 CFU/ml Normal Urogenital craig present  Organism: Morganella morganii (30 Oct 2024 08:38)  Organism: Morganella morganii (30 Oct 2024 08:38)      CAPILLARY BLOOD GLUCOSE      POCT Blood Glucose.: 223 mg/dL (30 Oct 2024 12:02)  POCT Blood Glucose.: 173 mg/dL (30 Oct 2024 08:53)  POCT Blood Glucose.: 199 mg/dL (29 Oct 2024 21:51)  POCT Blood Glucose.: 202 mg/dL (29 Oct 2024 18:16)

## 2024-10-30 NOTE — PROGRESS NOTE ADULT - ASSESSMENT
63M with PMH of DM, HTN, HLD, GERD, on ASA81 daily, known history of pituitary tumor diagnosed 8 years ago in [facility] with no follow up since then, presents with generalized weakness x 1 day and found to have large pituitary mass extensively   elevates and distorts the optic chiasm and forebrain, invades the sphenoid sinus to the right of midline and likely invades the right cavernous sinus.    1. Pituitary macroadenoma with optic chiasm compression  - Non functioning pituitary macroadenoma  - Hormonal workup normal  - Surgical plan as per neurosurgery    2. T2DM, a1c 7.1%- mild hyperglycemia  - Start lantus 2 units qhs  - Continue admelog 2 units TID  - Continue correction scale    3. HLD  - Continue statin    4. Abnormal EKG    - Cardiology workup s/p TTE    5. GERD  - Continue protonix   63M with PMH of DM, HTN, HLD, GERD, on ASA81 daily, known history of pituitary tumor diagnosed 8 years ago in [facility] with no follow up since then, presents with generalized weakness x 1 day and found to have large pituitary mass extensively   elevates and distorts the optic chiasm and forebrain, invades the sphenoid sinus to the right of midline and likely invades the right cavernous sinus.    1. Pituitary macroadenoma with optic chiasm compression  - Hormonal workup normal --> Non functioning pituitary macroadenoma  - Surgical plan as per neurosurgery    2. T2DM, a1c 7.1%- mild hyperglycemia  - Start lantus 2 units qhs  - Continue admelog 2 units TID  - Continue correction scale    3. HLD  - Continue statin    4. Abnormal EKG,  Cardiac CTA done: Minimal non-obstructive CAD   - cont BP meds, statin  - glycemic control    5. GERD  - Continue protonix

## 2024-10-30 NOTE — PROGRESS NOTE ADULT - SUBJECTIVE AND OBJECTIVE BOX
HPI: 63M with PMH of DM, HTN, HLD, GERD, on ASA81 daily, known history of pituitary tumor diagnosed 8 years ago in [facility] with no follow up since then, presents with generalized weakness x 1 day. Patient reports difficulty getting out of his chair with unsteady gait since this morning. Patient currently c/o mild headache and burning with urination. Denies any recent falls/trauma, vision changes, dizziness, nausea/vomiting, numbness, or tingling. 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 Pt also noted to have TWI on EKG, cards consulted and further w/u pending. Pt reportedly claims to be compliant on home cardiac medications. Pt admitted to neurosurgery for likely surgical resection of pituitary tumor with Dr. Harmon.    INTERVAL HPI/OVERNIGHT EVENTS: 63y Male seen lying comfortably in bed. Tolerating diet. Passing gas/BM. Voiding. Denies headache, weakness, numbness, n/v/d, fevers, chills, chest pain, SOB.     Vital Signs Last 24 Hrs  T(C): 37.1 (30 Oct 2024 08:00), Max: 37.3 (29 Oct 2024 15:58)  T(F): 98.7 (30 Oct 2024 08:00), Max: 99.1 (29 Oct 2024 15:58)  HR: 62 (30 Oct 2024 08:00) (60 - 64)  BP: 108/68 (30 Oct 2024 08:00) (104/60 - 125/79)  RR: 18 (30 Oct 2024 08:00) (18 - 18)  SpO2: 94% (30 Oct 2024 08:00) (94% - 95%)    Parameters below as of 30 Oct 2024 08:00  Patient On (Oxygen Delivery Method): room air    Physical Exam:      RADIOLOGY:  < from: MR Sella alone w/wo IV Cont (10.28.24 @ 07:26) >ACC: [id number] EXAM:  MR SELLA ONLY WAW IC   ORDERED BY: KELLY SEWELL /ACC: [id number] EXAM:  MR BRAIN WAW IC   ORDERED BY: JANET ROMERO -PROCEDURE DATE:  10/28/2024    INTERPRETATION:  MR of the pituitary/brain with and without gadolinium   contrast    CLINICAL INFORMATION:   brain tumor    mr w pituitary focus  XMR    TECHNIQUE:   Sagittal T1-weighted images, axial FLAIR images and axial   diffusion weighted images of the brain were obtained.  High-resolution   images were obtained through the pituitary including coronal T1-weighted   and T2-weighted imaging.   Coronal T1-weighted images obtained as a   dynamic acquisition preceding and during the rapid bolus administration   of gadolinium at multiple time points.  Directly following sagittal and   coronal postgadolinium images of the pituitary were obtained.   Axial   post gadolinium volumetric T1 weighted images the brain were obtained and   reconstructed in the sagittal and coronal planes.  CONTRAST:    Gadavist:     10 cc administered  ;  0 cc discarded    COMPARISON:   CT head and CT angiography preceding day    FINDINGS:    PITUITARY    Large pituitary tumor expands and distorts the sella. This lesion extends   upward obliterating the suprasellar cistern and considerably elevating   the optic chiasm. The anterior cerebral arteries are displaced right to   left, anterior to posterior and inferior to superior, without apparent   narrowing of the lesion extends within the right cavernous sinus lateral   to the apex of the internal carotid artery. At the left cavernous sinus   tumor remains medial to the internal carotid artery. The lesion extends   downward remodeling the sella or for an extensively invading the right   sphenoid sinus. Within the further lateral pterygoid recesses and   posterior superior and aspect of the sphenoid sinus T1 hyperintense   material suggests trapped secretions. Tumor appears to perforate the   sphenoid septum with right to left extension of tumor into the left   sphenoid sinus.The tumor is heterogeneous on the long TR images with   hyperintensity near its vertex but differentially greater enhancement,   suggesting cystic and vascular tumor. Overall the lesion measures   approximately 4.8 cm in height x 3.3 cm AP x 8 2.9 cmtransverse in   maximum dimensions. The elevated ventral forebrain is distorted and   demonstrates no signal intensity change or evidence of invasion.    BRAIN    BRAIN PARENCHYMA:   The brain demonstrates patchy and confluent lesions   within the deep cerebral hemispheric white matter typical for ischemic   white matter disease. These lesions are hyperintense on the long TR   images, hypointense on T1-weighted images are most confluent, otherwise   inconspicuous. These lesions most extensively involve the posterior   centrum semiovale, periatrial and posterior periventricular white matter.   Additional subcortical and deeper lesions are present. At least mild   ventral pontine involvement is present. No diffusion restriction is found   in the brain.  No acute cerebral cortical infarct is found.   No   intracranial hemorrhage is recognized.  No mass effect is found in the   brain. No abnormal enhancement is noted intrinsic to the brain.    CSF SPACES:   The ventricles, sulci and basal cisterns appear mildly   dilated reflecting diffuse brain volume loss.    VESSELS:   The principal dural sinuses enhance indicating their patency.    The vertebral and internal carotid arteries demonstrate expected flow   voids indicating their patency.    HEAD AND NECK STRUCTURES:   The orbits are unremarkable.  Paranasal   sinuses also demonstrate lobulated mucosal disease within the maxillary   sinuses, ethmoid air cells and frontal sinuses at each nasofrontal   recess.  The nasal cavity appears intact.   The nasopharynx is symmetric.    The temporal bones appear clear of disease.  The calvarium appears   unremarkable.      IMPRESSION:    1. 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    2. BRAIN:   Ischemic white matter disease greater than typical for age.   Diffuse brain volume loss typical for age.    --- End ofReport ---    JENNIFER GONZALEZ MD; Attending Radiologist  This document has been electronically signed. Oct 28 2024  7:41AM    < end of copied text >      < from: CT Head No Cont (10.27.24 @ 17:03) >ACC: [id number] EXAM:  CT BRAIN   ORDERED BY: JANET ROMERO /ACC: [id number] EXAM:  CT ANGIO NECK (W)AW IC   ORDERED BY: JANET ROMERO /ACC: [id number] EXAM:  CT ANGIO BRAIN (W)AW IC   ORDERED BY: JANET ROMERO -PROCEDURE DATE:  10/27/2024    INTERPRETATION:  EXAMINATION: CT ANGIO BRAIN, CT ANGIO NECK, CT HEAD    CLINICAL INDICATION: unsteady gait  TECHNIQUE: CT images of the head were obtained without contrast. Thin   section CT axial images of the head and neck vasculature were obtained   following IV injection of Omnipaque 350, 90 cc administered. Multiple 3D   and MIP reformats were created on a separate workstation and submitted   for review. Dose reduction techniques were utilized including but not   limited to automated exposure control(AEC), iterative reconstruction   technique, and/or mA and/or kV dose adjustments based on patient size.  COMPARISON: None.    FINDINGS:    There is a large mass in the sella and suprasellar cistern with solid and   cystic components. The lesion measures approximately 2.7 x 2.7 x 3.9 cm.   There is associated chiasmatic encroachment and invasion of both   cavernous sinuses.    The tumor is intimately associated with the [place], coming into   contact with both carotid arteries, the rightPCOM, the left A1 segment,   and both ACAs, which are deviated to the left. 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 Above the   posterior clinoid process, the tumor is 3 mm from the basilar artery.    Thereis depression and erosion of the floor of the sella, with suspected   invasion of the sphenoid sinuses. The right sphenoid sinus is completely   opacified, and the right sphenoethmoidal recess is expanded.    The appearance is nonspecific, but suspicious for a pituitary adenoma   until proven otherwise. Meningioma is also in the differential diagnosis.   Neurosurgical consultation is recommended. This could be characterized   more definitively with a dedicated pituitary MRI.    Moderate generalized cerebral volume loss, with distention of the sulci   and concomitant ex-vacuo ventricular dilatation. Moderate nonspecific low   attenuation in the periventricular and subcortical white matter, likely   due to small vessel disease.    No acute intracranial hemorrhage. No midline shift or herniation. No CT   evidence of acute territorial infarction, although MRI with DWI would be   more sensitive.    A large retention cyst is noted in the right maxillary sinus. There are   no air-fluid levels. The mastoids are clear. Limited views of the orbits   and visualized soft tissues of the neck, face, scalp, skull base, and   calvarium are otherwise unremarkable.    Calcified plaque without significant narrowing at the great vessel   origins and bothcarotid bifurcations.    On the right, the ICA, ECA, CCA, and brachiocephalic artery are patent.   On the left, the ICA, ECA, CCA are patent. The vertebrals and subclavian   arteries are patent. No arterial dissection or ulcerated plaque.    Calcified plaque without significant narrowing at both carotid siphons.    No intracranial aneurysms or large vessel occlusions. No large feeding   arteries or draining veins. The ACAs, MCAs, PCAs, and carotid siphons are   otherwise negative. The basilar artery and V4 vertebral segments are   otherwise negative. Abnormalities of  vessels and distal   intracranial branches are beyond the resolution of this technique, and   catheter angiography would be more sensitive.    The dural venous sinusesare grossly patent, although this examination   wasn't optimized to evaluate the cerebral veins. There is moderate   compression of both internal jugular veins by the styloid processes at   the skull base.    There is a vascular lesion dorsal to the right lobe of the thyroid gland,   measuring 12 x 11 x 18 mm. This could represent an exophytic thyroid   nodule or a parathyroid adenoma.    Degenerative changes in the cervical spine, with osteophytic left C6-7   foraminal encroachment. No gross evidence of an acute fracture, although   this examination wasn't optimized to evaluate the spine.    IMPRESSION:    1.  Large pituitary tumor with chiasmatic encroachment, consider   pituitary MRI.  2.  Invasion of the cavernous sinuses and sphenoid sinuses.  3.  Incidental vascular lesion dorsal to the right thyroid.      Patient Name: SAI HENDRIX  MRN: [id number], : 60    --- End of Report ---    ASHLEY LOWRY MD; Attending Radiologist  This document has been electronically signed. Oct 27 2024  6:02PM    < end of copied text >    LABS:                        11.3   4.63  )-----------( 159      ( 30 Oct 2024 05:00 )             33.6       10-30    139  |  101  |  19.9  ----------------------------<  144[H]  3.9   |  25.0  |  1.05    Ca    9.0      30 Oct 2024 05:00  Phos  4.4     10-30  Mg     2.0     10-30    TPro  6.9  /  Alb  3.8  /  TBili  0.9  /  DBili  x   /  AST  35  /  ALT  14  /  AlkPhos  62  10-29        Urinalysis Basic - ( 30 Oct 2024 05:00 )  Color: x / Appearance: x / SG: x / pH: x  Gluc: 144 mg/dL / Ketone: x  / Bili: x / Urobili: x   Blood: x / Protein: x / Nitrite: x   Leuk Esterase: x / RBC: x / WBC x   Sq Epi: x / Non Sq Epi: x / Bacteria: x      CAPILLARY BLOOD GLUCOSE  POCT Blood Glucose.: 223 mg/dL (30 Oct 2024 12:02)             HPI: 63M with PMH of DM, HTN, HLD, GERD, on ASA81 daily, known history of pituitary tumor diagnosed 8 years ago in [place] with no follow up since then, presents with generalized weakness x 1 day. Patient reports difficulty getting out of his chair with unsteady gait since this morning. Patient currently c/o mild headache and burning with urination. Denies any recent falls/trauma, vision changes, dizziness, nausea/vomiting, numbness, or tingling. 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 Pt also noted to have TWI on EKG, cards consulted and further w/u pending. Pt reportedly claims to be compliant on home cardiac medications. Pt admitted to neurosurgery for likely surgical resection of pituitary tumor with Dr. Harmon.    INTERVAL HPI/OVERNIGHT EVENTS: 63y Male seen lying comfortably in bed. Tolerating diet. Passing gas/BM. Voiding. Denies headache, weakness, numbness, n/v/d, fevers, chills, chest pain, SOB.     Vital Signs Last 24 Hrs  T(C): 37.1 (30 Oct 2024 08:00), Max: 37.3 (29 Oct 2024 15:58)  T(F): 98.7 (30 Oct 2024 08:00), Max: 99.1 (29 Oct 2024 15:58)  HR: 62 (30 Oct 2024 08:00) (60 - 64)  BP: 108/68 (30 Oct 2024 08:00) (104/60 - 125/79)  RR: 18 (30 Oct 2024 08:00) (18 - 18)  SpO2: 94% (30 Oct 2024 08:00) (94% - 95%)    Parameters below as of 30 Oct 2024 08:00  Patient On (Oxygen Delivery Method): room air    Physical Exam:  AMARIS COMA SCORE: E- V- M- = 15  Cardio: NSR on monitor  Pulm: respirations unlabored on RA  MENTAL STATUS: AAO x3; Awake; Opens eyes spontaneously; Appropriately conversant without aphasia; following simple commands  CRANIAL NERVES: PERRL. EOMI without nystagmus. Face symmetric w/ normal eye closure and smile, tongue midline. Hearing grossly intact. Speech clear. Head turning and shoulder shrug intact. Visual fields intact.   MOTOR: strength 5/5 b/l upper and lower extremities      RADIOLOGY:  < from: MR Sella alone w/wo IV Cont (10.28.24 @ 07:26) >ACC: [id number] EXAM:  MR SELLA ONLY WAW IC   ORDERED BY: KELLY SEWELL /ACC: [id number] EXAM:  MR BRAIN WAW IC   ORDERED BY: JANET ROMERO -PROCEDURE DATE:  10/28/2024    INTERPRETATION:  MR of the pituitary/brain with and without gadolinium   contrast    CLINICAL INFORMATION:   brain tumor    mr w pituitary focus  XMR    TECHNIQUE:   Sagittal T1-weighted images, axial FLAIR images and axial   diffusion weighted images of the brain were obtained.  High-resolution   images were obtained through the pituitary including coronal T1-weighted   and T2-weighted imaging.   Coronal T1-weighted images obtained as a   dynamic acquisition preceding and during the rapid bolus administration   of gadolinium at multiple time points.  Directly following sagittal and   coronal postgadolinium images of the pituitary were obtained.   Axial   post gadolinium volumetric T1 weighted images the brain were obtained and   reconstructed in the sagittal and coronal planes.  CONTRAST:    Gadavist:     10 cc administered  ;  0 cc discarded    COMPARISON:   CT head and CT angiography preceding day    FINDINGS:    PITUITARY    Large pituitary tumor expands and distorts the sella. This lesion extends   upward obliterating the suprasellar cistern and considerably elevating   the optic chiasm. The anterior cerebral arteries are displaced right to   left, anterior to posterior and inferior to superior, without apparent   narrowing of the lesion extends within the right cavernous sinus lateral   to the apex of the internal carotid artery. At the left cavernous sinus   tumor remains medial to the internal carotid artery. The lesion extends   downward remodeling the sella or for an extensively invading the right   sphenoid sinus. Within the further lateral pterygoid recesses and   posterior superior and aspect of the sphenoid sinus T1 hyperintense   material suggests trapped secretions. Tumor appears to perforate the   sphenoid septum with right to left extension of tumor into the left   sphenoid sinus.The tumor is heterogeneous on the long TR images with   hyperintensity near its vertex but differentially greater enhancement,   suggesting cystic and vascular tumor. Overall the lesion measures   approximately 4.8 cm in height x 3.3 cm AP x 8 2.9 cmtransverse in   maximum dimensions. The elevated ventral forebrain is distorted and   demonstrates no signal intensity change or evidence of invasion.    BRAIN    BRAIN PARENCHYMA:   The brain demonstrates patchy and confluent lesions   within the deep cerebral hemispheric white matter typical for ischemic   white matter disease. These lesions are hyperintense on the long TR   images, hypointense on T1-weighted images are most confluent, otherwise   inconspicuous. These lesions most extensively involve the posterior   centrum semiovale, periatrial and posterior periventricular white matter.   Additional subcortical and deeper lesions are present. At least mild   ventral pontine involvement is present. No diffusion restriction is found   in the brain.  No acute cerebral cortical infarct is found.   No   intracranial hemorrhage is recognized.  No mass effect is found in the   brain. No abnormal enhancement is noted intrinsic to the brain.    CSF SPACES:   The ventricles, sulci and basal cisterns appear mildly   dilated reflecting diffuse brain volume loss.    VESSELS:   The principal dural sinuses enhance indicating their patency.    The vertebral and internal carotid arteries demonstrate expected flow   voids indicating their patency.    HEAD AND NECK STRUCTURES:   The orbits are unremarkable.  Paranasal   sinuses also demonstrate lobulated mucosal disease within the maxillary   sinuses, ethmoid air cells and frontal sinuses at each nasofrontal   recess.  The nasal cavity appears intact.   The nasopharynx is symmetric.    The temporal bones appear clear of disease.  The calvarium appears   unremarkable.      IMPRESSION:    1. 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    2. BRAIN:   Ischemic white matter disease greater than typical for age.   Diffuse brain volume loss typical for age.    --- End ofReport ---    JENNIFER GONZALEZ MD; Attending Radiologist  This document has been electronically signed. Oct 28 2024  7:41AM    < end of copied text >      < from: CT Head No Cont (10. @ 17:03) >ACC: [id number] EXAM:  CT BRAIN   ORDERED BY: JANET ROMERO /ACC: [id number] EXAM:  CT ANGIO NECK (W)AW IC   ORDERED BY: JANET ROMERO /ACC: [id number] EXAM:  CT ANGIO BRAIN (W)AW IC   ORDERED BY: JANET ROMERO -PROCEDURE DATE:  10/27/2024    INTERPRETATION:  EXAMINATION: CT ANGIO BRAIN, CT ANGIO NECK, CT HEAD    CLINICAL INDICATION: unsteady gait  TECHNIQUE: CT images of the head were obtained without contrast. Thin   section CT axial images of the head and neck vasculature were obtained   following IV injection of Omnipaque 350, 90 cc administered. Multiple 3D   and MIP reformats were created on a separate workstation and submitted   for review. Dose reduction techniques were utilized including but not   limited to automated exposure control(AEC), iterative reconstruction   technique, and/or mA and/or kV dose adjustments based on patient size.  COMPARISON: None.    FINDINGS:    There is a large mass in the sella and suprasellar cistern with solid and   cystic components. The lesion measures approximately 2.7 x 2.7 x 3.9 cm.   There is associated chiasmatic encroachment and invasion of both   cavernous sinuses.    The tumor is intimately associated with the [place], coming into   contact with both carotid arteries, the rightPCOM, the left A1 segment,   and both ACAs, which are deviated to the left. 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 Above the   posterior clinoid process, the tumor is 3 mm from the basilar artery.    Thereis depression and erosion of the floor of the sella, with suspected   invasion of the sphenoid sinuses. The right sphenoid sinus is completely   opacified, and the right sphenoethmoidal recess is expanded.    The appearance is nonspecific, but suspicious for a pituitary adenoma   until proven otherwise. Meningioma is also in the differential diagnosis.   Neurosurgical consultation is recommended. This could be characterized   more definitively with a dedicated pituitary MRI.    Moderate generalized cerebral volume loss, with distention of the sulci   and concomitant ex-vacuo ventricular dilatation. Moderate nonspecific low   attenuation in the periventricular and subcortical white matter, likely   due to small vessel disease.    No acute intracranial hemorrhage. No midline shift or herniation. No CT   evidence of acute territorial infarction, although MRI with DWI would be   more sensitive.    A large retention cyst is noted in the right maxillary sinus. There are   no air-fluid levels. The mastoids are clear. Limited views of the orbits   and visualized soft tissues of the neck, face, scalp, skull base, and   calvarium are otherwise unremarkable.    Calcified plaque without significant narrowing at the great vessel   origins and bothcarotid bifurcations.    On the right, the ICA, ECA, CCA, and brachiocephalic artery are patent.   On the left, the ICA, ECA, CCA are patent. The vertebrals and subclavian   arteries are patent. No arterial dissection or ulcerated plaque.    Calcified plaque without significant narrowing at both carotid siphons.    No intracranial aneurysms or large vessel occlusions. No large feeding   arteries or draining veins. The ACAs, MCAs, PCAs, and carotid siphons are   otherwise negative. The basilar artery and V4 vertebral segments are   otherwise negative. Abnormalities of  vessels and distal   intracranial branches are beyond the resolution of this technique, and   catheter angiography would be more sensitive.    The dural venous sinusesare grossly patent, although this examination   wasn't optimized to evaluate the cerebral veins. There is moderate   compression of both internal jugular veins by the styloid processes at   the skull base.    There is a vascular lesion dorsal to the right lobe of the thyroid gland,   measuring 12 x 11 x 18 mm. This could represent an exophytic thyroid   nodule or a parathyroid adenoma.    Degenerative changes in the cervical spine, with osteophytic left C6-7   foraminal encroachment. No gross evidence of an acute fracture, although   this examination wasn't optimized to evaluate the spine.    IMPRESSION:    1.  Large pituitary tumor with chiasmatic encroachment, consider   pituitary MRI.  2.  Invasion of the cavernous sinuses and sphenoid sinuses.  3.  Incidental vascular lesion dorsal to the right thyroid.      Patient Name: SAI HENDRIX  MRN: [id number], : 60    --- End of Report ---    ASHLEY LOWRY MD; Attending Radiologist  This document has been electronically signed. Oct 27 2024  6:02PM    < end of copied text >    LABS:                        11.3   4.63  )-----------( 159      ( 30 Oct 2024 05:00 )             33.6       10-30    139  |  101  |  19.9  ----------------------------<  144[H]  3.9   |  25.0  |  1.05    Ca    9.0      30 Oct 2024 05:00  Phos  4.4     10-30  Mg     2.0     10-30    TPro  6.9  /  Alb  3.8  /  TBili  0.9  /  DBili  x   /  AST  35  /  ALT  14  /  AlkPhos  62  10-29        Urinalysis Basic - ( 30 Oct 2024 05:00 )  Color: x / Appearance: x / SG: x / pH: x  Gluc: 144 mg/dL / Ketone: x  / Bili: x / Urobili: x   Blood: x / Protein: x / Nitrite: x   Leuk Esterase: x / RBC: x / WBC x   Sq Epi: x / Non Sq Epi: x / Bacteria: x      CAPILLARY BLOOD GLUCOSE  POCT Blood Glucose.: 223 mg/dL (30 Oct 2024 12:02)

## 2024-10-30 NOTE — PROGRESS NOTE ADULT - ASSESSMENT
Assessment: 63M with PMH of DM, HTN, HLD, GERD, on ASA81 daily, known history of pituitary tumor diagnosed 8 years ago in [place] with no follow up since then, presents with generalized weakness x 1 day. Neurosurgery admitting for 2.7 x 2.7 x 3.9 cm pituitary mass with chiasmatic encroachment.    Plan:  - Q4 neuro checks  - All imaging reviewed: CTH and MRI Brain shows 2.7 x 2.7 x 3.9 cm pituitary mass with chiasmatic encroachment  - Endocrine recs appreciated; AM labs pending  - Cardiology consult, w/u, recs and clearance appreciated  - SBP goal <160  - Resume home HTN/HLD meds  - Medicine consult and clearance pending  - Hormonal work-up WNL per endocrinology   - Hold ASA  - Bowel Regimen: Senna and Miralax  - DVT ppx: SCDs only    Patient and plan to be discussed with Dr. Harmon.

## 2024-10-30 NOTE — PROGRESS NOTE ADULT - ASSESSMENT
63 M with PMH of DM, HTN, HLD, GERD, on ASA81 daily, known history of pituitary tumor diagnosed 8 years ago in [facility] with no follow up since then, presents with generalized weakness x 1 day. Patient reports difficulty getting out of his chair with unsteady gait since this morning. Patient currently c/o mild headache and burning with urination. Denies any recent falls/trauma, vision changes, dizziness, nausea/vomiting, numbness, or tingling. 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 Pt also noted to have TWI on EKG, cards consulted and further w/u pending. Pt reportedly claims to be compliant on home cardiac medications. Pt admitted to neurosurgery for likely surgical resection of pituitary tumor with Dr. Harmon.     Pituitary tumor   - MRI brain reviewed: Large pituitary tumor, likely adenoma, extensively elevates and distorts the optic chiasm and forebrain, invades the sphenoid sinus to the right of midline and likely invades the right cavernous sinus.  - Neuro checks   - NSG plan for surgical resection this admission, likely next week   - Neuro ophtho eval arranged for this week per NSG   - SBP goal <160  - Hormonal evaluation per Endocrine, in progress   - Pt is medically optimized for Surgery     Abnormal EKG  - TTE reviewed  - Cardiac CTA done: Minimal non-obstructive CAD  - Cardio on board     DM   - A1C   - c/w Insulin and ISS  - BG monitoring     HTN  HLD   - c/w Toprol XL, Losartan, Nifedipine  - c/w statin

## 2024-10-31 ENCOUNTER — OFFICE (OUTPATIENT)
Dept: URBAN - METROPOLITAN AREA CLINIC 115 | Facility: CLINIC | Age: 64
Setting detail: OPHTHALMOLOGY
End: 2024-10-31
Payer: COMMERCIAL

## 2024-10-31 DIAGNOSIS — H25.13: ICD-10-CM

## 2024-10-31 DIAGNOSIS — D35.2: ICD-10-CM

## 2024-10-31 PROBLEM — E11.9 TYPE 2 DIABETES MELLITUS WITHOUT COMPLICATIONS: Chronic | Status: ACTIVE | Noted: 2024-10-27

## 2024-10-31 PROBLEM — E78.5 HYPERLIPIDEMIA, UNSPECIFIED: Chronic | Status: ACTIVE | Noted: 2024-10-27

## 2024-10-31 PROBLEM — Z87.898 PERSONAL HISTORY OF OTHER SPECIFIED CONDITIONS: Chronic | Status: ACTIVE | Noted: 2024-10-27

## 2024-10-31 PROBLEM — K21.9 GASTRO-ESOPHAGEAL REFLUX DISEASE WITHOUT ESOPHAGITIS: Chronic | Status: ACTIVE | Noted: 2024-10-27

## 2024-10-31 PROBLEM — I10 ESSENTIAL (PRIMARY) HYPERTENSION: Chronic | Status: ACTIVE | Noted: 2024-10-27

## 2024-10-31 PROBLEM — E11.9 DM TYPE 2: Status: ACTIVE | Noted: 2024-10-31

## 2024-10-31 LAB
GLUCOSE BLDC GLUCOMTR-MCNC: 134 MG/DL — HIGH (ref 70–99)
GLUCOSE BLDC GLUCOMTR-MCNC: 189 MG/DL — HIGH (ref 70–99)
GLUCOSE BLDC GLUCOMTR-MCNC: 208 MG/DL — HIGH (ref 70–99)
GLUCOSE BLDC GLUCOMTR-MCNC: 214 MG/DL — HIGH (ref 70–99)

## 2024-10-31 PROCEDURE — 99232 SBSQ HOSP IP/OBS MODERATE 35: CPT

## 2024-10-31 PROCEDURE — 92014 COMPRE OPH EXAM EST PT 1/>: CPT | Performed by: OPHTHALMOLOGY

## 2024-10-31 PROCEDURE — 92133 CPTRZD OPH DX IMG PST SGM ON: CPT | Performed by: OPHTHALMOLOGY

## 2024-10-31 RX ORDER — INSULIN GLARGINE,HUM.REC.ANLOG 100/ML
4 VIAL (ML) SUBCUTANEOUS AT BEDTIME
Refills: 0 | Status: DISCONTINUED | OUTPATIENT
Start: 2024-10-31 | End: 2024-11-03

## 2024-10-31 RX ORDER — INSULIN LISPRO 100/ML
4 VIAL (ML) SUBCUTANEOUS
Refills: 0 | Status: DISCONTINUED | OUTPATIENT
Start: 2024-10-31 | End: 2024-11-03

## 2024-10-31 RX ADMIN — SODIUM CHLORIDE 3 MILLILITER(S): 9 INJECTION, SOLUTION INTRAMUSCULAR; INTRAVENOUS; SUBCUTANEOUS at 12:45

## 2024-10-31 RX ADMIN — Medication 50 MILLIGRAM(S): at 16:54

## 2024-10-31 RX ADMIN — Medication 20 MILLIGRAM(S): at 21:31

## 2024-10-31 RX ADMIN — POLYETHYLENE GLYCOL 3350 17 GRAM(S): 17 POWDER, FOR SOLUTION ORAL at 16:53

## 2024-10-31 RX ADMIN — Medication 4: at 16:56

## 2024-10-31 RX ADMIN — Medication 4 UNIT(S): at 16:57

## 2024-10-31 RX ADMIN — Medication 50 MILLIGRAM(S): at 05:14

## 2024-10-31 RX ADMIN — Medication 2 TABLET(S): at 21:31

## 2024-10-31 RX ADMIN — Medication 4 UNIT(S): at 21:38

## 2024-10-31 RX ADMIN — PANTOPRAZOLE SODIUM 40 MILLIGRAM(S): 40 TABLET, DELAYED RELEASE ORAL at 05:14

## 2024-10-31 RX ADMIN — Medication 1 TABLET(S): at 16:53

## 2024-10-31 RX ADMIN — SODIUM CHLORIDE 3 MILLILITER(S): 9 INJECTION, SOLUTION INTRAMUSCULAR; INTRAVENOUS; SUBCUTANEOUS at 06:41

## 2024-10-31 RX ADMIN — LOSARTAN POTASSIUM 50 MILLIGRAM(S): 25 TABLET ORAL at 05:15

## 2024-10-31 RX ADMIN — Medication 60 MILLIGRAM(S): at 05:14

## 2024-10-31 ASSESSMENT — TONOMETRY
OD_IOP_MMHG: 14
OS_IOP_MMHG: 15

## 2024-10-31 ASSESSMENT — REFRACTION_MANIFEST
OD_CYLINDER: -1.25
OS_CYLINDER: -1.50
OD_SPHERE: -0.75
OS_AXIS: 115
OS_VA1: 20/40
OD_AXIS: 50
OD_VA1: 20/80
OS_SPHERE: PL

## 2024-10-31 ASSESSMENT — CONFRONTATIONAL VISUAL FIELD TEST (CVF)
OD_FINDINGS: FULL
OS_FINDINGS: FULL

## 2024-10-31 ASSESSMENT — VISUAL ACUITY
OD_BCVA: 20/50-2
OS_BCVA: 20/100-

## 2024-10-31 NOTE — PROGRESS NOTE ADULT - SUBJECTIVE AND OBJECTIVE BOX
[facility] Division of Hospital Medicine    Chief Complaint:  Pituitary Tumor     SUBJECTIVE / OVERNIGHT EVENTS:   Pt reports no complaints   Has neuro opthalmology evaluation set up this PM     Patient denies chest pain, SOB, abd pain, N/V, fever, chills, dysuria or any other complaints. All remainder ROS negative.     MEDICATIONS  (STANDING):   atorvastatin 20 milliGRAM(s) Oral at bedtime  dextrose 5%. 1000 milliLiter(s) (100 mL/Hr) IV Continuous <Continuous>  dextrose 5%. 1000 milliLiter(s) (50 mL/Hr) IV Continuous <Continuous>  dextrose 50% Injectable 12.5 Gram(s) IV Push once  dextrose 50% Injectable 25 Gram(s) IV Push once  dextrose 50% Injectable 25 Gram(s) IV Push once  glucagon  Injectable 1 milliGRAM(s) IntraMuscular once  influenza   Vaccine 0.5 milliLiter(s) IntraMuscular once  insulin glargine Injectable (LANTUS) 4 Unit(s) SubCutaneous at bedtime  insulin lispro (ADMELOG) corrective regimen sliding scale   SubCutaneous three times a day before meals  insulin lispro (ADMELOG) corrective regimen sliding scale   SubCutaneous at bedtime  insulin lispro Injectable (ADMELOG) 4 Unit(s) SubCutaneous three times a day before meals  losartan 50 milliGRAM(s) Oral daily  metoprolol succinate ER 50 milliGRAM(s) Oral two times a day  multivitamin 1 Tablet(s) Oral daily  NIFEdipine XL 60 milliGRAM(s) Oral daily  pantoprazole    Tablet 40 milliGRAM(s) Oral before breakfast  polyethylene glycol 3350 17 Gram(s) Oral daily  senna 2 Tablet(s) Oral at bedtime  sodium chloride 0.9% lock flush 3 milliLiter(s) IV Push every 8 hours    MEDICATIONS  (PRN):   acetaminophen     Tablet .. 650 milliGRAM(s) Oral every 6 hours PRN Moderate Pain (4 - 6)  dextrose Oral Gel 15 Gram(s) Oral once PRN Blood Glucose LESS THAN 70 milliGRAM(s)/deciliter  oxyCODONE    IR 5 milliGRAM(s) Oral every 6 hours PRN Severe Pain (7 - 10)        I&O's Summary     30 Oct 2024 07:01  -  31 Oct 2024 07:00  --------------------------------------------------------  IN: 580 mL / OUT: 940 mL / NET: -360 mL        PHYSICAL EXAM:  Vital Signs Last 24 Hrs  T(C): 36.6 (31 Oct 2024 13:17), Max: 37.2 (31 Oct 2024 00:02)  T(F): 97.8 (31 Oct 2024 13:17), Max: 99 (31 Oct 2024 00:02)  HR: 58 (31 Oct 2024 13:17) (54 - 71)  BP: 123/80 (31 Oct 2024 13:17) (100/63 - 135/79)  BP(mean): --  RR: 18 (31 Oct 2024 13:17) (16 - 18)  SpO2: 99% (31 Oct 2024 13:17) (95% - 99%)    Parameters below as of 31 Oct 2024 13:17  Patient On (Oxygen Delivery Method): room air        CONSTITUTIONAL: Awake, alert and in no apparent distress  CARDIAC: Normal rate, regular rhythm.  Heart sounds S1, S2.    RESPIRATORY: Breath sounds clear and equal bilaterally.   ABDOMINAL: Nontender to palpation. No rebound/ guarding   EXTREMITIES: No edema, cyanosis or deformity   NEUROLOGICAL: Alert and oriented, no focal deficits       LABS:                        11.3   4.63  )-----------( 159      ( 30 Oct 2024 05:00 )             33.6     10-30    139  |  101  |  19.9  ----------------------------<  144[H]  3.9   |  25.0  |  1.05    Ca    9.0      30 Oct 2024 05:00  Phos  4.4     10-30  Mg     2.0     10-30            Urinalysis Basic - ( 30 Oct 2024 05:00 )    Color: x / Appearance: x / SG: x / pH: x  Gluc: 144 mg/dL / Ketone: x  / Bili: x / Urobili: x   Blood: x / Protein: x / Nitrite: x   Leuk Esterase: x / RBC: x / WBC x   Sq Epi: x / Non Sq Epi: x / Bacteria: x        CAPILLARY BLOOD GLUCOSE      POCT Blood Glucose.: 214 mg/dL (31 Oct 2024 13:03)  POCT Blood Glucose.: 134 mg/dL (31 Oct 2024 08:58)  POCT Blood Glucose.: 304 mg/dL (30 Oct 2024 20:38)  POCT Blood Glucose.: 276 mg/dL (30 Oct 2024 18:05)

## 2024-10-31 NOTE — PROGRESS NOTE ADULT - ASSESSMENT
63M with PMH of DM, HTN, HLD, GERD, on ASA81 daily, known history of pituitary tumor diagnosed 8 years ago in [facility] with no follow up since then, presents with generalized weakness x 1 day and found to have large pituitary mass extensively   elevates and distorts the optic chiasm and forebrain, invades the sphenoid sinus to the right of midline and likely invades the right cavernous sinus.    1. Pituitary macroadenoma with optic chiasm compression  - Hormonal workup normal --> non functioning pituitary macroadenoma  - Surgical plan as per neurosurgery    2. T2DM, a1c 7.1%- mild hyperglycemia  - Increase premeal admelog to 4 units TID  - Increase lantus to 4 units qhs  - Continue correction scale    3. HLD  - Continue statin    4. Abnormal EKG,  Cardiac CTA done: Minimal non-obstructive CAD  - Cont BP meds, statin  - Gycemic control    5. GERD  - Continue protonix

## 2024-10-31 NOTE — PROGRESS NOTE ADULT - SUBJECTIVE AND OBJECTIVE BOX
INTERVAL EVENTS:  Follow up diabetes management. Glucoses elevated yesterday, improved this morning to 134. C/o bilateral thigh discomfort.     MEDICATIONS  (STANDING):  atorvastatin 20 milliGRAM(s) Oral at bedtime  dextrose 5%. 1000 milliLiter(s) (100 mL/Hr) IV Continuous <Continuous>  dextrose 5%. 1000 milliLiter(s) (50 mL/Hr) IV Continuous <Continuous>  dextrose 50% Injectable 25 Gram(s) IV Push once  dextrose 50% Injectable 25 Gram(s) IV Push once  dextrose 50% Injectable 12.5 Gram(s) IV Push once  glucagon  Injectable 1 milliGRAM(s) IntraMuscular once  influenza   Vaccine 0.5 milliLiter(s) IntraMuscular once  insulin glargine Injectable (LANTUS) 2 Unit(s) SubCutaneous at bedtime  insulin lispro (ADMELOG) corrective regimen sliding scale   SubCutaneous three times a day before meals  insulin lispro (ADMELOG) corrective regimen sliding scale   SubCutaneous at bedtime  insulin lispro Injectable (ADMELOG) 2 Unit(s) SubCutaneous three times a day before meals  losartan 50 milliGRAM(s) Oral daily  metoprolol succinate ER 50 milliGRAM(s) Oral two times a day  multivitamin 1 Tablet(s) Oral daily  NIFEdipine XL 60 milliGRAM(s) Oral daily  pantoprazole    Tablet 40 milliGRAM(s) Oral before breakfast  polyethylene glycol 3350 17 Gram(s) Oral daily  senna 2 Tablet(s) Oral at bedtime  sodium chloride 0.9% lock flush 3 milliLiter(s) IV Push every 8 hours    MEDICATIONS  (PRN):  acetaminophen     Tablet .. 650 milliGRAM(s) Oral every 6 hours PRN Moderate Pain (4 - 6)  dextrose Oral Gel 15 Gram(s) Oral once PRN Blood Glucose LESS THAN 70 milliGRAM(s)/deciliter  oxyCODONE    IR 5 milliGRAM(s) Oral every 6 hours PRN Severe Pain (7 - 10)    Allergies  No Known Allergies    Vital Signs Last 24 Hrs  T(C): 36.8 (31 Oct 2024 08:22), Max: 37.2 (31 Oct 2024 00:02)  T(F): 98.3 (31 Oct 2024 08:22), Max: 99 (31 Oct 2024 00:02)  HR: 57 (31 Oct 2024 08:22) (54 - 71)  BP: 132/76 (31 Oct 2024 08:22) (101/70 - 135/79)  BP(mean): --  RR: 18 (31 Oct 2024 08:22) (18 - 18)  SpO2: 97% (31 Oct 2024 08:22) (94% - 97%)    Parameters below as of 31 Oct 2024 08:22  Patient On (Oxygen Delivery Method): room air    PHYSICAL EXAM:  Constitutional: NAD  General: No apparent distress  Respiratory: Lungs clear bilaterally  Cardiac: +S1, S2, no m/r/g  GI: +BS, soft, non tender, non distended  Extremities: No peripheral edema  Neuro: A+O X3      LABS:                        11.3   4.63  )-----------( 159      ( 30 Oct 2024 05:00 )             33.6     10-30    139  |  101  |  19.9  ----------------------------<  144[H]  3.9   |  25.0  |  1.05    Ca    9.0      30 Oct 2024 05:00  Phos  4.4     10-30  Mg     2.0     10-30      Urinalysis Basic - ( 30 Oct 2024 05:00 )    Color: x / Appearance: x / SG: x / pH: x  Gluc: 144 mg/dL / Ketone: x  / Bili: x / Urobili: x   Blood: x / Protein: x / Nitrite: x   Leuk Esterase: x / RBC: x / WBC x   Sq Epi: x / Non Sq Epi: x / Bacteria: x    POCT Blood Glucose.: 134 mg/dL (10-31-24 @ 08:58)  POCT Blood Glucose.: 304 mg/dL (10-30-24 @ 20:38)  POCT Blood Glucose.: 276 mg/dL (10-30-24 @ 18:05)  POCT Blood Glucose.: 223 mg/dL (10-30-24 @ 12:02)    Free Thyroxine, Serum: 1.1 ng/dL (10-30-24 @ 05:00)  Thyroid Stimulating Hormone, Serum: 0.60 uIU/mL (10-29-24 @ 05:12)  Free Thyroxine, Serum: 1.0 ng/dL (10-29-24 @ 05:12)  Thyroid Stimulating Hormone, Serum: 0.38 uIU/mL (10-28-24 @ 00:47)   INTERVAL EVENTS:  Follow up diabetes management. Glucoses elevated yesterday, improved this morning to 134. C/o bilateral thigh discomfort.     MEDICATIONS  (STANDING):  atorvastatin 20 milliGRAM(s) Oral at bedtime  dextrose 5%. 1000 milliLiter(s) (100 mL/Hr) IV Continuous <Continuous>  dextrose 5%. 1000 milliLiter(s) (50 mL/Hr) IV Continuous <Continuous>  dextrose 50% Injectable 25 Gram(s) IV Push once  dextrose 50% Injectable 25 Gram(s) IV Push once  dextrose 50% Injectable 12.5 Gram(s) IV Push once  glucagon  Injectable 1 milliGRAM(s) IntraMuscular once  influenza   Vaccine 0.5 milliLiter(s) IntraMuscular once  insulin glargine Injectable (LANTUS) 2 Unit(s) SubCutaneous at bedtime  insulin lispro (ADMELOG) corrective regimen sliding scale   SubCutaneous three times a day before meals  insulin lispro (ADMELOG) corrective regimen sliding scale   SubCutaneous at bedtime  insulin lispro Injectable (ADMELOG) 2 Unit(s) SubCutaneous three times a day before meals  losartan 50 milliGRAM(s) Oral daily  metoprolol succinate ER 50 milliGRAM(s) Oral two times a day  multivitamin 1 Tablet(s) Oral daily  NIFEdipine XL 60 milliGRAM(s) Oral daily  pantoprazole    Tablet 40 milliGRAM(s) Oral before breakfast  polyethylene glycol 3350 17 Gram(s) Oral daily  senna 2 Tablet(s) Oral at bedtime  sodium chloride 0.9% lock flush 3 milliLiter(s) IV Push every 8 hours    MEDICATIONS  (PRN):  acetaminophen     Tablet .. 650 milliGRAM(s) Oral every 6 hours PRN Moderate Pain (4 - 6)  dextrose Oral Gel 15 Gram(s) Oral once PRN Blood Glucose LESS THAN 70 milliGRAM(s)/deciliter  oxyCODONE    IR 5 milliGRAM(s) Oral every 6 hours PRN Severe Pain (7 - 10)    Allergies  No Known Allergies    Vital Signs Last 24 Hrs  T(C): 36.8 (31 Oct 2024 08:22), Max: 37.2 (31 Oct 2024 00:02)  T(F): 98.3 (31 Oct 2024 08:22), Max: 99 (31 Oct 2024 00:02)  HR: 57 (31 Oct 2024 08:22) (54 - 71)  BP: 132/76 (31 Oct 2024 08:22) (101/70 - 135/79)  BP(mean): --  RR: 18 (31 Oct 2024 08:22) (18 - 18)  SpO2: 97% (31 Oct 2024 08:22) (94% - 97%)    Parameters below as of 31 Oct 2024 08:22  Patient On (Oxygen Delivery Method): room air    PHYSICAL EXAM:  Constitutional: NAD  General: No apparent distress  Respiratory: Lungs clear bilaterally  Cardiac: +S1, S2, no m/r/g  GI: +BS, soft, non tender, non distended  Extremities: No peripheral edema  Neuro: A+O X3      LABS:                        11.3   4.63  )-----------( 159      ( 30 Oct 2024 05:00 )             33.6     10-30    139  |  101  |  19.9  ----------------------------<  144[H]  3.9   |  25.0  |  1.05    Ca    9.0      30 Oct 2024 05:00  Phos  4.4     10-30  Mg     2.0     10-30    POCT Blood Glucose.: 134 mg/dL (10-31-24 @ 08:58)  POCT Blood Glucose.: 304 mg/dL (10-30-24 @ 20:38)  POCT Blood Glucose.: 276 mg/dL (10-30-24 @ 18:05)  POCT Blood Glucose.: 223 mg/dL (10-30-24 @ 12:02)    Free Thyroxine, Serum: 1.1 ng/dL (10-30-24 @ 05:00)  Thyroid Stimulating Hormone, Serum: 0.60 uIU/mL (10-29-24 @ 05:12)  Free Thyroxine, Serum: 1.0 ng/dL (10-29-24 @ 05:12)  Thyroid Stimulating Hormone, Serum: 0.38 uIU/mL (10-28-24 @ 00:47)

## 2024-10-31 NOTE — PROGRESS NOTE ADULT - ASSESSMENT
Assessment: 63M with PMH of DM, HTN, HLD, GERD, on ASA81 daily, known history of pituitary tumor diagnosed 8 years ago in [place] with no follow up since then, presents with generalized weakness x 1 day. Neurosurgery admitting for 2.7 x 2.7 x 3.9 cm pituitary mass with chiasmatic encroachment.    Plan:  - Q4 neuro checks  - All imaging reviewed: CTH and MRI Brain shows 2.7 x 2.7 x 3.9 cm pituitary mass with chiasmatic encroachment  - Endocrine recs appreciated  - Cardiology consult, w/u, recs and clearance appreciated  - SBP goal <160  - Resume home HTN/HLD meds  - Medicine consult and clearance pending  - Hormonal work-up WNL per endocrinology   - Hold ASA  - Bowel Regimen: Senna and Miralax  - DVT ppx: SCDs only    Patient and plan to be discussed with Dr. Harmon.  Assessment: 63M with PMH of DM, HTN, HLD, GERD, on ASA81 daily, known history of pituitary tumor diagnosed 8 years ago in [place] with no follow up since then, presents with generalized weakness x 1 day. Neurosurgery admitting for 2.7 x 2.7 x 3.9 cm pituitary mass with chiasmatic encroachment.    Plan:  - Q4 neuro checks  - All imaging reviewed: CTH and MRI Brain shows 2.7 x 2.7 x 3.9 cm pituitary mass with chiasmatic encroachment  - Endocrine recs appreciated  - Cardiology consult, w/u, recs and clearance appreciated; patient optimized and cleared by cardiology on 10/28/24  - SBP goal <160  - Resume home HTN/HLD meds  - Medicine consult and clearance pending  - Hormonal work-up WNL per endocrinology   - Hold ASA  - Bowel Regimen: Senna and Miralax  - DVT ppx: SCDs only    Patient and plan to be discussed with Dr. Harmon. Patient planned for OR next week with Dr. Harmon.

## 2024-10-31 NOTE — PROGRESS NOTE ADULT - ASSESSMENT
63 M with PMH of DM, HTN, HLD, GERD, on ASA81 daily, known history of pituitary tumor diagnosed 8 years ago in [facility] with no follow up since then, presents with generalized weakness x 1 day. Patient reports difficulty getting out of his chair with unsteady gait since this morning. Patient currently c/o mild headache and burning with urination. Denies any recent falls/trauma, vision changes, dizziness, nausea/vomiting, numbness, or tingling. 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 Pt also noted to have TWI on EKG, cards consulted and further w/u pending. Pt reportedly claims to be compliant on home cardiac medications. Pt admitted to neurosurgery for likely surgical resection of pituitary tumor with Dr. Harmon.     Pituitary tumor   - MRI brain reviewed: Large pituitary tumor, likely adenoma, extensively elevates and distorts the optic chiasm and forebrain, invades the sphenoid sinus to the right of midline and likely invades the right cavernous sinus.  - Neuro checks   - NSG plan for surgical resection this admission, likely next week   - Neuro ophtho eval arranged for today per NSG   - SBP goal <160  - Hormonal evaluation per Endocrine   - Pt is medically optimized for Surgery     Abnormal EKG   - TTE reviewed   - Cardiac CTA done: Minimal non-obstructive CAD   - Cardio on board     DM   - A1C   - c/w Insulin and ISS  - BG monitoring     HTN  HLD   - c/w Toprol XL, Losartan, Nifedipine  - c/w statin

## 2024-10-31 NOTE — PROGRESS NOTE ADULT - SUBJECTIVE AND OBJECTIVE BOX
HPI: 63M with PMH of DM, HTN, HLD, GERD, on ASA81 daily, known history of pituitary tumor diagnosed 8 years ago in [place] with no follow up since then, presents with generalized weakness x 1 day. Patient reports difficulty getting out of his chair with unsteady gait since this morning. Patient currently c/o mild headache and burning with urination. Denies any recent falls/trauma, vision changes, dizziness, nausea/vomiting, numbness, or tingling. 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 Pt also noted to have TWI on EKG, cards consulted and further w/u pending. Pt reportedly claims to be compliant on home cardiac medications. Pt admitted to neurosurgery for likely surgical resection of pituitary tumor with Dr. Harmon.    INTERVAL HPI/OVERNIGHT EVENTS: 63y Male seen lying comfortably in bed. Tolerating diet. Passing gas/BM. Voiding. Denies headache, weakness, numbness, n/v/d, fevers, chills, chest pain, SOB. Provider spoke with Meaghan from transfer center and confirmed patient is  time is 13:00 for neuro opthalmology evaluation; RN updated  time was confirmed with transfer center.     Vital Signs Last 24 Hrs  T(C): 36.4 (31 Oct 2024 11:42), Max: 37.2 (31 Oct 2024 00:02)  T(F): 97.6 (31 Oct 2024 11:42), Max: 99 (31 Oct 2024 00:02)  HR: 56 (31 Oct 2024 11:42) (54 - 71)  BP: 100/63 (31 Oct 2024 11:42) (100/63 - 135/79)  RR: 16 (31 Oct 2024 11:42) (16 - 18)  SpO2: 95% (31 Oct 2024 11:42) (94% - 97%)    Parameters below as of 31 Oct 2024 11:42  Patient On (Oxygen Delivery Method): room air    Physical Exam:  General: NAD, NCAT, resting comfortably in bed  Cardio: NSR on monitor  Lungs: respirations unlabored on RA  Neuro: AOX4,  Awake; Opens eyes spontaneously; Appropriately conversant without aphasia; following simple commands.  PERRL. EOMI without nystagmus. Face symmetric w/ normal eye closure and smile, tongue midline. Hearing grossly intact. Speech clear. Head turning and shoulder shrug intact. Visual fields intact. strength 5/5 b/l upper and lower extremities. No focal deficits appreciated   AMARIS COMA SCORE: E- V- M- = 15      RADIOLOGY:  < from: MR Sella alone w/wo IV Cont (10.28.24 @ 07:26) >ACC: [id number] EXAM:  MR SELLA ONLY WAW IC   ORDERED BY: KELLY SEWELL /ACC: [id number] EXAM:  MR BRAIN WAW IC   ORDERED BY: JANET ROMERO -PROCEDURE DATE:  10/28/2024    INTERPRETATION:  MR of the pituitary/brain with and without gadolinium  contrast    CLINICAL INFORMATION:   brain tumor    mr w pituitary focus  XMR    TECHNIQUE:   Sagittal T1-weighted images, axial FLAIR images and axial   diffusion weighted images of the brain were obtained.  High-resolution   images were obtained through the pituitary including coronal T1-weighted   and T2-weighted imaging.   Coronal T1-weighted images obtained as a   dynamic acquisition preceding and during the rapid bolus administration   of gadolinium at multiple time points.  Directly following sagittal and   coronal postgadolinium images of the pituitary were obtained.   Axial   post gadolinium volumetric T1 weighted images the brain were obtained and   reconstructed in the sagittal and coronal planes.  CONTRAST:    Gadavist:     10 cc administered  ;  0 cc discarded    COMPARISON:   CT head and CT angiography preceding day    FINDINGS:    PITUITARY    Large pituitary tumor expands and distorts the sella. This lesion extends   upward obliterating the suprasellar cistern and considerably elevating   the optic chiasm. The anterior cerebral arteries are displaced right to   left, anterior to posterior and inferior to superior, without apparent   narrowing of the lesion extends within the right cavernous sinus lateral   to the apex of the internal carotid artery. At the left cavernous sinus   tumor remains medial to the internal carotid artery. The lesion extends   downward remodeling the sella or for an extensively invading the right   sphenoid sinus. Within the further lateral pterygoid recesses and   posterior superior and aspect of the sphenoid sinus T1 hyperintense   material suggests trapped secretions. Tumor appears to perforate the   sphenoid septum with right to left extension of tumor into the left   sphenoid sinus.The tumor is heterogeneous on the long TR images with   hyperintensity near its vertex but differentially greater enhancement,   suggesting cystic and vascular tumor. Overall the lesion measures   approximately 4.8 cm in height x 3.3 cm AP x 8 2.9 cmtransverse in   maximum dimensions. The elevated ventral forebrain is distorted and   demonstrates no signal intensity change or evidence of invasion.    BRAIN    BRAIN PARENCHYMA:   The brain demonstrates patchy and confluent lesions   within the deep cerebral hemispheric white matter typical for ischemic   white matter disease. These lesions are hyperintense on the long TR   images, hypointense on T1-weighted images are most confluent, otherwise   inconspicuous. These lesions most extensively involve the posterior   centrum semiovale, periatrial and posterior periventricular white matter.   Additional subcortical and deeper lesions are present. At least mild   ventral pontine involvement is present. No diffusion restriction is found   in the brain.  No acute cerebral cortical infarct is found.   No   intracranial hemorrhage is recognized.  No mass effect is found in the   brain. No abnormal enhancement is noted intrinsic to the brain.    CSF SPACES:   The ventricles, sulci and basal cisterns appear mildly   dilated reflecting diffuse brain volume loss.    VESSELS:   The principal dural sinuses enhance indicating their patency.    The vertebral and internal carotid arteries demonstrate expected flow   voids indicating their patency.    HEAD AND NECK STRUCTURES:   The orbits are unremarkable.  Paranasal   sinuses also demonstrate lobulated mucosal disease within the maxillary   sinuses, ethmoid air cells and frontal sinuses at each nasofrontal   recess.  The nasal cavity appears intact.   The nasopharynx is symmetric.    The temporal bones appear clear of disease.  The calvarium appears   unremarkable.    IMPRESSION:    1. 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    2. BRAIN:   Ischemic white matter disease greater than typical for age.   Diffuse brain volume loss typical for age.    --- End ofReport ---    JENNIFER GONZALEZ MD; Attending Radiologist  This document has been electronically signed. Oct 28 2024  7:41AM    < end of copied text >    < from: CT Head No Cont (10.27.24 @ 17:03) >ACC: [id number] EXAM:  CT BRAIN   ORDERED BY: JANET ROMERO /ACC: [id number] EXAM:  CT ANGIO NECK (W)AW IC   ORDERED BY: JANET ROMERO /ACC: [id number] EXAM:  CT ANGIO BRAIN (W)AW IC   ORDERED BY: JANET ROMERO -PROCEDURE DATE:  10/27/2024    INTERPRETATION:  EXAMINATION: CT ANGIO BRAIN, CT ANGIO NECK, CT HEAD    CLINICAL INDICATION: unsteady gait  TECHNIQUE: CT images of the head were obtained without contrast. Thin   section CT axial images of the head and neck vasculature were obtained   following IV injection of Omnipaque 350, 90 cc administered. Multiple 3D   and MIP reformats were created on a separate workstation and submitted   for review. Dose reduction techniques were utilized including but not   limited to automated exposure control(AEC), iterative reconstruction   technique, and/or mA and/or kV dose adjustments based on patient size.  COMPARISON: None.    FINDINGS:    There is a large mass in the sella and suprasellar cistern with solid and   cystic components. The lesion measures approximately 2.7 x 2.7 x 3.9 cm.   There is associated chiasmatic encroachment and invasion of both   cavernous sinuses.    The tumor is intimately associated with the [place], coming into   contact with both carotid arteries, the rightPCOM, the left A1 segment,   and both ACAs, which are deviated to the left. 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 Above the   posterior clinoid process, the tumor is 3 mm from the basilar artery.    Thereis depression and erosion of the floor of the sella, with suspected   invasion of the sphenoid sinuses. The right sphenoid sinus is completely   opacified, and the right sphenoethmoidal recess is expanded.    The appearance is nonspecific, but suspicious for a pituitary adenoma   until proven otherwise. Meningioma is also in the differential diagnosis.   Neurosurgical consultation is recommended. This could be characterized   more definitively with a dedicated pituitary MRI.    Moderate generalized cerebral volume loss, with distention of the sulci   and concomitant ex-vacuo ventricular dilatation. Moderate nonspecific low   attenuation in the periventricular and subcortical white matter, likely   due to small vessel disease.    No acute intracranial hemorrhage. No midline shift or herniation. No CT   evidence of acute territorial infarction, although MRI with DWI would be   more sensitive.    A large retention cyst is noted in the right maxillary sinus. There are   no air-fluid levels. The mastoids are clear. Limited views of the orbits   and visualized soft tissues of the neck, face, scalp, skull base, and   calvarium are otherwise unremarkable.    Calcified plaque without significant narrowing at the great vessel   origins and bothcarotid bifurcations.    On the right, the ICA, ECA, CCA, and brachiocephalic artery are patent.   On the left, the ICA, ECA, CCA are patent. The vertebrals and subclavian   arteries are patent. No arterial dissection or ulcerated plaque.    Calcified plaque without significant narrowing at both carotid siphons.    No intracranial aneurysms or large vessel occlusions. No large feeding   arteries or draining veins. The ACAs, MCAs, PCAs, and carotid siphons are   otherwise negative. The basilar artery and V4 vertebral segments are   otherwise negative. Abnormalities of  vessels and distal   intracranial branches are beyond the resolution of this technique, and   catheter angiography would be more sensitive.    The dural venous sinusesare grossly patent, although this examination   wasn't optimized to evaluate the cerebral veins. There is moderate   compression of both internal jugular veins by the styloid processes at   the skull base.    There is a vascular lesion dorsal to the right lobe of the thyroid gland,   measuring 12 x 11 x 18 mm. This could represent an exophytic thyroid   nodule or a parathyroid adenoma.    Degenerative changes in the cervical spine, with osteophytic left C6-7   foraminal encroachment. No gross evidence of an acute fracture, although   this examination wasn't optimized to evaluate the spine.    IMPRESSION:    1.  Large pituitary tumor with chiasmatic encroachment, consider   pituitary MRI.  2.  Invasion of the cavernous sinuses and sphenoid sinuses.  3.  Incidental vascular lesion dorsal to the right thyroid.      Patient Name: SAI HENDRIX  MRN: [id number], : 60    --- End of Report ---    ASHLEY LOWRY MD; Attending Radiologist  This document has been electronically signed. Oct 27 2024  6:02PM    < end of copied text >    LABS:                        11.3   4.63  )-----------( 159      ( 30 Oct 2024 05:00 )             33.6       10-30    139  |  101  |  19.9  ----------------------------<  144[H]  3.9   |  25.0  |  1.05    Ca    9.0      30 Oct 2024 05:00  Phos  4.4     10-30  Mg     2.0     10-30    Urinalysis Basic - ( 30 Oct 2024 05:00 )  Color: x / Appearance: x / SG: x / pH: x  Gluc: 144 mg/dL / Ketone: x  / Bili: x / Urobili: x   Blood: x / Protein: x / Nitrite: x   Leuk Esterase: x / RBC: x / WBC x   Sq Epi: x / Non Sq Epi: x / Bacteria: x      CAPILLARY BLOOD GLUCOSE  POCT Blood Glucose.: 134 mg/dL (31 Oct 2024 08:58)           HPI: 63M with PMH of DM, HTN, HLD, GERD, on ASA81 daily, known history of pituitary tumor diagnosed 8 years ago in [place] with no follow up since then, presents with generalized weakness x 1 day. Patient reports difficulty getting out of his chair with unsteady gait since this morning. Patient currently c/o mild headache and burning with urination. Denies any recent falls/trauma, vision changes, dizziness, nausea/vomiting, numbness, or tingling. 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 Pt also noted to have TWI on EKG, cards consulted and further w/u pending. Pt reportedly claims to be compliant on home cardiac medications. Pt admitted to neurosurgery for likely surgical resection of pituitary tumor with Dr. Harmon.    INTERVAL HPI/OVERNIGHT EVENTS: 63y Male seen lying comfortably in bed. Tolerating diet. Passing gas/BM. Voiding. Denies headache, weakness, numbness, n/v/d, fevers, chills, chest pain, SOB. Provider spoke with Meaghan from transfer center and confirmed patient is  time is 13:00 for neuro opthalmology evaluation; RN updated  time was confirmed with transfer center.     Vital Signs Last 24 Hrs  T(C): 36.4 (31 Oct 2024 11:42), Max: 37.2 (31 Oct 2024 00:02)  T(F): 97.6 (31 Oct 2024 11:42), Max: 99 (31 Oct 2024 00:02)  HR: 56 (31 Oct 2024 11:42) (54 - 71)  BP: 100/63 (31 Oct 2024 11:42) (100/63 - 135/79)  RR: 16 (31 Oct 2024 11:42) (16 - 18)  SpO2: 95% (31 Oct 2024 11:42) (94% - 97%)    Parameters below as of 31 Oct 2024 11:42  Patient On (Oxygen Delivery Method): room air    Physical Exam:  General: NAD, NCAT, resting comfortably in bed  Cardio: NSR on monitor  Lungs: respirations unlabored on RA  Neuro: AOX4,  Awake; Opens eyes spontaneously; Appropriately conversant without aphasia; following simple commands.  PERRL. EOMI without nystagmus. Face symmetric w/ normal eye closure and smile, tongue midline. Hearing grossly intact. Speech clear. Head turning and shoulder shrug intact. Visual fields intact. Strength 5/5 b/l upper and lower extremities. No focal deficits appreciated   AMARIS COMA SCORE: E- V- M- = 15      RADIOLOGY:  < from: MR Sella alone w/wo IV Cont (10.28.24 @ 07:26) >ACC: [id number] EXAM:  MR SELLA ONLY WAW IC   ORDERED BY: KELLY SEWELL /ACC: [id number] EXAM:  MR BRAIN WAW IC   ORDERED BY: JANET ROMERO -PROCEDURE DATE:  10/28/2024    INTERPRETATION:  MR of the pituitary/brain with and without gadolinium  contrast    CLINICAL INFORMATION:   brain tumor    mr w pituitary focus  XMR    TECHNIQUE:   Sagittal T1-weighted images, axial FLAIR images and axial   diffusion weighted images of the brain were obtained.  High-resolution   images were obtained through the pituitary including coronal T1-weighted   and T2-weighted imaging.   Coronal T1-weighted images obtained as a   dynamic acquisition preceding and during the rapid bolus administration   of gadolinium at multiple time points.  Directly following sagittal and   coronal postgadolinium images of the pituitary were obtained.   Axial   post gadolinium volumetric T1 weighted images the brain were obtained and   reconstructed in the sagittal and coronal planes.  CONTRAST:    Gadavist:     10 cc administered  ;  0 cc discarded    COMPARISON:   CT head and CT angiography preceding day    FINDINGS:    PITUITARY    Large pituitary tumor expands and distorts the sella. This lesion extends   upward obliterating the suprasellar cistern and considerably elevating   the optic chiasm. The anterior cerebral arteries are displaced right to   left, anterior to posterior and inferior to superior, without apparent   narrowing of the lesion extends within the right cavernous sinus lateral   to the apex of the internal carotid artery. At the left cavernous sinus   tumor remains medial to the internal carotid artery. The lesion extends   downward remodeling the sella or for an extensively invading the right   sphenoid sinus. Within the further lateral pterygoid recesses and   posterior superior and aspect of the sphenoid sinus T1 hyperintense   material suggests trapped secretions. Tumor appears to perforate the   sphenoid septum with right to left extension of tumor into the left   sphenoid sinus.The tumor is heterogeneous on the long TR images with   hyperintensity near its vertex but differentially greater enhancement,   suggesting cystic and vascular tumor. Overall the lesion measures   approximately 4.8 cm in height x 3.3 cm AP x 8 2.9 cmtransverse in   maximum dimensions. The elevated ventral forebrain is distorted and   demonstrates no signal intensity change or evidence of invasion.    BRAIN    BRAIN PARENCHYMA:   The brain demonstrates patchy and confluent lesions   within the deep cerebral hemispheric white matter typical for ischemic   white matter disease. These lesions are hyperintense on the long TR   images, hypointense on T1-weighted images are most confluent, otherwise   inconspicuous. These lesions most extensively involve the posterior   centrum semiovale, periatrial and posterior periventricular white matter.   Additional subcortical and deeper lesions are present. At least mild   ventral pontine involvement is present. No diffusion restriction is found   in the brain.  No acute cerebral cortical infarct is found.   No   intracranial hemorrhage is recognized.  No mass effect is found in the   brain. No abnormal enhancement is noted intrinsic to the brain.    CSF SPACES:   The ventricles, sulci and basal cisterns appear mildly   dilated reflecting diffuse brain volume loss.    VESSELS:   The principal dural sinuses enhance indicating their patency.    The vertebral and internal carotid arteries demonstrate expected flow   voids indicating their patency.    HEAD AND NECK STRUCTURES:   The orbits are unremarkable.  Paranasal   sinuses also demonstrate lobulated mucosal disease within the maxillary   sinuses, ethmoid air cells and frontal sinuses at each nasofrontal   recess.  The nasal cavity appears intact.   The nasopharynx is symmetric.    The temporal bones appear clear of disease.  The calvarium appears   unremarkable.    IMPRESSION:    1. 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    2. BRAIN:   Ischemic white matter disease greater than typical for age.   Diffuse brain volume loss typical for age.    --- End ofReport ---    JENNIFER GONZALEZ MD; Attending Radiologist  This document has been electronically signed. Oct 28 2024  7:41AM    < end of copied text >    < from: CT Head No Cont (10.27.24 @ 17:03) >ACC: [id number] EXAM:  CT BRAIN   ORDERED BY: JANET ROMERO /ACC: [id number] EXAM:  CT ANGIO NECK (W)AW IC   ORDERED BY: JANET ROMERO /ACC: [id number] EXAM:  CT ANGIO BRAIN (W)AW IC   ORDERED BY: JANET ROMERO -PROCEDURE DATE:  10/27/2024    INTERPRETATION:  EXAMINATION: CT ANGIO BRAIN, CT ANGIO NECK, CT HEAD    CLINICAL INDICATION: unsteady gait  TECHNIQUE: CT images of the head were obtained without contrast. Thin   section CT axial images of the head and neck vasculature were obtained   following IV injection of Omnipaque 350, 90 cc administered. Multiple 3D   and MIP reformats were created on a separate workstation and submitted   for review. Dose reduction techniques were utilized including but not   limited to automated exposure control(AEC), iterative reconstruction   technique, and/or mA and/or kV dose adjustments based on patient size.  COMPARISON: None.    FINDINGS:    There is a large mass in the sella and suprasellar cistern with solid and   cystic components. The lesion measures approximately 2.7 x 2.7 x 3.9 cm.   There is associated chiasmatic encroachment and invasion of both   cavernous sinuses.    The tumor is intimately associated with the [place], coming into   contact with both carotid arteries, the rightPCOM, the left A1 segment,   and both ACAs, which are deviated to the left. 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 Above the   posterior clinoid process, the tumor is 3 mm from the basilar artery.    Thereis depression and erosion of the floor of the sella, with suspected   invasion of the sphenoid sinuses. The right sphenoid sinus is completely   opacified, and the right sphenoethmoidal recess is expanded.    The appearance is nonspecific, but suspicious for a pituitary adenoma   until proven otherwise. Meningioma is also in the differential diagnosis.   Neurosurgical consultation is recommended. This could be characterized   more definitively with a dedicated pituitary MRI.    Moderate generalized cerebral volume loss, with distention of the sulci   and concomitant ex-vacuo ventricular dilatation. Moderate nonspecific low   attenuation in the periventricular and subcortical white matter, likely   due to small vessel disease.    No acute intracranial hemorrhage. No midline shift or herniation. No CT   evidence of acute territorial infarction, although MRI with DWI would be   more sensitive.    A large retention cyst is noted in the right maxillary sinus. There are   no air-fluid levels. The mastoids are clear. Limited views of the orbits   and visualized soft tissues of the neck, face, scalp, skull base, and   calvarium are otherwise unremarkable.    Calcified plaque without significant narrowing at the great vessel   origins and bothcarotid bifurcations.    On the right, the ICA, ECA, CCA, and brachiocephalic artery are patent.   On the left, the ICA, ECA, CCA are patent. The vertebrals and subclavian   arteries are patent. No arterial dissection or ulcerated plaque.    Calcified plaque without significant narrowing at both carotid siphons.    No intracranial aneurysms or large vessel occlusions. No large feeding   arteries or draining veins. The ACAs, MCAs, PCAs, and carotid siphons are   otherwise negative. The basilar artery and V4 vertebral segments are   otherwise negative. Abnormalities of  vessels and distal   intracranial branches are beyond the resolution of this technique, and   catheter angiography would be more sensitive.    The dural venous sinusesare grossly patent, although this examination   wasn't optimized to evaluate the cerebral veins. There is moderate   compression of both internal jugular veins by the styloid processes at   the skull base.    There is a vascular lesion dorsal to the right lobe of the thyroid gland,   measuring 12 x 11 x 18 mm. This could represent an exophytic thyroid   nodule or a parathyroid adenoma.    Degenerative changes in the cervical spine, with osteophytic left C6-7   foraminal encroachment. No gross evidence of an acute fracture, although   this examination wasn't optimized to evaluate the spine.    IMPRESSION:    1.  Large pituitary tumor with chiasmatic encroachment, consider   pituitary MRI.  2.  Invasion of the cavernous sinuses and sphenoid sinuses.  3.  Incidental vascular lesion dorsal to the right thyroid.      Patient Name: SAI HENDRIX  MRN: [id number], : 60    --- End of Report ---    ASHLEY LOWRY MD; Attending Radiologist  This document has been electronically signed. Oct 27 2024  6:02PM    < end of copied text >    LABS:                        11.3   4.63  )-----------( 159      ( 30 Oct 2024 05:00 )             33.6       10-30    139  |  101  |  19.9  ----------------------------<  144[H]  3.9   |  25.0  |  1.05    Ca    9.0      30 Oct 2024 05:00  Phos  4.4     10-30  Mg     2.0     10-30    Urinalysis Basic - ( 30 Oct 2024 05:00 )  Color: x / Appearance: x / SG: x / pH: x  Gluc: 144 mg/dL / Ketone: x  / Bili: x / Urobili: x   Blood: x / Protein: x / Nitrite: x   Leuk Esterase: x / RBC: x / WBC x   Sq Epi: x / Non Sq Epi: x / Bacteria: x      CAPILLARY BLOOD GLUCOSE  POCT Blood Glucose.: 134 mg/dL (31 Oct 2024 08:58)

## 2024-11-01 LAB
ANION GAP SERPL CALC-SCNC: 14 MMOL/L — SIGNIFICANT CHANGE UP (ref 5–17)
BUN SERPL-MCNC: 21.1 MG/DL — HIGH (ref 8–20)
CALCIUM SERPL-MCNC: 8.5 MG/DL — SIGNIFICANT CHANGE UP (ref 8.4–10.5)
CHLORIDE SERPL-SCNC: 102 MMOL/L — SIGNIFICANT CHANGE UP (ref 96–108)
CO2 SERPL-SCNC: 23 MMOL/L — SIGNIFICANT CHANGE UP (ref 22–29)
CREAT SERPL-MCNC: 0.9 MG/DL — SIGNIFICANT CHANGE UP (ref 0.5–1.3)
EGFR: 96 ML/MIN/1.73M2 — SIGNIFICANT CHANGE UP
GLUCOSE BLDC GLUCOMTR-MCNC: 147 MG/DL — HIGH (ref 70–99)
GLUCOSE BLDC GLUCOMTR-MCNC: 167 MG/DL — HIGH (ref 70–99)
GLUCOSE BLDC GLUCOMTR-MCNC: 180 MG/DL — HIGH (ref 70–99)
GLUCOSE BLDC GLUCOMTR-MCNC: 249 MG/DL — HIGH (ref 70–99)
GLUCOSE SERPL-MCNC: 153 MG/DL — HIGH (ref 70–99)
HCT VFR BLD CALC: 34.9 % — LOW (ref 39–50)
HGB BLD-MCNC: 11.4 G/DL — LOW (ref 13–17)
MAGNESIUM SERPL-MCNC: 2 MG/DL — SIGNIFICANT CHANGE UP (ref 1.6–2.6)
MCHC RBC-ENTMCNC: 28 PG — SIGNIFICANT CHANGE UP (ref 27–34)
MCHC RBC-ENTMCNC: 32.7 G/DL — SIGNIFICANT CHANGE UP (ref 32–36)
MCV RBC AUTO: 85.7 FL — SIGNIFICANT CHANGE UP (ref 80–100)
PHOSPHATE SERPL-MCNC: 3.7 MG/DL — SIGNIFICANT CHANGE UP (ref 2.4–4.7)
PLATELET # BLD AUTO: 180 K/UL — SIGNIFICANT CHANGE UP (ref 150–400)
POTASSIUM SERPL-MCNC: 3.8 MMOL/L — SIGNIFICANT CHANGE UP (ref 3.5–5.3)
POTASSIUM SERPL-SCNC: 3.8 MMOL/L — SIGNIFICANT CHANGE UP (ref 3.5–5.3)
RBC # BLD: 4.07 M/UL — LOW (ref 4.2–5.8)
RBC # FLD: 11.3 % — SIGNIFICANT CHANGE UP (ref 10.3–14.5)
SODIUM SERPL-SCNC: 139 MMOL/L — SIGNIFICANT CHANGE UP (ref 135–145)
WBC # BLD: 4.97 K/UL — SIGNIFICANT CHANGE UP (ref 3.8–10.5)
WBC # FLD AUTO: 4.97 K/UL — SIGNIFICANT CHANGE UP (ref 3.8–10.5)

## 2024-11-01 PROCEDURE — 99232 SBSQ HOSP IP/OBS MODERATE 35: CPT

## 2024-11-01 RX ORDER — POTASSIUM CHLORIDE 10 MEQ
20 TABLET, EXTENDED RELEASE ORAL ONCE
Refills: 0 | Status: COMPLETED | OUTPATIENT
Start: 2024-11-01 | End: 2024-11-01

## 2024-11-01 RX ADMIN — Medication 4 UNIT(S): at 21:33

## 2024-11-01 RX ADMIN — Medication 1 TABLET(S): at 11:58

## 2024-11-01 RX ADMIN — Medication 2: at 09:08

## 2024-11-01 RX ADMIN — Medication 60 MILLIGRAM(S): at 05:25

## 2024-11-01 RX ADMIN — Medication 4: at 11:59

## 2024-11-01 RX ADMIN — Medication 2 TABLET(S): at 21:31

## 2024-11-01 RX ADMIN — SODIUM CHLORIDE 3 MILLILITER(S): 9 INJECTION, SOLUTION INTRAMUSCULAR; INTRAVENOUS; SUBCUTANEOUS at 22:02

## 2024-11-01 RX ADMIN — SODIUM CHLORIDE 3 MILLILITER(S): 9 INJECTION, SOLUTION INTRAMUSCULAR; INTRAVENOUS; SUBCUTANEOUS at 03:55

## 2024-11-01 RX ADMIN — PANTOPRAZOLE SODIUM 40 MILLIGRAM(S): 40 TABLET, DELAYED RELEASE ORAL at 05:25

## 2024-11-01 RX ADMIN — POLYETHYLENE GLYCOL 3350 17 GRAM(S): 17 POWDER, FOR SOLUTION ORAL at 12:00

## 2024-11-01 RX ADMIN — Medication 4 UNIT(S): at 09:11

## 2024-11-01 RX ADMIN — LOSARTAN POTASSIUM 50 MILLIGRAM(S): 25 TABLET ORAL at 05:25

## 2024-11-01 RX ADMIN — Medication 50 MILLIGRAM(S): at 18:06

## 2024-11-01 RX ADMIN — Medication 50 MILLIGRAM(S): at 05:25

## 2024-11-01 RX ADMIN — SODIUM CHLORIDE 3 MILLILITER(S): 9 INJECTION, SOLUTION INTRAMUSCULAR; INTRAVENOUS; SUBCUTANEOUS at 12:49

## 2024-11-01 RX ADMIN — Medication 4 UNIT(S): at 18:05

## 2024-11-01 RX ADMIN — Medication 4 UNIT(S): at 12:00

## 2024-11-01 RX ADMIN — Medication 20 MILLIEQUIVALENT(S): at 11:59

## 2024-11-01 RX ADMIN — Medication 20 MILLIGRAM(S): at 21:31

## 2024-11-01 RX ADMIN — SODIUM CHLORIDE 3 MILLILITER(S): 9 INJECTION, SOLUTION INTRAMUSCULAR; INTRAVENOUS; SUBCUTANEOUS at 05:32

## 2024-11-01 NOTE — PROGRESS NOTE ADULT - SUBJECTIVE AND OBJECTIVE BOX
HPI: 63M with PMH of DM, HTN, HLD, GERD, on ASA81 daily, known history of pituitary tumor diagnosed 8 years ago in [place] with no follow up since then, presents with generalized weakness x 1 day. Patient reports difficulty getting out of his chair with unsteady gait since this morning. Patient currently c/o mild headache and burning with urination. Denies any recent falls/trauma, vision changes, dizziness, nausea/vomiting, numbness, or tingling. 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 Pt also noted to have TWI on EKG, cards consulted and further w/u pending. Pt reportedly claims to be compliant on home cardiac medications. Pt admitted to neurosurgery for likely surgical resection of pituitary tumor with Dr. Harmon.    INTERVAL HPI/OVERNIGHT EVENTS: 63y Male seen lying comfortably in bed. Tolerating diet. Passing gas/BM. Voiding. Denies headache, weakness, numbness, n/v/d, fevers, chills, chest pain, SOB. Patient transferred yesterday to [facility] for neuro-ophthalmic evaluation with Dr. Chua. Reports reviewed by neurosurgery team, however, it was noted the peripheral fields are not documented. Nsx supervisor reached out to [facility] neuro-ophthalmic team to ascertain full report.  If peripheral visual fields were not completed, patient will likely need to be transferred back to [facility] for repeat neuro-ophthalmic eval prior to tentative OR next Wednesday with Dr. Harmon.    Vital Signs Last 24 Hrs  T(C): 36.7 (2024 08:14), Max: 37.1 (31 Oct 2024 16:43)  T(F): 98.1 (2024 08:14), Max: 98.7 (31 Oct 2024 16:43)  HR: 54 (2024 08:14) (54 - 63)  BP: 105/62 (2024 08:14) (100/63 - 145/81)  RR: 18 (2024 08:14) (16 - 18)  SpO2: 96% (2024 08:14) (94% - 99%)    Parameters below as of 2024 08:14  Patient On (Oxygen Delivery Method): room air    Physical Exam:  General: NAD, NCAT, resting comfortably in bed  Cardio: NSR on monitor   Lungs: respirations unlabored on RA  Neuro: AOX4, speech is clear & fluent without dysarthria and aphasia, MONTERO AG 5/5 throughout, sensation and strength intact distally and proximally, no pronator drift or facial droop appreciated, PERRL, EOMI without nystagmus, visual fields intact on exam, no focal deficits appreciated   GCS: 15 (E4 / V5 / M6)    RADIOLOGY:  < from: MR Sella alone w/wo IV Cont (10.28.24 @ 07:26) >ACC: [id number] EXAM:  MR SELLA ONLY WAW IC   ORDERED BY: KELLY SEWELL /ACC: [id number] EXAM:  MR BRAIN WAW IC   ORDERED BY: JANET ROMERO -PROCEDURE DATE:  10/28/2024  -INTERPRETATION:  MR of the pituitary/brain with and without gadolinium  contrast  CLINICAL INFORMATION:   brain tumor    mr w pituitary focus  XMR    TECHNIQUE:   Sagittal T1-weighted images, axial FLAIR images and axial   diffusion weighted images of the brain were obtained.  High-resolution   images were obtained through the pituitary including coronal T1-weighted   and T2-weighted imaging.   Coronal T1-weighted images obtained as a   dynamic acquisition preceding and during the rapid bolus administration   of gadolinium at multiple time points.  Directly following sagittal and   coronal postgadolinium images of the pituitary were obtained.   Axial   post gadolinium volumetric T1 weighted images the brain were obtained and   reconstructed in the sagittal and coronal planes.  CONTRAST:    Gadavist:     10 cc administered  ;  0 cc discarded    COMPARISON:   CT head and CT angiography preceding day    FINDINGS:    PITUITARY    Large pituitary tumor expands and distorts the sella. This lesion extends   upward obliterating the suprasellar cistern and considerably elevating   the optic chiasm. The anterior cerebral arteries are displaced right to   left, anterior to posterior and inferior to superior, without apparent   narrowing of the lesion extends within the right cavernous sinus lateral   to the apex of the internal carotid artery. At the left cavernous sinus   tumor remains medial to the internal carotid artery. The lesion extends   downward remodeling the sella or for an extensively invading the right   sphenoid sinus. Within the further lateral pterygoid recesses and   posterior superior and aspect of the sphenoid sinus T1 hyperintense   material suggests trapped secretions. Tumor appears to perforate the   sphenoid septum with right to left extension of tumor into the left   sphenoid sinus.The tumor is heterogeneous on the long TR images with   hyperintensity near its vertex but differentially greater enhancement,   suggesting cystic and vascular tumor. Overall the lesion measures   approximately 4.8 cm in height x 3.3 cm AP x 8 2.9 cmtransverse in   maximum dimensions. The elevated ventral forebrain is distorted and   demonstrates no signal intensity change or evidence of invasion.    BRAIN    BRAIN PARENCHYMA:   The brain demonstrates patchy and confluent lesions   within the deep cerebral hemispheric white matter typical for ischemic   white matter disease. These lesions are hyperintense on the long TR   images, hypointense on T1-weighted images are most confluent, otherwise   inconspicuous. These lesions most extensively involve the posterior   centrum semiovale, periatrial and posterior periventricular white matter.   Additional subcortical and deeper lesions are present. At least mild   ventral pontine involvement is present. No diffusion restriction is found   in the brain.  No acute cerebral cortical infarct is found.   No   intracranial hemorrhage is recognized.  No mass effect is found in the   brain. No abnormal enhancement is noted intrinsic to the brain.    CSF SPACES:   The ventricles, sulci and basal cisterns appear mildly   dilated reflecting diffuse brain volume loss.    VESSELS:   The principal dural sinuses enhance indicating their patency.    The vertebral and internal carotid arteries demonstrate expected flow   voids indicating their patency.    HEAD AND NECK STRUCTURES:   The orbits are unremarkable.  Paranasal   sinuses also demonstrate lobulated mucosal disease within the maxillary   sinuses, ethmoid air cells and frontal sinuses at each nasofrontal   recess.  The nasal cavity appears intact.   The nasopharynx is symmetric.    The temporal bones appear clear of disease.  The calvarium appears   unremarkable.    IMPRESSION:    1. 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    2. BRAIN:   Ischemic white matter disease greater than typical for age.   Diffuse brain volume loss typical for age.    --- End ofReport ---    JENNIFER GONZALEZ MD; Attending Radiologist  This document has been electronically signed. Oct 28 2024  7:41AM    < end of copied text >    < from: CT Head No Cont (10.27.24 @ 17:03) >ACC: [id number] EXAM:  CT BRAIN   ORDERED BY: JANET ROMERO /ACC: [id number] EXAM:  CT ANGIO NECK (W)AW IC   ORDERED BY: JANET ROMERO /ACC: [id number] EXAM:  CT ANGIO BRAIN (W)AW IC   ORDERED BY: JANET ROMERO -PROCEDURE DATE:  10/27/2024    INTERPRETATION:  EXAMINATION: CT ANGIO BRAIN, CT ANGIO NECK, CT HEAD    CLINICAL INDICATION: unsteady gait  TECHNIQUE: CT images of the head were obtained without contrast. Thin   section CT axial images of the head and neck vasculature were obtained   following IV injection of Omnipaque 350, 90 cc administered. Multiple 3D   and MIP reformats were created on a separate workstation and submitted   for review. Dose reduction techniques were utilized including but not   limited to automated exposure control(AEC), iterative reconstruction   technique, and/or mA and/or kV dose adjustments based on patient size.  COMPARISON: None.    FINDINGS:    There is a large mass in the sella and suprasellar cistern with solid and   cystic components. The lesion measures approximately 2.7 x 2.7 x 3.9 cm.   There is associated chiasmatic encroachment and invasion of both   cavernous sinuses.    The tumor is intimately associated with the [place], coming into   contact with both carotid arteries, the rightPCOM, the left A1 segment,   and both ACAs, which are deviated to the left. 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 Above the   posterior clinoid process, the tumor is 3 mm from the basilar artery.    Thereis depression and erosion of the floor of the sella, with suspected   invasion of the sphenoid sinuses. The right sphenoid sinus is completely   opacified, and the right sphenoethmoidal recess is expanded.    The appearance is nonspecific, but suspicious for a pituitary adenoma   until proven otherwise. Meningioma is also in the differential diagnosis.   Neurosurgical consultation is recommended. This could be characterized   more definitively with a dedicated pituitary MRI.    Moderate generalized cerebral volume loss, with distention of the sulci   and concomitant ex-vacuo ventricular dilatation. Moderate nonspecific low   attenuation in the periventricular and subcortical white matter, likely   due to small vessel disease.    No acute intracranial hemorrhage. No midline shift or herniation. No CT   evidence of acute territorial infarction, although MRI with DWI would be   more sensitive.    A large retention cyst is noted in the right maxillary sinus. There are   no air-fluid levels. The mastoids are clear. Limited views of the orbits   and visualized soft tissues of the neck, face, scalp, skull base, and   calvarium are otherwise unremarkable.    Calcified plaque without significant narrowing at the great vessel   origins and bothcarotid bifurcations.    On the right, the ICA, ECA, CCA, and brachiocephalic artery are patent.   On the left, the ICA, ECA, CCA are patent. The vertebrals and subclavian   arteries are patent. No arterial dissection or ulcerated plaque.    Calcified plaque without significant narrowing at both carotid siphons.    No intracranial aneurysms or large vessel occlusions. No large feeding   arteries or draining veins. The ACAs, MCAs, PCAs, and carotid siphons are   otherwise negative. The basilar artery and V4 vertebral segments are   otherwise negative. Abnormalities of  vessels and distal   intracranial branches are beyond the resolution of this technique, and   catheter angiography would be more sensitive.    The dural venous sinusesare grossly patent, although this examination   wasn't optimized to evaluate the cerebral veins. There is moderate   compression of both internal jugular veins by the styloid processes at   the skull base.    There is a vascular lesion dorsal to the right lobe of the thyroid gland,   measuring 12 x 11 x 18 mm. This could represent an exophytic thyroid   nodule or a parathyroid adenoma.    Degenerative changes in the cervical spine, with osteophytic left C6-7   foraminal encroachment. No gross evidence of an acute fracture, although   this examination wasn't optimized to evaluate the spine.    IMPRESSION:    1.  Large pituitary tumor with chiasmatic encroachment, consider   pituitary MRI.  2.  Invasion of the cavernous sinuses and sphenoid sinuses.  3.  Incidental vascular lesion dorsal to the right thyroid.    Patient Name: SAI HENDRIX  MRN: [id number], : 60    --- End of Report ---    ASHLEY LOWRY MD; Attending Radiologist  This document has been electronically signed. Oct 27 2024  6:02PM    < end of copied text >    LABS:                        11.4   4.97  )-----------( 180      ( 2024 05:48 )             34.9           139  |  102  |  21.1[H]  ----------------------------<  153[H]  3.8   |  23.0  |  0.90    Ca    8.5      2024 05:48  Phos  3.7       Mg     2.0           Urinalysis Basic - ( 2024 05:48 )  Color: x / Appearance: x / SG: x / pH: x  Gluc: 153 mg/dL / Ketone: x  / Bili: x / Urobili: x   Blood: x / Protein: x / Nitrite: x   Leuk Esterase: x / RBC: x / WBC x   Sq Epi: x / Non Sq Epi: x / Bacteria: x                  CAPILLARY BLOOD GLUCOSE      POCT Blood Glucose.: 167 mg/dL (2024 08:55)

## 2024-11-01 NOTE — PROGRESS NOTE ADULT - SUBJECTIVE AND OBJECTIVE BOX
[facility] Division of Hospital Medicine    Chief Complaint:  Pituitary Tumor     SUBJECTIVE / OVERNIGHT EVENTS:   Pt reports no complaints     Patient denies chest pain, SOB, abd pain, N/V, fever, chills, dysuria or any other complaints. All remainder ROS negative.     MEDICATIONS  (STANDING):  atorvastatin 20 milliGRAM(s) Oral at bedtime  dextrose 5%. 1000 milliLiter(s) (100 mL/Hr) IV Continuous <Continuous>  dextrose 5%. 1000 milliLiter(s) (50 mL/Hr) IV Continuous <Continuous>  dextrose 50% Injectable 12.5 Gram(s) IV Push once  dextrose 50% Injectable 25 Gram(s) IV Push once  dextrose 50% Injectable 25 Gram(s) IV Push once  glucagon  Injectable 1 milliGRAM(s) IntraMuscular once  influenza   Vaccine 0.5 milliLiter(s) IntraMuscular once  insulin glargine Injectable (LANTUS) 4 Unit(s) SubCutaneous at bedtime  insulin lispro (ADMELOG) corrective regimen sliding scale   SubCutaneous three times a day before meals  insulin lispro (ADMELOG) corrective regimen sliding scale   SubCutaneous at bedtime  insulin lispro Injectable (ADMELOG) 4 Unit(s) SubCutaneous three times a day before meals  losartan 50 milliGRAM(s) Oral daily  metoprolol succinate ER 50 milliGRAM(s) Oral two times a day  multivitamin 1 Tablet(s) Oral daily  NIFEdipine XL 60 milliGRAM(s) Oral daily  pantoprazole    Tablet 40 milliGRAM(s) Oral before breakfast  polyethylene glycol 3350 17 Gram(s) Oral daily  senna 2 Tablet(s) Oral at bedtime  sodium chloride 0.9% lock flush 3 milliLiter(s) IV Push every 8 hours    MEDICATIONS  (PRN):  acetaminophen     Tablet .. 650 milliGRAM(s) Oral every 6 hours PRN Moderate Pain (4 - 6)  dextrose Oral Gel 15 Gram(s) Oral once PRN Blood Glucose LESS THAN 70 milliGRAM(s)/deciliter  oxyCODONE    IR 5 milliGRAM(s) Oral every 6 hours PRN Severe Pain (7 - 10)        I&O's Summary    31 Oct 2024 07:01  -  01 Nov 2024 07:00  --------------------------------------------------------  IN: 960 mL / OUT: 680 mL / NET: 280 mL        PHYSICAL EXAM:  Vital Signs Last 24 Hrs  T(C): 37 (01 Nov 2024 13:00), Max: 37.1 (31 Oct 2024 16:43)  T(F): 98.6 (01 Nov 2024 13:00), Max: 98.7 (31 Oct 2024 16:43)  HR: 56 (01 Nov 2024 13:00) (54 - 63)  BP: 121/75 (01 Nov 2024 13:00) (105/62 - 145/81)  BP(mean): --  RR: 18 (01 Nov 2024 13:00) (18 - 18)  SpO2: 96% (01 Nov 2024 13:00) (94% - 96%)    Parameters below as of 01 Nov 2024 13:00  Patient On (Oxygen Delivery Method): room air        CONSTITUTIONAL: Awake, alert and in no apparent distress  CARDIAC: Normal rate, regular rhythm.  Heart sounds S1, S2.    RESPIRATORY: Breath sounds clear and equal bilaterally.   ABDOMINAL: Nontender to palpation. No rebound/ guarding   EXTREMITIES: No edema, cyanosis or deformity   NEUROLOGICAL: Alert and oriented, no focal deficits       LABS:                        11.4   4.97  )-----------( 180      ( 01 Nov 2024 05:48 )             34.9     11-01    139  |  102  |  21.1[H]  ----------------------------<  153[H]  3.8   |  23.0  |  0.90    Ca    8.5      01 Nov 2024 05:48  Phos  3.7     11-01  Mg     2.0     11-01            Urinalysis Basic - ( 01 Nov 2024 05:48 )    Color: x / Appearance: x / SG: x / pH: x  Gluc: 153 mg/dL / Ketone: x  / Bili: x / Urobili: x   Blood: x / Protein: x / Nitrite: x   Leuk Esterase: x / RBC: x / WBC x   Sq Epi: x / Non Sq Epi: x / Bacteria: x        CAPILLARY BLOOD GLUCOSE      POCT Blood Glucose.: 249 mg/dL (01 Nov 2024 11:55)  POCT Blood Glucose.: 167 mg/dL (01 Nov 2024 08:55)  POCT Blood Glucose.: 189 mg/dL (31 Oct 2024 21:37)  POCT Blood Glucose.: 208 mg/dL (31 Oct 2024 16:53)

## 2024-11-01 NOTE — PROGRESS NOTE ADULT - ASSESSMENT
63M with PMH of DM, HTN, HLD, GERD, on ASA81 daily, known history of pituitary tumor diagnosed 8 years ago in [facility] with no follow up since then, presents with generalized weakness x 1 day and found to have large pituitary mass extensively   elevates and distorts the optic chiasm and forebrain, invades the sphenoid sinus to the right of midline and likely invades the right cavernous sinus.    1. Pituitary macroadenoma with optic chiasm compression  - Hormonal workup normal --> non functioning pituitary macroadenoma  - Surgical plan as per neurosurgery, possibly OR next week    2. T2DM, a1c 7.1%- glucoses improving  - Continue admelog 4 units TID  - Continue lantus 4 units qhs  - Continue correction scale    3. HLD  - Continue statin    4. Abnormal EKG,  Cardiac CTA done: Minimal non-obstructive CAD  - Cont BP meds, statin  - Glycemic control    5. GERD  - Continue protonix 63M with PMH of DM, HTN, HLD, GERD, on ASA81 daily, known history of pituitary tumor diagnosed 8 years ago in [facility] with no follow up since then, presents with generalized weakness x 1 day and found to have large pituitary mass extensively   elevates and distorts the optic chiasm and forebrain, invades the sphenoid sinus to the right of midline and likely invades the right cavernous sinus.    1. Pituitary macroadenoma with optic chiasm compression  - Hormonal workup normal --> non functioning pituitary macroadenoma  - Surgical plan as per neurosurgery, possibly OR next week    2. T2DM, a1c 7.1%- glucoses improving  - Continue admelog 4 units TID  - Continue lantus 4 units qhs  - Continue correction scale    3. HLD  - Continue statin    4. Abnormal EKG,  Cardiac CTA done: Minimal non-obstructive CAD  - Cont BP meds, statin  - Glycemic control    5. GERD - not relieved with current PPI  - Continue protonix, consider GI eval 63M with PMH of DM, HTN, HLD, GERD, on ASA81 daily, known history of pituitary tumor diagnosed 8 years ago in [facility] with no follow up since then, presents with generalized weakness x 1 day and found to have large pituitary mass extensively   elevates and distorts the optic chiasm and forebrain, invades the sphenoid sinus to the right of midline and likely invades the right cavernous sinus.    1. Pituitary macroadenoma with optic chiasm compression  - Hormonal workup normal --> non functioning pituitary macroadenoma  - Surgical plan as per neurosurgery, possibly OR next week    2. T2DM, a1c 7.1%- glucoses improving  - Continue admelog 4 units TID  - Continue lantus 4 units qhs  - Continue correction scale  - will follow    3. HLD  - Continue statin    4. Abnormal EKG,  Cardiac CTA done: Minimal non-obstructive CAD  - Cont BP meds, statin  - Glycemic control    5. GERD - not relieved with current PPI  - Continue protonix, consider GI evaluation

## 2024-11-01 NOTE — PROGRESS NOTE ADULT - ASSESSMENT
Assessment: 63M with PMH of DM, HTN, HLD, GERD, on ASA81 daily, known history of pituitary tumor diagnosed 8 years ago in [place] with no follow up since then, presents with generalized weakness x 1 day. Neurosurgery admitting for 2.7 x 2.7 x 3.9 cm pituitary mass with chiasmatic encroachment.    Plan:  - Q4 neuro checks  - All imaging reviewed: CTH and MRI Brain shows 2.7 x 2.7 x 3.9 cm pituitary mass with chiasmatic encroachment  - Endocrine recs appreciated; endocrinology following and hormonal work-up PM&R  - Cardiology consult, w/u, recs and clearance appreciated; patient optimized and cleared by cardiology on 10/28/24  - SBP goal <160  - Resume home HTN/HLD meds  - Medicine consulted and cleared patient for OR next week.  Per medicine team - patient is medically optimized for OR next week.   - Hormonal work-up WNL per endocrinology   - Hold ASA  - Bowel Regimen: Senna and Miralax  - DVT ppx: SCDs only  - Neuro-ophthalmic evaluation completed, however, eval may need to be completed again if peripheral visual fields were not completed - this is being following up by Nsx supervisor.      Patient and plan to be discussed with Dr. Harmon. Patient planned for OR next week with Dr. Harmon.

## 2024-11-01 NOTE — PROGRESS NOTE ADULT - SUBJECTIVE AND OBJECTIVE BOX
INTERVAL EVENTS:  Follow up pituitary macroadenoma. Insulin doses adjusted, glucoses improving. Pt denies acute complaints, endorses good appetite.     MEDICATIONS  (STANDING):  atorvastatin 20 milliGRAM(s) Oral at bedtime  dextrose 5%. 1000 milliLiter(s) (100 mL/Hr) IV Continuous <Continuous>  dextrose 5%. 1000 milliLiter(s) (50 mL/Hr) IV Continuous <Continuous>  dextrose 50% Injectable 12.5 Gram(s) IV Push once  dextrose 50% Injectable 25 Gram(s) IV Push once  dextrose 50% Injectable 25 Gram(s) IV Push once  glucagon  Injectable 1 milliGRAM(s) IntraMuscular once  influenza   Vaccine 0.5 milliLiter(s) IntraMuscular once  insulin glargine Injectable (LANTUS) 4 Unit(s) SubCutaneous at bedtime  insulin lispro (ADMELOG) corrective regimen sliding scale   SubCutaneous at bedtime  insulin lispro (ADMELOG) corrective regimen sliding scale   SubCutaneous three times a day before meals  insulin lispro Injectable (ADMELOG) 4 Unit(s) SubCutaneous three times a day before meals  losartan 50 milliGRAM(s) Oral daily  metoprolol succinate ER 50 milliGRAM(s) Oral two times a day  multivitamin 1 Tablet(s) Oral daily  NIFEdipine XL 60 milliGRAM(s) Oral daily  pantoprazole    Tablet 40 milliGRAM(s) Oral before breakfast  polyethylene glycol 3350 17 Gram(s) Oral daily  potassium chloride    Tablet ER 20 milliEquivalent(s) Oral once  senna 2 Tablet(s) Oral at bedtime  sodium chloride 0.9% lock flush 3 milliLiter(s) IV Push every 8 hours    MEDICATIONS  (PRN):  acetaminophen     Tablet .. 650 milliGRAM(s) Oral every 6 hours PRN Moderate Pain (4 - 6)  dextrose Oral Gel 15 Gram(s) Oral once PRN Blood Glucose LESS THAN 70 milliGRAM(s)/deciliter  oxyCODONE    IR 5 milliGRAM(s) Oral every 6 hours PRN Severe Pain (7 - 10)    Allergies  No Known Allergies    Vital Signs Last 24 Hrs  T(C): 36.7 (01 Nov 2024 08:14), Max: 37.1 (31 Oct 2024 16:43)  T(F): 98.1 (01 Nov 2024 08:14), Max: 98.7 (31 Oct 2024 16:43)  HR: 54 (01 Nov 2024 08:14) (54 - 63)  BP: 105/62 (01 Nov 2024 08:14) (100/63 - 145/81)  BP(mean): --  RR: 18 (01 Nov 2024 08:14) (16 - 18)  SpO2: 96% (01 Nov 2024 08:14) (94% - 99%)    Parameters below as of 01 Nov 2024 08:14  Patient On (Oxygen Delivery Method): room air    PHYSICAL EXAM:  General: No apparent distress  Respiratory: Lungs clear bilaterally  Cardiac: +S1, S2, no m/r/g  GI: +BS, soft, non tender, non distended  Extremities: No peripheral edema  Neuro: A+O X3    LABS:                        11.4   4.97  )-----------( 180      ( 01 Nov 2024 05:48 )             34.9     11-01    139  |  102  |  21.1[H]  ----------------------------<  153[H]  3.8   |  23.0  |  0.90    Ca    8.5      01 Nov 2024 05:48  Phos  3.7     11-01  Mg     2.0     11-01      Urinalysis Basic - ( 01 Nov 2024 05:48 )    Color: x / Appearance: x / SG: x / pH: x  Gluc: 153 mg/dL / Ketone: x  / Bili: x / Urobili: x   Blood: x / Protein: x / Nitrite: x   Leuk Esterase: x / RBC: x / WBC x   Sq Epi: x / Non Sq Epi: x / Bacteria: x    POCT Blood Glucose.: 167 mg/dL (11-01-24 @ 08:55)  POCT Blood Glucose.: 189 mg/dL (10-31-24 @ 21:37)  POCT Blood Glucose.: 208 mg/dL (10-31-24 @ 16:53)  POCT Blood Glucose.: 214 mg/dL (10-31-24 @ 13:03)    Free Thyroxine, Serum: 1.1 ng/dL (10-30-24 @ 05:00)  Thyroid Stimulating Hormone, Serum: 0.60 uIU/mL (10-29-24 @ 05:12)  Free Thyroxine, Serum: 1.0 ng/dL (10-29-24 @ 05:12)  Thyroid Stimulating Hormone, Serum: 0.38 uIU/mL (10-28-24 @ 00:47)   INTERVAL EVENTS:  Follow up pituitary macroadenoma. Insulin doses adjusted, glucoses improving. Pt denies acute complaints, endorses good appetite.     MEDICATIONS  (STANDING):  atorvastatin 20 milliGRAM(s) Oral at bedtime  dextrose 5%. 1000 milliLiter(s) (100 mL/Hr) IV Continuous <Continuous>  dextrose 5%. 1000 milliLiter(s) (50 mL/Hr) IV Continuous <Continuous>  dextrose 50% Injectable 12.5 Gram(s) IV Push once  dextrose 50% Injectable 25 Gram(s) IV Push once  dextrose 50% Injectable 25 Gram(s) IV Push once  glucagon  Injectable 1 milliGRAM(s) IntraMuscular once  influenza   Vaccine 0.5 milliLiter(s) IntraMuscular once  insulin glargine Injectable (LANTUS) 4 Unit(s) SubCutaneous at bedtime  insulin lispro (ADMELOG) corrective regimen sliding scale   SubCutaneous at bedtime  insulin lispro (ADMELOG) corrective regimen sliding scale   SubCutaneous three times a day before meals  insulin lispro Injectable (ADMELOG) 4 Unit(s) SubCutaneous three times a day before meals  losartan 50 milliGRAM(s) Oral daily  metoprolol succinate ER 50 milliGRAM(s) Oral two times a day  multivitamin 1 Tablet(s) Oral daily  NIFEdipine XL 60 milliGRAM(s) Oral daily  pantoprazole    Tablet 40 milliGRAM(s) Oral before breakfast  polyethylene glycol 3350 17 Gram(s) Oral daily  potassium chloride    Tablet ER 20 milliEquivalent(s) Oral once  senna 2 Tablet(s) Oral at bedtime  sodium chloride 0.9% lock flush 3 milliLiter(s) IV Push every 8 hours    MEDICATIONS  (PRN):  acetaminophen     Tablet .. 650 milliGRAM(s) Oral every 6 hours PRN Moderate Pain (4 - 6)  dextrose Oral Gel 15 Gram(s) Oral once PRN Blood Glucose LESS THAN 70 milliGRAM(s)/deciliter  oxyCODONE    IR 5 milliGRAM(s) Oral every 6 hours PRN Severe Pain (7 - 10)    Allergies  No Known Allergies    Vital Signs Last 24 Hrs  T(C): 36.7 (01 Nov 2024 08:14), Max: 37.1 (31 Oct 2024 16:43)  T(F): 98.1 (01 Nov 2024 08:14), Max: 98.7 (31 Oct 2024 16:43)  HR: 54 (01 Nov 2024 08:14) (54 - 63)  BP: 105/62 (01 Nov 2024 08:14) (100/63 - 145/81)  BP(mean): --  RR: 18 (01 Nov 2024 08:14) (16 - 18)  SpO2: 96% (01 Nov 2024 08:14) (94% - 99%)    Parameters below as of 01 Nov 2024 08:14  Patient On (Oxygen Delivery Method): room air    PHYSICAL EXAM:  General: No apparent distress  Respiratory: Lungs clear bilaterally  Cardiac: +S1, S2, no m/r/g  GI: +BS, soft, non tender, non distended  Extremities: No peripheral edema  Neuro: A+O X3    LABS:                        11.4   4.97  )-----------( 180      ( 01 Nov 2024 05:48 )             34.9     11-01    139  |  102  |  21.1[H]  ----------------------------<  153[H]  3.8   |  23.0  |  0.90    Ca    8.5      01 Nov 2024 05:48  Phos  3.7     11-01  Mg     2.0     11-01    POCT Blood Glucose.: 167 mg/dL (11-01-24 @ 08:55)  POCT Blood Glucose.: 189 mg/dL (10-31-24 @ 21:37)  POCT Blood Glucose.: 208 mg/dL (10-31-24 @ 16:53)  POCT Blood Glucose.: 214 mg/dL (10-31-24 @ 13:03)    Free Thyroxine, Serum: 1.1 ng/dL (10-30-24 @ 05:00)  Thyroid Stimulating Hormone, Serum: 0.60 uIU/mL (10-29-24 @ 05:12)  Free Thyroxine, Serum: 1.0 ng/dL (10-29-24 @ 05:12)  Thyroid Stimulating Hormone, Serum: 0.38 uIU/mL (10-28-24 @ 00:47)

## 2024-11-01 NOTE — PROGRESS NOTE ADULT - ASSESSMENT
63 M with PMH of DM, HTN, HLD, GERD, on ASA81 daily, known history of pituitary tumor diagnosed 8 years ago in [facility] with no follow up since then, presents with generalized weakness x 1 day. Patient reports difficulty getting out of his chair with unsteady gait since this morning. Patient currently c/o mild headache and burning with urination. Denies any recent falls/trauma, vision changes, dizziness, nausea/vomiting, numbness, or tingling. 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 Pt also noted to have TWI on EKG, cards consulted and further w/u pending. Pt reportedly claims to be compliant on home cardiac medications. Pt admitted to neurosurgery for likely surgical resection of pituitary tumor with Dr. Harmon.     Pituitary tumor   - MRI brain reviewed: Large pituitary tumor, likely adenoma, extensively elevates and distorts the optic chiasm and forebrain, invades the sphenoid sinus to the right of midline and likely invades the right cavernous sinus.  - Neuro checks   - NSG plan for surgical resection this admission, likely next week   - Neuro ophtho eval done 10/31 per NSG   - SBP goal <160  - Hormonal evaluation per Endocrine   - Pt is medically optimized for Surgery     Abnormal EKG   - TTE reviewed   - Cardiac CTA done: Minimal non-obstructive CAD   - Cardio on board     DM   - A1C 7.2   - c/w Insulin and ISS  - BG monitoring     HTN  HLD   - c/w Toprol XL, Losartan, Nifedipine  - c/w statin     DVT ppx - SCDs

## 2024-11-02 LAB
ANION GAP SERPL CALC-SCNC: 8 MMOL/L — SIGNIFICANT CHANGE UP (ref 5–17)
BUN SERPL-MCNC: 19.1 MG/DL — SIGNIFICANT CHANGE UP (ref 8–20)
CALCIUM SERPL-MCNC: 9 MG/DL — SIGNIFICANT CHANGE UP (ref 8.4–10.5)
CHLORIDE SERPL-SCNC: 102 MMOL/L — SIGNIFICANT CHANGE UP (ref 96–108)
CO2 SERPL-SCNC: 27 MMOL/L — SIGNIFICANT CHANGE UP (ref 22–29)
CREAT SERPL-MCNC: 1.11 MG/DL — SIGNIFICANT CHANGE UP (ref 0.5–1.3)
EGFR: 75 ML/MIN/1.73M2 — SIGNIFICANT CHANGE UP
GLUCOSE BLDC GLUCOMTR-MCNC: 163 MG/DL — HIGH (ref 70–99)
GLUCOSE BLDC GLUCOMTR-MCNC: 165 MG/DL — HIGH (ref 70–99)
GLUCOSE BLDC GLUCOMTR-MCNC: 195 MG/DL — HIGH (ref 70–99)
GLUCOSE BLDC GLUCOMTR-MCNC: 206 MG/DL — HIGH (ref 70–99)
GLUCOSE SERPL-MCNC: 173 MG/DL — HIGH (ref 70–99)
HCT VFR BLD CALC: 32 % — LOW (ref 39–50)
HGB BLD-MCNC: 10.4 G/DL — LOW (ref 13–17)
MAGNESIUM SERPL-MCNC: 2 MG/DL — SIGNIFICANT CHANGE UP (ref 1.6–2.6)
MCHC RBC-ENTMCNC: 27.8 PG — SIGNIFICANT CHANGE UP (ref 27–34)
MCHC RBC-ENTMCNC: 32.5 G/DL — SIGNIFICANT CHANGE UP (ref 32–36)
MCV RBC AUTO: 85.6 FL — SIGNIFICANT CHANGE UP (ref 80–100)
PHOSPHATE SERPL-MCNC: 3.4 MG/DL — SIGNIFICANT CHANGE UP (ref 2.4–4.7)
PLATELET # BLD AUTO: 189 K/UL — SIGNIFICANT CHANGE UP (ref 150–400)
POTASSIUM SERPL-MCNC: 4.2 MMOL/L — SIGNIFICANT CHANGE UP (ref 3.5–5.3)
POTASSIUM SERPL-SCNC: 4.2 MMOL/L — SIGNIFICANT CHANGE UP (ref 3.5–5.3)
RBC # BLD: 3.74 M/UL — LOW (ref 4.2–5.8)
RBC # FLD: 11.5 % — SIGNIFICANT CHANGE UP (ref 10.3–14.5)
SODIUM SERPL-SCNC: 137 MMOL/L — SIGNIFICANT CHANGE UP (ref 135–145)
WBC # BLD: 5.47 K/UL — SIGNIFICANT CHANGE UP (ref 3.8–10.5)
WBC # FLD AUTO: 5.47 K/UL — SIGNIFICANT CHANGE UP (ref 3.8–10.5)

## 2024-11-02 PROCEDURE — 93970 EXTREMITY STUDY: CPT | Mod: 26

## 2024-11-02 PROCEDURE — 99231 SBSQ HOSP IP/OBS SF/LOW 25: CPT

## 2024-11-02 PROCEDURE — 99232 SBSQ HOSP IP/OBS MODERATE 35: CPT

## 2024-11-02 RX ORDER — ENOXAPARIN SODIUM 40MG/0.4ML
40 SYRINGE (ML) SUBCUTANEOUS EVERY 24 HOURS
Refills: 0 | Status: DISCONTINUED | OUTPATIENT
Start: 2024-11-02 | End: 2024-11-05

## 2024-11-02 RX ADMIN — PANTOPRAZOLE SODIUM 40 MILLIGRAM(S): 40 TABLET, DELAYED RELEASE ORAL at 05:45

## 2024-11-02 RX ADMIN — Medication 2 TABLET(S): at 21:56

## 2024-11-02 RX ADMIN — Medication 2: at 17:26

## 2024-11-02 RX ADMIN — Medication 4 UNIT(S): at 08:04

## 2024-11-02 RX ADMIN — Medication 2: at 08:04

## 2024-11-02 RX ADMIN — Medication 4: at 11:52

## 2024-11-02 RX ADMIN — Medication 60 MILLIGRAM(S): at 05:45

## 2024-11-02 RX ADMIN — Medication 20 MILLIGRAM(S): at 21:56

## 2024-11-02 RX ADMIN — Medication 1 TABLET(S): at 11:54

## 2024-11-02 RX ADMIN — Medication 4 UNIT(S): at 11:53

## 2024-11-02 RX ADMIN — Medication 4 UNIT(S): at 21:56

## 2024-11-02 RX ADMIN — SODIUM CHLORIDE 3 MILLILITER(S): 9 INJECTION, SOLUTION INTRAMUSCULAR; INTRAVENOUS; SUBCUTANEOUS at 13:04

## 2024-11-02 RX ADMIN — Medication 40 MILLIGRAM(S): at 21:56

## 2024-11-02 RX ADMIN — Medication 50 MILLIGRAM(S): at 05:45

## 2024-11-02 RX ADMIN — Medication 4 UNIT(S): at 17:27

## 2024-11-02 RX ADMIN — Medication 50 MILLIGRAM(S): at 17:26

## 2024-11-02 RX ADMIN — LOSARTAN POTASSIUM 50 MILLIGRAM(S): 25 TABLET ORAL at 05:45

## 2024-11-02 NOTE — PROGRESS NOTE ADULT - SUBJECTIVE AND OBJECTIVE BOX
NEUROSURGERY PROGRESS NOTE:    HPI: 63M with PMH of DM, HTN, HLD, GERD, on ASA81 daily, known history of pituitary tumor diagnosed 8 years ago in [facility] with no follow up since then, presents with generalized weakness x 1 day. Patient reports difficulty getting out of his chair with unsteady gait since this morning. Patient currently c/o mild headache and burning with urination. Denies any recent falls/trauma, vision changes, dizziness, nausea/vomiting, numbness, or tingling. 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 Pt also noted to have TWI on EKG, cards consulted and further w/u pending. Pt reportedly claims to be compliant on home cardiac medications. Pt admitted to neurosurgery for likely surgical resection of pituitary tumor with Dr. Harmon.  10/31: Patient transferred to [facility] for neuro-ophthalmic evaluation with Dr. Chua. Reports reviewed by neurosurgery team, however, it was noted the peripheral fields are not documented. Nsx supervisor reached out to [facility] neuro-ophthalmic team to ascertain full report.  If peripheral visual fields were not completed, patient will likely need to be transferred back to [facility] for repeat neuro-ophthalmic eval prior to tentative OR next Wednesday with Dr. Harmon.      INTERVAL HPI/OVERNIGHT EVENTS:  pt seen and states is at baseline, denied any new or worsening sensorimotor changes  -headache    - Nausea /- Vomiting  denied new or worsening visual symptoms       MEDICATIONS  (STANDING):  atorvastatin 20 milliGRAM(s) Oral at bedtime  dextrose 5%. 1000 milliLiter(s) (100 mL/Hr) IV Continuous <Continuous>  dextrose 5%. 1000 milliLiter(s) (50 mL/Hr) IV Continuous <Continuous>  dextrose 50% Injectable 12.5 Gram(s) IV Push once  dextrose 50% Injectable 25 Gram(s) IV Push once  dextrose 50% Injectable 25 Gram(s) IV Push once  glucagon  Injectable 1 milliGRAM(s) IntraMuscular once  influenza   Vaccine 0.5 milliLiter(s) IntraMuscular once  insulin glargine Injectable (LANTUS) 4 Unit(s) SubCutaneous at bedtime  insulin lispro (ADMELOG) corrective regimen sliding scale   SubCutaneous three times a day before meals  insulin lispro (ADMELOG) corrective regimen sliding scale   SubCutaneous at bedtime  insulin lispro Injectable (ADMELOG) 4 Unit(s) SubCutaneous three times a day before meals  losartan 50 milliGRAM(s) Oral daily  metoprolol succinate ER 50 milliGRAM(s) Oral two times a day  multivitamin 1 Tablet(s) Oral daily  NIFEdipine XL 60 milliGRAM(s) Oral daily  pantoprazole    Tablet 40 milliGRAM(s) Oral before breakfast  polyethylene glycol 3350 17 Gram(s) Oral daily  senna 2 Tablet(s) Oral at bedtime  sodium chloride 0.9% lock flush 3 milliLiter(s) IV Push every 8 hours    MEDICATIONS  (PRN):  acetaminophen     Tablet .. 650 milliGRAM(s) Oral every 6 hours PRN Moderate Pain (4 - 6)  dextrose Oral Gel 15 Gram(s) Oral once PRN Blood Glucose LESS THAN 70 milliGRAM(s)/deciliter  oxyCODONE    IR 5 milliGRAM(s) Oral every 6 hours PRN Severe Pain (7 - 10)    Allergies  No Known Allergies    Intolerances      Vital Signs Last 24 Hrs  T(C): 36.7 (02 Nov 2024 08:48), Max: 37.1 (01 Nov 2024 15:31)  T(F): 98.1 (02 Nov 2024 08:48), Max: 98.8 (02 Nov 2024 04:20)  HR: 60 (02 Nov 2024 08:48) (51 - 70)  BP: 126/83 (02 Nov 2024 08:48) (104/63 - 145/91)  BP(mean): --  RR: 18 (02 Nov 2024 08:48) (18 - 18)  SpO2: 97% (02 Nov 2024 08:48) (96% - 97%)    Parameters below as of 02 Nov 2024 08:48  Patient On (Oxygen Delivery Method): room air        PHYSICAL EXAM:  General: NAD, NCAT, resting comfortably in bed  Cardio: NSR on monitor   Lungs: respirations unlabored on RA  Neuro: AOX4, speech is clear & fluent without dysarthria and aphasia, MONTERO AG 5/5 throughout, sensation and strength intact distally and proximally, no pronator drift or facial droop appreciated, PERRL, EOMI without nystagmus, visual fields intact on exam, no focal deficits appreciated   GCS: 15 (E4 / V5 / M6)      LABS:                        10.4   5.47  )-----------( 189      ( 02 Nov 2024 06:21 )             32.0     11-02    137  |  102  |  19.1  ----------------------------<  173[H]  4.2   |  27.0  |  1.11    Ca    9.0      02 Nov 2024 06:21  Phos  3.4     11-02  Mg     2.0     11-02        Urinalysis Basic - ( 02 Nov 2024 06:21 )    Color: x / Appearance: x / SG: x / pH: x  Gluc: 173 mg/dL / Ketone: x  / Bili: x / Urobili: x   Blood: x / Protein: x / Nitrite: x   Leuk Esterase: x / RBC: x / WBC x   Sq Epi: x / Non Sq Epi: x / Bacteria: x      RADIOLOGY & ADDITIONAL TESTS:  no new NSx images available for review     < from: MR Sella alone w/wo IV Cont (10.28.24 @ 07:26) >  IMPRESSION:  1. PITUITARY:   Large pituitary tumor, likely adenoma, extensively elevates and distorts the optic chiasm and forebrain, invades the sphenoid sinus to the right of midline and likely invades the right cavernous sinus.  2. BRAIN:   Ischemic white matter disease greater than typical for age. Diffuse brain volume loss typical for age.  --- End ofReport ---  JENNIFER GONZALEZ MD; Attending Radiologist  This document has been electronically signed. Oct 28 2024  7:41AM  < end of copied text >

## 2024-11-02 NOTE — PROGRESS NOTE ADULT - SUBJECTIVE AND OBJECTIVE BOX
[facility] Division of Hospital Medicine  Eddie Moreau MD, LUKASZ  I'm reachable on [employer] Teams    Patient is a 63y old  Male who presents with a chief complaint of Pituitary Tumor (01 Nov 2024 14:11)      SUBJECTIVE / OVERNIGHT EVENTS:  No events overnight. No acute complaints.     MEDICATIONS  (STANDING):  atorvastatin 20 milliGRAM(s) Oral at bedtime  dextrose 5%. 1000 milliLiter(s) (50 mL/Hr) IV Continuous <Continuous>  dextrose 5%. 1000 milliLiter(s) (100 mL/Hr) IV Continuous <Continuous>  dextrose 50% Injectable 12.5 Gram(s) IV Push once  dextrose 50% Injectable 25 Gram(s) IV Push once  dextrose 50% Injectable 25 Gram(s) IV Push once  glucagon  Injectable 1 milliGRAM(s) IntraMuscular once  influenza   Vaccine 0.5 milliLiter(s) IntraMuscular once  insulin glargine Injectable (LANTUS) 4 Unit(s) SubCutaneous at bedtime  insulin lispro (ADMELOG) corrective regimen sliding scale   SubCutaneous three times a day before meals  insulin lispro (ADMELOG) corrective regimen sliding scale   SubCutaneous at bedtime  insulin lispro Injectable (ADMELOG) 4 Unit(s) SubCutaneous three times a day before meals  losartan 50 milliGRAM(s) Oral daily  metoprolol succinate ER 50 milliGRAM(s) Oral two times a day  multivitamin 1 Tablet(s) Oral daily  NIFEdipine XL 60 milliGRAM(s) Oral daily  pantoprazole    Tablet 40 milliGRAM(s) Oral before breakfast  polyethylene glycol 3350 17 Gram(s) Oral daily  senna 2 Tablet(s) Oral at bedtime  sodium chloride 0.9% lock flush 3 milliLiter(s) IV Push every 8 hours    MEDICATIONS  (PRN):  acetaminophen     Tablet .. 650 milliGRAM(s) Oral every 6 hours PRN Moderate Pain (4 - 6)  dextrose Oral Gel 15 Gram(s) Oral once PRN Blood Glucose LESS THAN 70 milliGRAM(s)/deciliter  oxyCODONE    IR 5 milliGRAM(s) Oral every 6 hours PRN Severe Pain (7 - 10)    CAPILLARY BLOOD GLUCOSE      POCT Blood Glucose.: 206 mg/dL (02 Nov 2024 11:50)  POCT Blood Glucose.: 163 mg/dL (02 Nov 2024 08:03)  POCT Blood Glucose.: 180 mg/dL (01 Nov 2024 21:32)  POCT Blood Glucose.: 147 mg/dL (01 Nov 2024 17:55)    I&O's Summary    01 Nov 2024 07:01  -  02 Nov 2024 07:00  --------------------------------------------------------  IN: 600 mL / OUT: 700 mL / NET: -100 mL        PHYSICAL EXAM:  Vital Signs Last 24 Hrs  T(C): 36.7 (02 Nov 2024 08:48), Max: 37.1 (01 Nov 2024 15:31)  T(F): 98.1 (02 Nov 2024 08:48), Max: 98.8 (02 Nov 2024 04:20)  HR: 60 (02 Nov 2024 08:48) (51 - 70)  BP: 126/83 (02 Nov 2024 08:48) (104/63 - 145/91)  BP(mean): --  RR: 18 (02 Nov 2024 08:48) (18 - 18)  SpO2: 97% (02 Nov 2024 08:48) (96% - 97%)    Parameters below as of 02 Nov 2024 08:48  Patient On (Oxygen Delivery Method): room air    CONSTITUTIONAL: Awake, alert and in no apparent distress  CARDIAC: Normal rate, regular rhythm.  Heart sounds S1, S2.    RESPIRATORY: Breath sounds clear and equal bilaterally.   ABDOMINAL: Nontender to palpation. No rebound/ guarding   EXTREMITIES: No edema, cyanosis or deformity   NEUROLOGICAL: Alert and oriented, no focal deficits     LABS:                        10.4   5.47  )-----------( 189      ( 02 Nov 2024 06:21 )             32.0     11-02    137  |  102  |  19.1  ----------------------------<  173[H]  4.2   |  27.0  |  1.11    Ca    9.0      02 Nov 2024 06:21  Phos  3.4     11-02  Mg     2.0     11-02            Urinalysis Basic - ( 02 Nov 2024 06:21 )    Color: x / Appearance: x / SG: x / pH: x  Gluc: 173 mg/dL / Ketone: x  / Bili: x / Urobili: x   Blood: x / Protein: x / Nitrite: x   Leuk Esterase: x / RBC: x / WBC x   Sq Epi: x / Non Sq Epi: x / Bacteria: x

## 2024-11-02 NOTE — PROGRESS NOTE ADULT - ASSESSMENT
63M with PMH of DM, HTN, HLD, GERD, on ASA81 daily, known history of pituitary tumor diagnosed 8 years ago in [place] with no follow up since then, presents with generalized weakness x 1 day.   Neurosurgery admitted for 2.7 x 2.7 x 3.9 cm pituitary mass with chiasmatic encroachment as noted on MRI    Plan:  - Q4 neuro checks   - All imaging reviewed: CTH and MRI Brain shows 2.7 x 2.7 x 3.9 cm pituitary mass with chiasmatic encroachment  - Endocrine recs appreciated; endocrinology following and hormonal work-up PM&R  - Cardiology consult, w/u, recs and clearance appreciated; patient optimized and cleared by cardiology on 10/28/24  - SBP goal <160  - Resume home HTN/HLD meds  - Medicine consulted and cleared patient for OR next week.  Per medicine team - patient is medically optimized for OR next week, defer nonsurgical management to medical team/ case reviewed in detail w Dr Moreau  - Hormonal work-up WNL per endocrinology   - Hold ASA  - Bowel Regimen: Senna and Miralax  - DVT ppx: SCDs only  - Neuro-ophthalmic evaluation completed, however, eval may need to be completed again if peripheral visual fields were not completed - this is being following up by Nsx supervisor.      Patient and plan to be discussed with Dr. Harmon. Patient planned for OR next week with Dr. Harmon.    63M with PMH of DM, HTN, HLD, GERD, on ASA81 daily, known history of pituitary tumor diagnosed 8 years ago in [place] with no follow up since then, presents with generalized weakness x 1 day.   Neurosurgery admitted for 2.7 x 2.7 x 3.9 cm pituitary mass with chiasmatic encroachment as noted on MRI    Plan:  - Q4 neuro checks   - All imaging reviewed: CTH and MRI Brain shows 2.7 x 2.7 x 3.9 cm pituitary mass with chiasmatic encroachment  - Endocrine recs appreciated; endocrinology following and hormonal work-up PM&R  - Cardiology consult, w/u, recs and clearance appreciated; patient optimized and cleared by cardiology on 10/28/24  - SBP goal <160  - Resume home HTN/HLD meds  - Medicine consulted and cleared patient for OR next week.  Per medicine team - patient is medically optimized for OR next week, defer nonsurgical management to medical team/ case reviewed in detail w Dr Moreau  - Hormonal work-up WNL per endocrinology   - Hold ASA  - Bowel Regimen: Senna and Miralax  - DVT ppx: SCDs and will add SQL at 10pm daily as per d/w Dr Brunner  - Neuro-ophthalmic evaluation completed, however, eval may need to be completed again if peripheral visual fields were not completed - this is being following up by Nsx supervisor.      Patient and plan to be discussed with Dr. Harmon. Patient planned for OR next week with Dr. Harmon.

## 2024-11-02 NOTE — PROGRESS NOTE ADULT - ASSESSMENT
63 M with PMH of DM, HTN, HLD, GERD, on ASA81 daily, known history of pituitary tumor diagnosed 8 years ago in [facility] with no follow up since then, presents with generalized weakness x 1 day. Patient reports difficulty getting out of his chair with unsteady gait since this morning. Patient currently c/o mild headache and burning with urination. Denies any recent falls/trauma, vision changes, dizziness, nausea/vomiting, numbness, or tingling. 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 Pt also noted to have TWI on EKG, cards consulted and further w/u pending. Pt reportedly claims to be compliant on home cardiac medications. Pt admitted to neurosurgery for likely surgical resection of pituitary tumor with Dr. Harmon.     Pituitary tumor   - MRI brain reviewed: Large pituitary tumor, likely adenoma, extensively elevates and distorts the optic chiasm and forebrain, invades the sphenoid sinus to the right of midline and likely invades the right cavernous sinus.  - Neuro checks   - NSG plan for surgical resection this admission, likely on 11/6 Wednesday  - Neuro ophtho eval done 10/31 per NSG   - SBP goal <160  - Hormonal evaluation per Endocrine   - Pt is medically optimized for Surgery     Anemia, normocytic  - obtain B12, folate, iron studies    Abnormal EKG   - TTE reviewed   - Cardiac CTA done: Minimal non-obstructive CAD   - Cardio on board     DM   - A1C 7.2   - c/w Insulin and ISS  - BG monitoring     HTN  HLD   - c/w Toprol XL, Losartan, Nifedipine  - c/w statin     DVT ppx - SCDs     Dispo: pending surgery, remains medically active.

## 2024-11-03 LAB
ANION GAP SERPL CALC-SCNC: 12 MMOL/L — SIGNIFICANT CHANGE UP (ref 5–17)
BUN SERPL-MCNC: 16.6 MG/DL — SIGNIFICANT CHANGE UP (ref 8–20)
CALCIUM SERPL-MCNC: 9.2 MG/DL — SIGNIFICANT CHANGE UP (ref 8.4–10.5)
CHLORIDE SERPL-SCNC: 101 MMOL/L — SIGNIFICANT CHANGE UP (ref 96–108)
CO2 SERPL-SCNC: 25 MMOL/L — SIGNIFICANT CHANGE UP (ref 22–29)
CREAT SERPL-MCNC: 1.06 MG/DL — SIGNIFICANT CHANGE UP (ref 0.5–1.3)
EGFR: 79 ML/MIN/1.73M2 — SIGNIFICANT CHANGE UP
FERRITIN SERPL-MCNC: 420 NG/ML — HIGH (ref 30–400)
FOLATE SERPL-MCNC: 17.2 NG/ML — SIGNIFICANT CHANGE UP
GLUCOSE BLDC GLUCOMTR-MCNC: 152 MG/DL — HIGH (ref 70–99)
GLUCOSE BLDC GLUCOMTR-MCNC: 180 MG/DL — HIGH (ref 70–99)
GLUCOSE BLDC GLUCOMTR-MCNC: 228 MG/DL — HIGH (ref 70–99)
GLUCOSE BLDC GLUCOMTR-MCNC: 240 MG/DL — HIGH (ref 70–99)
GLUCOSE SERPL-MCNC: 163 MG/DL — HIGH (ref 70–99)
HCT VFR BLD CALC: 34.7 % — LOW (ref 39–50)
HGB BLD-MCNC: 11.5 G/DL — LOW (ref 13–17)
IRON SATN MFR SERPL: 20 % — SIGNIFICANT CHANGE UP (ref 16–55)
IRON SATN MFR SERPL: 65 UG/DL — SIGNIFICANT CHANGE UP (ref 59–158)
MAGNESIUM SERPL-MCNC: 2 MG/DL — SIGNIFICANT CHANGE UP (ref 1.8–2.6)
MCHC RBC-ENTMCNC: 28.4 PG — SIGNIFICANT CHANGE UP (ref 27–34)
MCHC RBC-ENTMCNC: 33.1 G/DL — SIGNIFICANT CHANGE UP (ref 32–36)
MCV RBC AUTO: 85.7 FL — SIGNIFICANT CHANGE UP (ref 80–100)
PHOSPHATE SERPL-MCNC: 3.5 MG/DL — SIGNIFICANT CHANGE UP (ref 2.4–4.7)
PLATELET # BLD AUTO: 225 K/UL — SIGNIFICANT CHANGE UP (ref 150–400)
POTASSIUM SERPL-MCNC: 4.1 MMOL/L — SIGNIFICANT CHANGE UP (ref 3.5–5.3)
POTASSIUM SERPL-SCNC: 4.1 MMOL/L — SIGNIFICANT CHANGE UP (ref 3.5–5.3)
RBC # BLD: 4.05 M/UL — LOW (ref 4.2–5.8)
RBC # FLD: 11.6 % — SIGNIFICANT CHANGE UP (ref 10.3–14.5)
SODIUM SERPL-SCNC: 138 MMOL/L — SIGNIFICANT CHANGE UP (ref 135–145)
TIBC SERPL-MCNC: 325 UG/DL — SIGNIFICANT CHANGE UP (ref 220–430)
TRANSFERRIN SERPL-MCNC: 227 MG/DL — SIGNIFICANT CHANGE UP (ref 180–329)
VIT B12 SERPL-MCNC: 376 PG/ML — SIGNIFICANT CHANGE UP (ref 232–1245)
WBC # BLD: 6.47 K/UL — SIGNIFICANT CHANGE UP (ref 3.8–10.5)
WBC # FLD AUTO: 6.47 K/UL — SIGNIFICANT CHANGE UP (ref 3.8–10.5)

## 2024-11-03 PROCEDURE — 99231 SBSQ HOSP IP/OBS SF/LOW 25: CPT

## 2024-11-03 PROCEDURE — 99232 SBSQ HOSP IP/OBS MODERATE 35: CPT

## 2024-11-03 RX ORDER — INSULIN LISPRO 100/ML
6 VIAL (ML) SUBCUTANEOUS
Refills: 0 | Status: DISCONTINUED | OUTPATIENT
Start: 2024-11-03 | End: 2024-11-04

## 2024-11-03 RX ORDER — INSULIN GLARGINE,HUM.REC.ANLOG 100/ML
6 VIAL (ML) SUBCUTANEOUS AT BEDTIME
Refills: 0 | Status: DISCONTINUED | OUTPATIENT
Start: 2024-11-03 | End: 2024-11-04

## 2024-11-03 RX ADMIN — Medication 6 UNIT(S): at 17:50

## 2024-11-03 RX ADMIN — Medication 4: at 12:59

## 2024-11-03 RX ADMIN — LOSARTAN POTASSIUM 50 MILLIGRAM(S): 25 TABLET ORAL at 05:54

## 2024-11-03 RX ADMIN — Medication 4 UNIT(S): at 09:07

## 2024-11-03 RX ADMIN — SODIUM CHLORIDE 3 MILLILITER(S): 9 INJECTION, SOLUTION INTRAMUSCULAR; INTRAVENOUS; SUBCUTANEOUS at 20:00

## 2024-11-03 RX ADMIN — SODIUM CHLORIDE 3 MILLILITER(S): 9 INJECTION, SOLUTION INTRAMUSCULAR; INTRAVENOUS; SUBCUTANEOUS at 05:14

## 2024-11-03 RX ADMIN — Medication 2: at 09:06

## 2024-11-03 RX ADMIN — Medication 4 UNIT(S): at 13:00

## 2024-11-03 RX ADMIN — Medication 1 TABLET(S): at 11:19

## 2024-11-03 RX ADMIN — Medication 6 UNIT(S): at 21:39

## 2024-11-03 RX ADMIN — SODIUM CHLORIDE 3 MILLILITER(S): 9 INJECTION, SOLUTION INTRAMUSCULAR; INTRAVENOUS; SUBCUTANEOUS at 14:00

## 2024-11-03 RX ADMIN — Medication 40 MILLIGRAM(S): at 21:43

## 2024-11-03 RX ADMIN — Medication 50 MILLIGRAM(S): at 05:54

## 2024-11-03 RX ADMIN — Medication 60 MILLIGRAM(S): at 05:54

## 2024-11-03 RX ADMIN — PANTOPRAZOLE SODIUM 40 MILLIGRAM(S): 40 TABLET, DELAYED RELEASE ORAL at 05:53

## 2024-11-03 RX ADMIN — Medication 2: at 17:49

## 2024-11-03 RX ADMIN — Medication 2 TABLET(S): at 21:43

## 2024-11-03 RX ADMIN — POLYETHYLENE GLYCOL 3350 17 GRAM(S): 17 POWDER, FOR SOLUTION ORAL at 11:19

## 2024-11-03 RX ADMIN — Medication 20 MILLIGRAM(S): at 21:43

## 2024-11-03 NOTE — PROGRESS NOTE ADULT - SUBJECTIVE AND OBJECTIVE BOX
NEUROSURGERY PROGRESS NOTE:    HPI: 63M with PMH of DM, HTN, HLD, GERD, on ASA81 daily, known history of pituitary tumor diagnosed 8 years ago in [facility] with no follow up since then, presents with generalized weakness x 1 day. Patient reports difficulty getting out of his chair with unsteady gait since this morning. Patient currently c/o mild headache and burning with urination. Denies any recent falls/trauma, vision changes, dizziness, nausea/vomiting, numbness, or tingling. 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 Pt also noted to have TWI on EKG, cards consulted and further w/u pending. Pt reportedly claims to be compliant on home cardiac medications. Pt admitted to neurosurgery for likely surgical resection of pituitary tumor with Dr. Harmon.  10/31: Patient transferred to [facility] for neuro-ophthalmic evaluation with Dr. Chua. Reports reviewed by neurosurgery team, however, it was noted the peripheral fields are not documented. Nsx supervisor reached out to [facility] neuro-ophthalmic team to ascertain full report.  If peripheral visual fields were not completed, patient will likely need to be transferred back to [facility] for repeat neuro-ophthalmic eval prior to tentative OR next Wednesday with Dr. Harmon.      INTERVAL HPI/OVERNIGHT EVENTS:  pt seen and states is at baseline, denied any new or worsening sensorimotor changes  -headache    - Nausea /- Vomiting  TMax: 99.2 and normotensive  denied new or worsening visual symptoms   tolerating diet well, ISS/ BGMs for glycemic control ongoing   no GI complains, + V/ +F      MEDICATIONS  (STANDING):  atorvastatin 20 milliGRAM(s) Oral at bedtime  dextrose 5%. 1000 milliLiter(s) (100 mL/Hr) IV Continuous <Continuous>  dextrose 5%. 1000 milliLiter(s) (50 mL/Hr) IV Continuous <Continuous>  dextrose 50% Injectable 12.5 Gram(s) IV Push once  dextrose 50% Injectable 25 Gram(s) IV Push once  dextrose 50% Injectable 25 Gram(s) IV Push once  enoxaparin Injectable 40 milliGRAM(s) SubCutaneous every 24 hours  glucagon  Injectable 1 milliGRAM(s) IntraMuscular once  influenza   Vaccine 0.5 milliLiter(s) IntraMuscular once  insulin glargine Injectable (LANTUS) 4 Unit(s) SubCutaneous at bedtime  insulin lispro (ADMELOG) corrective regimen sliding scale   SubCutaneous three times a day before meals  insulin lispro (ADMELOG) corrective regimen sliding scale   SubCutaneous at bedtime  insulin lispro Injectable (ADMELOG) 4 Unit(s) SubCutaneous three times a day before meals  losartan 50 milliGRAM(s) Oral daily  metoprolol succinate ER 50 milliGRAM(s) Oral two times a day  multivitamin 1 Tablet(s) Oral daily  NIFEdipine XL 60 milliGRAM(s) Oral daily  pantoprazole    Tablet 40 milliGRAM(s) Oral before breakfast  polyethylene glycol 3350 17 Gram(s) Oral daily  senna 2 Tablet(s) Oral at bedtime  sodium chloride 0.9% lock flush 3 milliLiter(s) IV Push every 8 hours    MEDICATIONS  (PRN):  acetaminophen     Tablet .. 650 milliGRAM(s) Oral every 6 hours PRN Moderate Pain (4 - 6)  dextrose Oral Gel 15 Gram(s) Oral once PRN Blood Glucose LESS THAN 70 milliGRAM(s)/deciliter  oxyCODONE    IR 5 milliGRAM(s) Oral every 6 hours PRN Severe Pain (7 - 10)    Allergies  No Known Allergies    Intolerances      Vital Signs Last 24 Hrs  T(C): 37.1 (03 Nov 2024 12:11), Max: 37.3 (03 Nov 2024 08:08)  T(F): 98.7 (03 Nov 2024 12:11), Max: 99.2 (03 Nov 2024 08:08)  HR: 60 (03 Nov 2024 12:11) (55 - 67)  BP: 108/59 (03 Nov 2024 12:11) (108/59 - 135/74)  BP(mean): 75 (03 Nov 2024 12:11) (75 - 75)  RR: 18 (03 Nov 2024 12:11) (18 - 18)  SpO2: 98% (03 Nov 2024 12:11) (95% - 98%)    Parameters below as of 03 Nov 2024 12:11  Patient On (Oxygen Delivery Method): room air          PHYSICAL EXAM:  General: NAD, NCAT, sitting comfortably in chair this morning   Cardio: NSR on monitor   Lungs: respirations unlabored on RA  Neuro: AOX4, speech is clear & fluent without dysarthria and aphasia, MONTERO AG 5/5 throughout, sensation and strength intact distally and proximally, no pronator drift or facial droop appreciated, PERRL, EOMI without nystagmus, visual fields intact on exam, no focal deficits appreciated, no calf tenderness appreciated on palpation b/l.  GCS: 15 (E4 / V5 / M6)      LABS:                        11.5   6.47  )-----------( 225      ( 03 Nov 2024 07:04 )             34.7     11-03    138  |  101  |  16.6  ----------------------------<  163[H]  4.1   |  25.0  |  1.06    Ca    9.2      03 Nov 2024 07:04  Phos  3.5     11-03  Mg     2.0     11-03        Urinalysis Basic - ( 03 Nov 2024 07:04 )    Color: x / Appearance: x / SG: x / pH: x  Gluc: 163 mg/dL / Ketone: x  / Bili: x / Urobili: x   Blood: x / Protein: x / Nitrite: x   Leuk Esterase: x / RBC: x / WBC x   Sq Epi: x / Non Sq Epi: x / Bacteria: x      RADIOLOGY & ADDITIONAL TESTS:  no new NSx images available for review       < from: US Duplex Venous Lower Ext Complete, Bilateral (11.02.24 @ 18:52) >  IMPRESSION: The bilateral peroneal, soleal and gastrocnemius veins were not visualized. Otherwise, no evidence of bilateral lower extremity deep vein thrombosis in the visualized veins.  --- End of Report ---  MARISA YOUSIF MD; Attending Radiologist  This document has been electronically signed. Nov 2 2024  6:56PM  < end of copied text >      < from: MR Sella alone w/wo IV Cont (10.28.24 @ 07:26) >  IMPRESSION:  1. PITUITARY:   Large pituitary tumor, likely adenoma, extensively elevates and distorts the optic chiasm and forebrain, invades the sphenoid sinus to the right of midline and likely invades the right cavernous sinus.  2. BRAIN:   Ischemic white matter disease greater than typical for age. Diffuse brain volume loss typical for age.  --- End ofReport ---  JENNIFER GONZALEZ MD; Attending Radiologist  This document has been electronically signed. Oct 28 2024  7:41AM  < end of copied text >

## 2024-11-03 NOTE — PROGRESS NOTE ADULT - SUBJECTIVE AND OBJECTIVE BOX
SAIRAYA SAGEIR    [id number]    63y      Male    Patient is a 63y old  Male who presents with a chief complaint of Pituitary Tumor (02 Nov 2024 13:08)      INTERVAL HPI/OVERNIGHT EVENTS:    patient is doing ok, denies fever, chills, chest pain, sob, no headache, dizziness       Vital Signs Last 24 Hrs  T(C): 37.3 (03 Nov 2024 08:08), Max: 37.3 (03 Nov 2024 08:08)  T(F): 99.2 (03 Nov 2024 08:08), Max: 99.2 (03 Nov 2024 08:08)  HR: 56 (03 Nov 2024 08:08) (55 - 67)  BP: 114/70 (03 Nov 2024 08:08) (114/70 - 135/74)  RR: 18 (03 Nov 2024 08:08) (18 - 18)  SpO2: 96% (03 Nov 2024 08:08) (95% - 97%)    Parameters below as of 03 Nov 2024 08:08  Patient On (Oxygen Delivery Method): room air        PHYSICAL EXAM:    GENERAL: Middle age male looking comfortable    HEENT: PERRL, +EOMI  NECK: soft, Supple, No JVD  CHEST/LUNG: Clear to auscultate bilaterally; No wheezing  HEART: S1S2+, Regular rate and rhythm; No murmurs  ABDOMEN: Soft, Nontender, Nondistended; Bowel sounds present  EXTREMITIES:  1+ Peripheral Pulses, No edema  SKIN: No rashes or lesions  NEURO: AAOX3  PSYCH: normal mood      LABS:                        11.5   6.47  )-----------( 225      ( 03 Nov 2024 07:04 )             34.7     11-03    138  |  101  |  16.6  ----------------------------<  163[H]  4.1   |  25.0  |  1.06    Ca    9.2      03 Nov 2024 07:04  Phos  3.5     11-03  Mg     2.0     11-03        I&O's Summary    02 Nov 2024 08:01  -  03 Nov 2024 07:00  --------------------------------------------------------  IN: 0 mL / OUT: 350 mL / NET: -350 mL        MEDICATIONS  (STANDING):  atorvastatin 20 milliGRAM(s) Oral at bedtime  dextrose 5%. 1000 milliLiter(s) (100 mL/Hr) IV Continuous <Continuous>  dextrose 5%. 1000 milliLiter(s) (50 mL/Hr) IV Continuous <Continuous>  dextrose 50% Injectable 12.5 Gram(s) IV Push once  dextrose 50% Injectable 25 Gram(s) IV Push once  dextrose 50% Injectable 25 Gram(s) IV Push once  enoxaparin Injectable 40 milliGRAM(s) SubCutaneous every 24 hours  glucagon  Injectable 1 milliGRAM(s) IntraMuscular once  influenza   Vaccine 0.5 milliLiter(s) IntraMuscular once  insulin glargine Injectable (LANTUS) 4 Unit(s) SubCutaneous at bedtime  insulin lispro (ADMELOG) corrective regimen sliding scale   SubCutaneous at bedtime  insulin lispro (ADMELOG) corrective regimen sliding scale   SubCutaneous three times a day before meals  insulin lispro Injectable (ADMELOG) 4 Unit(s) SubCutaneous three times a day before meals  losartan 50 milliGRAM(s) Oral daily  metoprolol succinate ER 50 milliGRAM(s) Oral two times a day  multivitamin 1 Tablet(s) Oral daily  NIFEdipine XL 60 milliGRAM(s) Oral daily  pantoprazole    Tablet 40 milliGRAM(s) Oral before breakfast  polyethylene glycol 3350 17 Gram(s) Oral daily  senna 2 Tablet(s) Oral at bedtime  sodium chloride 0.9% lock flush 3 milliLiter(s) IV Push every 8 hours    MEDICATIONS  (PRN):  acetaminophen     Tablet .. 650 milliGRAM(s) Oral every 6 hours PRN Moderate Pain (4 - 6)  dextrose Oral Gel 15 Gram(s) Oral once PRN Blood Glucose LESS THAN 70 milliGRAM(s)/deciliter  oxyCODONE    IR 5 milliGRAM(s) Oral every 6 hours PRN Severe Pain (7 - 10)

## 2024-11-03 NOTE — PROGRESS NOTE ADULT - ASSESSMENT
63M with PMH of DM, HTN, HLD, GERD, on ASA81 daily, known history of pituitary tumor diagnosed 8 years ago in [facility] with no follow up since then, presents with generalized weakness x 1 day and found to have large pituitary mass extensively   elevates and distorts the optic chiasm and forebrain, invades the sphenoid sinus to the right of midline and likely invades the right cavernous sinus.    1. Pituitary macroadenoma with optic chiasm compression  - Hormonal workup normal --> non functioning pituitary macroadenoma  - Surgical plan as per neurosurgery, for OR this week  - post op hormonal eval and DI watch    2. T2DM, a1c 7.1%-  suboptimal control  - try and optimize glucoses in preparation for surgery  - increase admelog 6 units TID  - increase lantus 6 units qhs  - Continue correction scale  - will follow    3. HLD  - Continue statin    4. Abnormal EKG,  Cardiac CTA done: Minimal non-obstructive CAD  - Cont BP meds, statin  - Glycemic control    5. urinary issues, frequency and burning, per nurse h/o kidney stones  - suggest check UA, work up/management per primary team

## 2024-11-03 NOTE — PROGRESS NOTE ADULT - NUTRITIONAL ASSESSMENT
Diet, DASH/TLC:   Sodium & Cholesterol Restricted (10-27-24 @ 23:38) [Active]
Diet, DASH/TLC:   Sodium & Cholesterol Restricted (10-27-24 @ 23:38) [Active]

## 2024-11-03 NOTE — PROGRESS NOTE ADULT - ASSESSMENT
63M with PMH of DM, HTN, HLD, GERD, on ASA81 daily, known history of pituitary tumor diagnosed 8 years ago in [place] with no follow up since then, presents with generalized weakness x 1 day.   Neurosurgery admitted for 2.7 x 2.7 x 3.9 cm pituitary mass with chiasmatic encroachment as noted on MRI  11/2 LED obtained for screening reporting no DVT in visualized veins, however, the bilateral peroneal, soleal and gastrocnemius veins were not visualized.     Plan:  - Q4 neuro checks   - All imaging reviewed: CTH and MRI Brain shows 2.7 x 2.7 x 3.9 cm pituitary mass with chiasmatic encroachment  - Endocrine recs appreciated; endocrinology following and hormonal work-up  - Cardiology consult, w/u, recs and clearance appreciated; patient optimized and cleared by cardiology on 10/28/24  - SBP goal <160  - Resume home HTN/HLD meds  -H/H anemia improving as noted on today's labs   -patient is medically optimized for OR/ ENDOSCOPIC TRANSPHENOIDAL RESECTION OF PITUITARY MASS on Wednesday 11/6 at 12:00   defer nonsurgical management to medical team  - Hold ASA for planned OR on Wednesday 11/6 at 12:00   - Bowel Regimen: Senna and Miralax  - DVT ppx: SCDs & SQL at 22:00 daily   - Neuro-ophthalmic evaluation completed, however, eval may need to be completed again if peripheral visual fields were not completed - this is being following up by NSx supervisor.    - pt seen by Dr Brunner this morning

## 2024-11-03 NOTE — PROGRESS NOTE ADULT - SUBJECTIVE AND OBJECTIVE BOX
Follow up: pitiuitary adenoma, diabetes  Interval Hx/Events: complains of urinary frequent and burning. otherwise no issues. appetite good    MEDICATIONS  (STANDING):  atorvastatin 20 milliGRAM(s) Oral at bedtime  dextrose 5%. 1000 milliLiter(s) (100 mL/Hr) IV Continuous <Continuous>  dextrose 5%. 1000 milliLiter(s) (50 mL/Hr) IV Continuous <Continuous>  dextrose 50% Injectable 12.5 Gram(s) IV Push once  dextrose 50% Injectable 25 Gram(s) IV Push once  dextrose 50% Injectable 25 Gram(s) IV Push once  enoxaparin Injectable 40 milliGRAM(s) SubCutaneous every 24 hours  glucagon  Injectable 1 milliGRAM(s) IntraMuscular once  influenza   Vaccine 0.5 milliLiter(s) IntraMuscular once  insulin glargine Injectable (LANTUS) 4 Unit(s) SubCutaneous at bedtime  insulin lispro (ADMELOG) corrective regimen sliding scale   SubCutaneous three times a day before meals  insulin lispro (ADMELOG) corrective regimen sliding scale   SubCutaneous at bedtime  insulin lispro Injectable (ADMELOG) 4 Unit(s) SubCutaneous three times a day before meals  losartan 50 milliGRAM(s) Oral daily  metoprolol succinate ER 50 milliGRAM(s) Oral two times a day  multivitamin 1 Tablet(s) Oral daily  NIFEdipine XL 60 milliGRAM(s) Oral daily  pantoprazole    Tablet 40 milliGRAM(s) Oral before breakfast  polyethylene glycol 3350 17 Gram(s) Oral daily  senna 2 Tablet(s) Oral at bedtime  sodium chloride 0.9% lock flush 3 milliLiter(s) IV Push every 8 hours    MEDICATIONS  (PRN):  acetaminophen     Tablet .. 650 milliGRAM(s) Oral every 6 hours PRN Moderate Pain (4 - 6)  dextrose Oral Gel 15 Gram(s) Oral once PRN Blood Glucose LESS THAN 70 milliGRAM(s)/deciliter  oxyCODONE    IR 5 milliGRAM(s) Oral every 6 hours PRN Severe Pain (7 - 10)    ALLERGIES: No Known Allergies    Vital Signs Last 24 Hrs  T(C): 37.1 (03 Nov 2024 12:11), Max: 37.3 (03 Nov 2024 08:08)  T(F): 98.7 (03 Nov 2024 12:11), Max: 99.2 (03 Nov 2024 08:08)  HR: 60 (03 Nov 2024 12:11) (56 - 67)  BP: 108/59 (03 Nov 2024 12:11) (108/59 - 135/74)  BP(mean): 75 (03 Nov 2024 12:11) (75 - 75)  RR: 18 (03 Nov 2024 12:11) (18 - 18)  SpO2: 98% (03 Nov 2024 12:11) (96% - 98%)    Parameters below as of 03 Nov 2024 12:11  Patient On (Oxygen Delivery Method): room air    Physical Exam:  General: No apparent distress  Respiratory: Lungs clear bilaterally  Cardiac: +S1, S2, no m/r/g  GI: +BS, soft, non tender, non distended  Extremities: No peripheral edema  Neuro: A+O X3t      LABS:                        11.5   6.47  )-----------( 225      ( 03 Nov 2024 07:04 )             34.7     11-03    138  |  101  |  16.6  ----------------------------<  163[H]  4.1   |  25.0  |  1.06    Ca    9.2      03 Nov 2024 07:04  Phos  3.5     11-03  Mg     2.0     11-03    A1C with Estimated Average Glucose Result: 7.2 % (10-30-24 @ 05:00)  A1C with Estimated Average Glucose Result: 7.1 % (10-29-24 @ 05:12)    CAPILLARY BLOOD GLUCOSE  POCT Blood Glucose.: 240 mg/dL (03 Nov 2024 12:58)  POCT Blood Glucose.: 180 mg/dL (03 Nov 2024 09:03)  POCT Blood Glucose.: 165 mg/dL (02 Nov 2024 21:38)  POCT Blood Glucose.: 195 mg/dL (02 Nov 2024 17:25)  POCT Blood Glucose.: 206 mg/dL (02 Nov 2024 11:50)  POCT Blood Glucose.: 163 mg/dL (02 Nov 2024 08:03)  POCT Blood Glucose.: 180 mg/dL (01 Nov 2024 21:32)  POCT Blood Glucose.: 147 mg/dL (01 Nov 2024 17:55)      Free Thyroxine, Serum: 1.1 ng/dL [0.9 - 1.8] (10-30-24)  Thyroid Stimulating Hormone, Serum: 0.60 uIU/mL [0.27 - 4.20] (10-29-24)  Free Thyroxine, Serum: 1.0 ng/dL [0.9 - 1.8] (10-29-24)  Thyroid Stimulating Hormone, Serum: 0.38 uIU/mL [0.27 - 4.20] (10-28-24)

## 2024-11-03 NOTE — PROGRESS NOTE ADULT - ASSESSMENT
63 M with PMH of DM, HTN, HLD, GERD, on ASA81 daily, known history of pituitary tumor diagnosed 8 years ago in [facility] with no follow up since then, presents with generalized weakness x 1 day. Patient reports difficulty getting out of his chair with unsteady gait since this morning. Patient currently c/o mild headache and burning with urination. Denies any recent falls/trauma, vision changes, dizziness, nausea/vomiting, numbness, or tingling. 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 Pt also noted to have TWI on EKG, cards consulted and further w/u pending. Pt reportedly claims to be compliant on home cardiac medications. Pt admitted to neurosurgery for likely surgical resection of pituitary tumor with Dr. Harmon.     Plan:     Pituitary tumor   - MRI brain reviewed: Large pituitary tumor, likely adenoma, extensively elevates and distorts the optic chiasm and forebrain, invades the sphenoid sinus to the right of midline and likely invades the right cavernous sinus.  - Neuro checks   - NSG plan for surgical resection this admission, likely on 11/6 Wednesday  - Neuro ophtho eval done 10/31 per NSG   - SBP goal <160  - Hormonal evaluation per Endocrine   - Pt is medically optimized for Surgery     Anemia, normocytic  - Hb is stable     Abnormal EKG   - TTE reviewed   - Cardiac CTA done: Minimal non-obstructive CAD   - Cardio on board     DM   - A1C 7.2   - c/w Insulin and ISS  - BG monitoring     HTN  HLD   - c/w Toprol XL, Losartan, Nifedipine  - c/w statin     DVT ppx - SCDs     Dispo: pending surgery, remains medically active.

## 2024-11-04 LAB
GLUCOSE BLDC GLUCOMTR-MCNC: 160 MG/DL — HIGH (ref 70–99)
GLUCOSE BLDC GLUCOMTR-MCNC: 180 MG/DL — HIGH (ref 70–99)
GLUCOSE BLDC GLUCOMTR-MCNC: 195 MG/DL — HIGH (ref 70–99)
GLUCOSE BLDC GLUCOMTR-MCNC: 318 MG/DL — HIGH (ref 70–99)

## 2024-11-04 PROCEDURE — 99232 SBSQ HOSP IP/OBS MODERATE 35: CPT

## 2024-11-04 PROCEDURE — 99233 SBSQ HOSP IP/OBS HIGH 50: CPT

## 2024-11-04 RX ORDER — INSULIN GLARGINE,HUM.REC.ANLOG 100/ML
8 VIAL (ML) SUBCUTANEOUS AT BEDTIME
Refills: 0 | Status: DISCONTINUED | OUTPATIENT
Start: 2024-11-04 | End: 2024-11-05

## 2024-11-04 RX ORDER — INSULIN LISPRO 100/ML
8 VIAL (ML) SUBCUTANEOUS
Refills: 0 | Status: DISCONTINUED | OUTPATIENT
Start: 2024-11-04 | End: 2024-11-06

## 2024-11-04 RX ADMIN — Medication 40 MILLIGRAM(S): at 22:44

## 2024-11-04 RX ADMIN — SODIUM CHLORIDE 3 MILLILITER(S): 9 INJECTION, SOLUTION INTRAMUSCULAR; INTRAVENOUS; SUBCUTANEOUS at 05:58

## 2024-11-04 RX ADMIN — SODIUM CHLORIDE 3 MILLILITER(S): 9 INJECTION, SOLUTION INTRAMUSCULAR; INTRAVENOUS; SUBCUTANEOUS at 13:32

## 2024-11-04 RX ADMIN — Medication 60 MILLIGRAM(S): at 06:03

## 2024-11-04 RX ADMIN — SODIUM CHLORIDE 3 MILLILITER(S): 9 INJECTION, SOLUTION INTRAMUSCULAR; INTRAVENOUS; SUBCUTANEOUS at 22:26

## 2024-11-04 RX ADMIN — Medication 2: at 18:34

## 2024-11-04 RX ADMIN — Medication 8 UNIT(S): at 22:44

## 2024-11-04 RX ADMIN — Medication 20 MILLIGRAM(S): at 22:44

## 2024-11-04 RX ADMIN — Medication 1 TABLET(S): at 12:03

## 2024-11-04 RX ADMIN — Medication 2: at 09:00

## 2024-11-04 RX ADMIN — Medication 6 UNIT(S): at 12:56

## 2024-11-04 RX ADMIN — Medication 8: at 12:56

## 2024-11-04 RX ADMIN — PANTOPRAZOLE SODIUM 40 MILLIGRAM(S): 40 TABLET, DELAYED RELEASE ORAL at 06:03

## 2024-11-04 RX ADMIN — Medication 6 UNIT(S): at 09:01

## 2024-11-04 RX ADMIN — Medication 2 TABLET(S): at 22:44

## 2024-11-04 RX ADMIN — Medication 8 UNIT(S): at 18:35

## 2024-11-04 RX ADMIN — Medication 50 MILLIGRAM(S): at 17:45

## 2024-11-04 RX ADMIN — LOSARTAN POTASSIUM 50 MILLIGRAM(S): 25 TABLET ORAL at 06:03

## 2024-11-04 RX ADMIN — Medication 650 MILLIGRAM(S): at 06:03

## 2024-11-04 RX ADMIN — Medication 50 MILLIGRAM(S): at 06:03

## 2024-11-04 RX ADMIN — POLYETHYLENE GLYCOL 3350 17 GRAM(S): 17 POWDER, FOR SOLUTION ORAL at 12:03

## 2024-11-04 NOTE — PROGRESS NOTE ADULT - ASSESSMENT
63 M with PMH of DM, HTN, HLD, GERD, on ASA81 daily, known history of pituitary tumor diagnosed 8 years ago in [facility] with no follow up since then, presents with generalized weakness x 1 day. Patient reports difficulty getting out of his chair with unsteady gait since this morning. Patient currently c/o mild headache and burning with urination. Denies any recent falls/trauma, vision changes, dizziness, nausea/vomiting, numbness, or tingling. 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 Pt also noted to have TWI on EKG, cards consulted and further w/u pending. Pt reportedly claims to be compliant on home cardiac medications. Pt admitted to neurosurgery for likely surgical resection of pituitary tumor with Dr. Harmon.       # Pituitary tumor   - MRI brain reviewed: Large pituitary tumor, likely adenoma, extensively elevates and distorts the optic chiasm and forebrain, invades the sphenoid sinus to the right of midline and likely invades the right cavernous sinus.  - Neuro checks   - NSG plan for surgical resection this admission, likely on 11/6 Wednesday  - Neuro ophtho eval done 10/31 per NSG   - SBP goal <160  - Hormonal evaluation per Endocrine   - Pt is medically optimized for Surgery     # Anemia, normocytic  - Hb is stable     # Abnormal EKG   - TTE reviewed   - Cardiac CTA done: Minimal non-obstructive CAD   - Cardio on board     # DM   A1C 7.2   Uncontrolled  - Blood glucose monitoring with sliding scale Lispro  - Increase basal and bolus Insulins as per Endocrinology recommendations     # HTN  # HLD   - Toprol XL, Losartan, Nifedipine  - statin     DVT ppx - SCDs     Dispo: pending surgery, remains medically active.

## 2024-11-04 NOTE — PROGRESS NOTE ADULT - SUBJECTIVE AND OBJECTIVE BOX
HPI: 63M with PMH of DM, HTN, HLD, GERD, on ASA81 daily, known history of pituitary tumor diagnosed 8 years ago in [place] with no follow up since then, presents with generalized weakness x 1 day. Patient reports difficulty getting out of his chair with unsteady gait since this morning. Patient currently c/o mild headache and burning with urination. Denies any recent falls/trauma, vision changes, dizziness, nausea/vomiting, numbness, or tingling. 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 Pt also noted to have TWI on EKG, cards consulted and further w/u pending. Pt reportedly claims to be compliant on home cardiac medications. Pt admitted to neurosurgery for likely surgical resection of pituitary tumor with Dr. Harmon.    PAST MEDICAL & SURGICAL HISTORY:  HTN (hypertension)  HLD (hyperlipidemia)  DM (diabetes mellitus)  GERD (gastroesophageal reflux disease)  H/O: pituitary tumor    No significant past surgical history      FAMILY HISTORY:  No pertinent family history in first degree relatives      Allergies  No Known Allergies  Intolerances    REVIEW OF SYSTEMS  As noted in HPI    HOME MEDICATIONS:  Home Medications:  atorvastatin 20 mg oral tablet: 1 tab(s) orally once a day (at bedtime) (28 Oct 2024 16:14)  losartan 50 mg oral tablet: 1 tab(s) orally once a day (28 Oct 2024 16:14)  metFORMIN 500 mg oral tablet: 1 tab(s) orally 2 times a day (28 Oct 2024 16:14)  metoprolol succinate 50 mg oral tablet, extended release: 1 tab(s) orally 2 times a day (28 Oct 2024 16:14)  NIFEdipine (Eqv-Adalat CC) 60 mg oral tablet, extended release: 1 tab(s) orally once a day (28 Oct 2024 16:14)      MEDICATIONS:  Antibiotics:    Neuro:  acetaminophen     Tablet .. 650 milliGRAM(s) Oral every 6 hours PRN  oxyCODONE    IR 5 milliGRAM(s) Oral every 6 hours PRN    Anticoagulation:    OTHER:  atorvastatin 20 milliGRAM(s) Oral at bedtime  dextrose 50% Injectable 25 Gram(s) IV Push once  dextrose 50% Injectable 25 Gram(s) IV Push once  dextrose 50% Injectable 12.5 Gram(s) IV Push once  dextrose Oral Gel 15 Gram(s) Oral once PRN  glucagon  Injectable 1 milliGRAM(s) IntraMuscular once  insulin lispro (ADMELOG) corrective regimen sliding scale   SubCutaneous three times a day before meals  insulin lispro (ADMELOG) corrective regimen sliding scale   SubCutaneous at bedtime  losartan 50 milliGRAM(s) Oral daily  metoprolol succinate ER 50 milliGRAM(s) Oral two times a day  NIFEdipine XL 60 milliGRAM(s) Oral daily  pantoprazole    Tablet 40 milliGRAM(s) Oral before breakfast  polyethylene glycol 3350 17 Gram(s) Oral daily  senna 2 Tablet(s) Oral at bedtime    IVF:  dextrose 5%. 1000 milliLiter(s) IV Continuous <Continuous>  dextrose 5%. 1000 milliLiter(s) IV Continuous <Continuous>  multivitamin 1 Tablet(s) Oral daily  sodium chloride 0.9% lock flush 3 milliLiter(s) IV Push every 8 hours      Vital Signs Last 24 Hrs  T(C): 36.7 (28 Oct 2024 15:19), Max: 37.1 (27 Oct 2024 16:37)  T(F): 98.1 (28 Oct 2024 15:19), Max: 98.7 (27 Oct 2024 16:37)  HR: 68 (28 Oct 2024 15:19) (61 - 98)  BP: 138/88 (28 Oct 2024 15:19) (130/68 - 162/88)  BP(mean): --  RR: 19 (28 Oct 2024 15:19) (16 - 19)  SpO2: 92% (28 Oct 2024 15:19) (92% - 96%)    Parameters below as of 28 Oct 2024 07:55  Patient On (Oxygen Delivery Method): room air   (28 Oct 2024 16:19)        INTERVAL HPI/OVERNIGHT EVENTS:  63y Male seen and examined at bedside. patient neurologically stable. Pending OR for pituitary tumor resection.     Vital Signs Last 24 Hrs  T(C): 36.9 (04 Nov 2024 08:05), Max: 37.1 (03 Nov 2024 12:11)  T(F): 98.5 (04 Nov 2024 08:05), Max: 98.7 (03 Nov 2024 12:11)  HR: 51 (04 Nov 2024 08:05) (51 - 64)  BP: 129/81 (04 Nov 2024 08:05) (101/61 - 129/81)  BP(mean): 75 (03 Nov 2024 12:11) (75 - 75)  RR: 18 (04 Nov 2024 08:05) (17 - 19)  SpO2: 97% (04 Nov 2024 08:05) (96% - 98%)    Parameters below as of 04 Nov 2024 08:05  Patient On (Oxygen Delivery Method): room air    PHYSICAL EXAM:  General: NAD  Cardio:+S1 +S2  Lungs: normal chest rise and expansion   Neuro: AOX4, speech is clear & fluent without dysarthria and aphasia, MONTERO AG 5/5 throughout, sensation and strength intact distally and proximally, no pronator drift or facial droop appreciated, PERRL, EOMI without nystagmus, visual fields intact on exam, no focal deficits appreciated, no calf tenderness appreciated on palpation b/l.        LABS:                        11.5   6.47  )-----------( 225      ( 03 Nov 2024 07:04 )             34.7     11-03    138  |  101  |  16.6  ----------------------------<  163[H]  4.1   |  25.0  |  1.06    Ca    9.2      03 Nov 2024 07:04  Phos  3.5     11-03  Mg     2.0     11-03        Urinalysis Basic - ( 03 Nov 2024 07:04 )    Color: x / Appearance: x / SG: x / pH: x  Gluc: 163 mg/dL / Ketone: x  / Bili: x / Urobili: x   Blood: x / Protein: x / Nitrite: x   Leuk Esterase: x / RBC: x / WBC x   Sq Epi: x / Non Sq Epi: x / Bacteria: x        11-03 @ 07:01  -  11-04 @ 07:00  --------------------------------------------------------  IN: 0 mL / OUT: 450 mL / NET: -450 mL        RADIOLOGY & ADDITIONAL TESTS:  MR Sella alone w/wo IV Cont (10.28.24 @ 07:26)     IMPRESSION:    1. 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    2. BRAIN:   Ischemic white matter disease greater than typical for age.   Diffuse brain volume loss typical for age.    63M with PMH of DM, HTN, HLD, GERD, on ASA81 daily, known history of pituitary tumor diagnosed 8 years ago in [facility] with no follow up since then, presents with generalized weakness x 1 day.   Neurosurgery admitted for 2.7 x 2.7 x 3.9 cm pituitary mass with chiasmatic encroachment as noted on MRI  11/2 LED obtained for screening reporting no DVT in visualized veins, however, the bilateral peroneal, soleal and gastrocnemius veins were not visualized.     Plan:  - Q4 neuro checks   - All imaging reviewed: CTH and MRI Brain shows 2.7 x 2.7 x 3.9 cm pituitary mass with chiasmatic encroachment  - Endocrine recs appreciated; endocrinology following and hormonal work-up  - patient optimized and cleared by cardiology on 10/28/24  - SBP goal <160  - Resume home HTN/HLD meds  -patient is medically optimized for OR/ ENDOSCOPIC TRANSPHENOIDAL RESECTION OF PITUITARY MASS on Wednesday 11/6 at 12:00   defer nonsurgical management to medical team  - Hold ASA for planned OR on Wednesday 11/6 at 12:00   - Bowel Regimen: Senna and Miralax  - DVT ppx: SCDs & SQL at 22:00 daily   - Neuro-ophthalmic evaluation completed, however, eval to be completed again for peripheral visual fields testing .

## 2024-11-04 NOTE — PROGRESS NOTE ADULT - SUBJECTIVE AND OBJECTIVE BOX
INTERVAL EVENTS:  Follow up pituitary macroadenoma, diabetes. No acute complaints, endorses good appetite.     MEDICATIONS  (STANDING):  atorvastatin 20 milliGRAM(s) Oral at bedtime  dextrose 5%. 1000 milliLiter(s) (100 mL/Hr) IV Continuous <Continuous>  dextrose 5%. 1000 milliLiter(s) (50 mL/Hr) IV Continuous <Continuous>  dextrose 50% Injectable 12.5 Gram(s) IV Push once  dextrose 50% Injectable 25 Gram(s) IV Push once  dextrose 50% Injectable 25 Gram(s) IV Push once  enoxaparin Injectable 40 milliGRAM(s) SubCutaneous every 24 hours  glucagon  Injectable 1 milliGRAM(s) IntraMuscular once  influenza   Vaccine 0.5 milliLiter(s) IntraMuscular once  insulin glargine Injectable (LANTUS) 6 Unit(s) SubCutaneous at bedtime  insulin lispro (ADMELOG) corrective regimen sliding scale   SubCutaneous three times a day before meals  insulin lispro (ADMELOG) corrective regimen sliding scale   SubCutaneous at bedtime  insulin lispro Injectable (ADMELOG) 6 Unit(s) SubCutaneous three times a day before meals  losartan 50 milliGRAM(s) Oral daily  metoprolol succinate ER 50 milliGRAM(s) Oral two times a day  multivitamin 1 Tablet(s) Oral daily  NIFEdipine XL 60 milliGRAM(s) Oral daily  pantoprazole    Tablet 40 milliGRAM(s) Oral before breakfast  polyethylene glycol 3350 17 Gram(s) Oral daily  senna 2 Tablet(s) Oral at bedtime  sodium chloride 0.9% lock flush 3 milliLiter(s) IV Push every 8 hours    MEDICATIONS  (PRN):  acetaminophen     Tablet .. 650 milliGRAM(s) Oral every 6 hours PRN Moderate Pain (4 - 6)  dextrose Oral Gel 15 Gram(s) Oral once PRN Blood Glucose LESS THAN 70 milliGRAM(s)/deciliter  oxyCODONE    IR 5 milliGRAM(s) Oral every 6 hours PRN Severe Pain (7 - 10)    Allergies  No Known Allergies    Vital Signs Last 24 Hrs  T(C): 36.9 (04 Nov 2024 08:05), Max: 37.1 (03 Nov 2024 12:11)  T(F): 98.5 (04 Nov 2024 08:05), Max: 98.7 (03 Nov 2024 12:11)  HR: 51 (04 Nov 2024 08:05) (51 - 64)  BP: 129/81 (04 Nov 2024 08:05) (101/61 - 129/81)  BP(mean): 75 (03 Nov 2024 12:11) (75 - 75)  RR: 18 (04 Nov 2024 08:05) (17 - 19)  SpO2: 97% (04 Nov 2024 08:05) (96% - 98%)    Parameters below as of 04 Nov 2024 08:05  Patient On (Oxygen Delivery Method): room air    PHYSICAL EXAM:  General: NAD  Respiratory: Lungs clear bilaterally  Cardiac: +S1, S2, no m/r/g  GI: +BS, soft, non tender, non distended  Extremities: No peripheral edema  Neuro: A+O X3    LABS:                        11.5   6.47  )-----------( 225      ( 03 Nov 2024 07:04 )             34.7     11-03    138  |  101  |  16.6  ----------------------------<  163[H]  4.1   |  25.0  |  1.06    Ca    9.2      03 Nov 2024 07:04  Phos  3.5     11-03  Mg     2.0     11-03      Urinalysis Basic - ( 03 Nov 2024 07:04 )    Color: x / Appearance: x / SG: x / pH: x  Gluc: 163 mg/dL / Ketone: x  / Bili: x / Urobili: x   Blood: x / Protein: x / Nitrite: x   Leuk Esterase: x / RBC: x / WBC x   Sq Epi: x / Non Sq Epi: x / Bacteria: x    POCT Blood Glucose.: 195 mg/dL (11-04-24 @ 08:59)  POCT Blood Glucose.: 228 mg/dL (11-03-24 @ 21:37)  POCT Blood Glucose.: 152 mg/dL (11-03-24 @ 17:47)  POCT Blood Glucose.: 240 mg/dL (11-03-24 @ 12:58)    Free Thyroxine, Serum: 1.1 ng/dL (10-30-24 @ 05:00)  Thyroid Stimulating Hormone, Serum: 0.60 uIU/mL (10-29-24 @ 05:12)  Free Thyroxine, Serum: 1.0 ng/dL (10-29-24 @ 05:12)  Thyroid Stimulating Hormone, Serum: 0.38 uIU/mL (10-28-24 @ 00:47)

## 2024-11-04 NOTE — PROGRESS NOTE ADULT - ASSESSMENT
63M with PMH of DM, HTN, HLD, GERD, on ASA81 daily, known history of pituitary tumor diagnosed 8 years ago in [facility] with no follow up since then, presents with generalized weakness x 1 day and found to have large pituitary mass extensively   elevates and distorts the optic chiasm and forebrain, invades the sphenoid sinus to the right of midline and likely invades the right cavernous sinus.    1. Pituitary macroadenoma with optic chiasm compression  - Hormonal workup normal --> non functioning pituitary macroadenoma  - Surgical plan as per neurosurgery, for OR on Wednesday    2. T2DM, a1c 7.1%  - Continue lantus 6 units qhs  - Continue admelog 6 units TID  - Correction scale    3. HLD  - Continue statin       63M with PMH of DM, HTN, HLD, GERD, on ASA81 daily, known history of pituitary tumor diagnosed 8 years ago in [facility] with no follow up since then, presents with generalized weakness x 1 day and found to have large pituitary mass extensively   elevates and distorts the optic chiasm and forebrain, invades the sphenoid sinus to the right of midline and likely invades the right cavernous sinus.    1. Pituitary macroadenoma with optic chiasm compression  - Hormonal workup normal --> non functioning pituitary macroadenoma  - Surgical plan as per neurosurgery, for OR on Wednesday    2. T2DM, a1c 7.1%- glucoses elevated  - Increase admelog to 8 units TID  - Increase lantus to 8 units qhs  - Correction scale    3. HLD  - Continue statin

## 2024-11-04 NOTE — PROGRESS NOTE ADULT - SUBJECTIVE AND OBJECTIVE BOX
HOSPITALIST PROGRESS NOTE    SAI HENDRIX  [id number]  63yMale    Patient is a 63y old  Male who presents with a chief complaint of Pituitary Tumor (04 Nov 2024 10:59)      SUBJECTIVE:   Chart reviewed since last visit.   Patient seen and examined at bedside for pituitary adenoma, GERD, HTN, Diabetes.  Complaining of double vision. Complain of chest discomfort      OBJECTIVE:  Vital Signs Last 24 Hrs  T(C): 36.9 (04 Nov 2024 08:05), Max: 36.9 (04 Nov 2024 08:05)  T(F): 98.5 (04 Nov 2024 08:05), Max: 98.5 (04 Nov 2024 08:05)  HR: 51 (04 Nov 2024 08:05) (51 - 64)  BP: 129/81 (04 Nov 2024 08:05) (101/61 - 129/81)   RR: 18 (04 Nov 2024 08:05) (17 - 19)  SpO2: 97% (04 Nov 2024 08:05) (96% - 97%)    Parameters below as of 04 Nov 2024 08:05  Patient On (Oxygen Delivery Method): room air        PHYSICAL EXAMINATION  General: Middle aged male sitting up in bed  HEENT:  extraocular movements intact pupils equal, responsive, reactive to light and accomodation   NECK:  Supple  CVS: regular rate and rhythm S1 S2  RESP:  Fair air entry bilaterally  GI:  Soft nondistended nontender BS+  : No suprapubic tenderness  MSK:  Moves all extremities  CNS: Awake, alert, oriented. Follows simple commands, fluent speech  INTEG:  Warm skin  PSYCH:  Fair mood    MONITOR:  CAPILLARY BLOOD GLUCOSE      POCT Blood Glucose.: 318 mg/dL (04 Nov 2024 12:55)  POCT Blood Glucose.: 195 mg/dL (04 Nov 2024 08:59)  POCT Blood Glucose.: 228 mg/dL (03 Nov 2024 21:37)  POCT Blood Glucose.: 152 mg/dL (03 Nov 2024 17:47)    A1C with Estimated Average Glucose Result: 7.2 % (10.30.24 @ 05:00)       I&O's Summary    03 Nov 2024 07:01  -  04 Nov 2024 07:00  --------------------------------------------------------  IN: 0 mL / OUT: 450 mL / NET: -450 mL    04 Nov 2024 07:01  -  04 Nov 2024 13:02  --------------------------------------------------------  IN: 0 mL / OUT: 500 mL / NET: -500 mL                            11.5   6.47  )-----------( 225      ( 03 Nov 2024 07:04 )             34.7       11-03    138  |  101  |  16.6  ----------------------------<  163[H]  4.1   |  25.0  |  1.06    Ca    9.2      03 Nov 2024 07:04  Phos  3.5     11-03  Mg     2.0     11-03          Urinalysis Basic - ( 03 Nov 2024 07:04 )    Color: x / Appearance: x / SG: x / pH: x  Gluc: 163 mg/dL / Ketone: x  / Bili: x / Urobili: x   Blood: x / Protein: x / Nitrite: x   Leuk Esterase: x / RBC: x / WBC x   Sq Epi: x / Non Sq Epi: x / Bacteria: x            TTE:  < from: TTE W or WO Ultrasound Enhancing Agent (10.28.24 @ 11:44) >   1. Left ventricular systolic function is normal with an ejection fraction of 71 % by Casas's method of disks with an ejection fraction visually estimated at 60 to 65 %.   2. Normal left ventricular diastolic function.   3. Normal right ventricular cavity size and normal right ventricular systolic function.   4. Left atrium is normal in size.   5. Mild tricuspid regurgitation.   6. Estimated pulmonary artery systolic pressure is 36 mmHg, consistent with borderline pulmonary hypertension.   7. Trileaflet aortic valve with normal systolic excursion. Fibrocalcific aortic valve sclerosis without stenosis.   8. No pericardial effusion seen.   9. No prior echocardiogram is available for comparison.    < end of copied text >        RADIOLOGY      < from: Xray Chest 1 View- PORTABLE-Urgent (Xray Chest 1 View- PORTABLE-Urgent .) (10.27.24 @ 13:25) >  IMPRESSION: Normal chest    < end of copied text >      < from: CT Angio Head w/ IV Cont (10.27.24 @ 17:02) >  IMPRESSION:    1.  Large pituitary tumor with chiasmatic encroachment, consider   pituitary MRI.  2.  Invasion of the cavernous sinuses and sphenoid sinuses.  3.  Incidental vascular lesion dorsal to the right thyroid.    < end of copied text >    < from: MR Head w/wo IV Cont (10.28.24 @ 07:26) >  IMPRESSION:    1. 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    2. BRAIN:   Ischemic white matter disease greater than typical for age.   Diffuse brain volume loss typical for age.    < end of copied text >      < from: MR Sella alone w/wo IV Cont (10.28.24 @ 07:26) >  IMPRESSION:    1. 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    2. BRAIN:   Ischemic white matter disease greater than typical for age.   Diffuse brain volume loss typical for age.    < end of copied text >      < from: CT Angio Cardiac w/ IV Cont (10.28.24 @ 09:06) >  Cardiac:  1. There is minimal non-obstructive coronary artery disease (CAD) of the   mid LAD, mid LCx and mid RCA consistent with CAD-RADS 1. Co-dominant   coronary circulation (PDA originates from RCA and PLV originates from   LCx).  2. Observed calcium score 58 is at percentile 52  for individuals of same   age, gender, and race/ethnicity who are free of clinical cardiovascular   disease and treated diabetes mellitus ([place] NIH database).  3. Aortic valve with trivial calcification.  4. Mild sinotubular junction calcification.    < end of copied text >    < from: US Duplex Venous Lower Ext Complete, Bilateral (11.02.24 @ 18:52) >  IMPRESSION:  The bilateral peroneal, soleal and gastrocnemius veins were not   visualized. Otherwise, no evidence of bilateral lower extremity deep vein   thrombosis in the visualized veins.    < end of copied text >      MEDICATIONS  (STANDING):  atorvastatin 20 milliGRAM(s) Oral at bedtime  dextrose 5%. 1000 milliLiter(s) (50 mL/Hr) IV Continuous <Continuous>  dextrose 5%. 1000 milliLiter(s) (100 mL/Hr) IV Continuous <Continuous>  dextrose 50% Injectable 25 Gram(s) IV Push once  dextrose 50% Injectable 25 Gram(s) IV Push once  dextrose 50% Injectable 12.5 Gram(s) IV Push once  enoxaparin Injectable 40 milliGRAM(s) SubCutaneous every 24 hours  glucagon  Injectable 1 milliGRAM(s) IntraMuscular once  influenza   Vaccine 0.5 milliLiter(s) IntraMuscular once  insulin glargine Injectable (LANTUS) 6 Unit(s) SubCutaneous at bedtime  insulin lispro (ADMELOG) corrective regimen sliding scale   SubCutaneous three times a day before meals  insulin lispro (ADMELOG) corrective regimen sliding scale   SubCutaneous at bedtime  insulin lispro Injectable (ADMELOG) 6 Unit(s) SubCutaneous three times a day before meals  losartan 50 milliGRAM(s) Oral daily  metoprolol succinate ER 50 milliGRAM(s) Oral two times a day  multivitamin 1 Tablet(s) Oral daily  NIFEdipine XL 60 milliGRAM(s) Oral daily  pantoprazole    Tablet 40 milliGRAM(s) Oral before breakfast  polyethylene glycol 3350 17 Gram(s) Oral daily  senna 2 Tablet(s) Oral at bedtime  sodium chloride 0.9% lock flush 3 milliLiter(s) IV Push every 8 hours      MEDICATIONS  (PRN):  acetaminophen     Tablet .. 650 milliGRAM(s) Oral every 6 hours PRN Moderate Pain (4 - 6)  dextrose Oral Gel 15 Gram(s) Oral once PRN Blood Glucose LESS THAN 70 milliGRAM(s)/deciliter  oxyCODONE    IR 5 milliGRAM(s) Oral every 6 hours PRN Severe Pain (7 - 10)

## 2024-11-05 ENCOUNTER — OFFICE (OUTPATIENT)
Dept: URBAN - METROPOLITAN AREA CLINIC 115 | Facility: CLINIC | Age: 64
Setting detail: OPHTHALMOLOGY
End: 2024-11-05
Payer: COMMERCIAL

## 2024-11-05 DIAGNOSIS — D35.2: ICD-10-CM

## 2024-11-05 DIAGNOSIS — D35.2 BENIGN NEOPLASM OF PITUITARY GLAND: ICD-10-CM

## 2024-11-05 DIAGNOSIS — H25.13: ICD-10-CM

## 2024-11-05 LAB
GLUCOSE BLDC GLUCOMTR-MCNC: 152 MG/DL — HIGH (ref 70–99)
GLUCOSE BLDC GLUCOMTR-MCNC: 176 MG/DL — HIGH (ref 70–99)
GLUCOSE BLDC GLUCOMTR-MCNC: 211 MG/DL — HIGH (ref 70–99)
GLUCOSE BLDC GLUCOMTR-MCNC: 212 MG/DL — HIGH (ref 70–99)
MRSA PCR RESULT.: SIGNIFICANT CHANGE UP
S AUREUS DNA NOSE QL NAA+PROBE: SIGNIFICANT CHANGE UP

## 2024-11-05 PROCEDURE — 99024 POSTOP FOLLOW-UP VISIT: CPT | Performed by: OPHTHALMOLOGY

## 2024-11-05 PROCEDURE — 99232 SBSQ HOSP IP/OBS MODERATE 35: CPT

## 2024-11-05 PROCEDURE — 92083 EXTENDED VISUAL FIELD XM: CPT | Performed by: OPHTHALMOLOGY

## 2024-11-05 PROCEDURE — 70486 CT MAXILLOFACIAL W/O DYE: CPT | Mod: 26

## 2024-11-05 PROCEDURE — 99233 SBSQ HOSP IP/OBS HIGH 50: CPT

## 2024-11-05 RX ORDER — POVIDONE-IODINE 0.07 MG/ML
1 SOLUTION TOPICAL ONCE
Refills: 0 | Status: COMPLETED | OUTPATIENT
Start: 2024-11-05 | End: 2024-11-06

## 2024-11-05 RX ORDER — INSULIN GLARGINE,HUM.REC.ANLOG 100/ML
4 VIAL (ML) SUBCUTANEOUS AT BEDTIME
Refills: 0 | Status: DISCONTINUED | OUTPATIENT
Start: 2024-11-05 | End: 2024-11-06

## 2024-11-05 RX ORDER — SODIUM CHLORIDE 9 MG/ML
1000 INJECTION, SOLUTION INTRAMUSCULAR; INTRAVENOUS; SUBCUTANEOUS
Refills: 0 | Status: DISCONTINUED | OUTPATIENT
Start: 2024-11-06 | End: 2024-11-06

## 2024-11-05 RX ORDER — CEFAZOLIN SODIUM 1 G
2000 VIAL (EA) INJECTION ONCE
Refills: 0 | Status: DISCONTINUED | OUTPATIENT
Start: 2024-11-05 | End: 2024-11-05

## 2024-11-05 RX ORDER — CEFAZOLIN SODIUM 1 G
2000 VIAL (EA) INJECTION ONCE
Refills: 0 | Status: DISCONTINUED | OUTPATIENT
Start: 2024-11-05 | End: 2024-11-06

## 2024-11-05 RX ORDER — CHLORHEXIDINE GLUCONATE 40 MG/ML
1 SOLUTION TOPICAL EVERY 12 HOURS
Refills: 0 | Status: COMPLETED | OUTPATIENT
Start: 2024-11-05 | End: 2024-11-06

## 2024-11-05 RX ADMIN — Medication 2 TABLET(S): at 22:15

## 2024-11-05 RX ADMIN — Medication 50 MILLIGRAM(S): at 17:37

## 2024-11-05 RX ADMIN — Medication 4 UNIT(S): at 22:15

## 2024-11-05 RX ADMIN — Medication 8 UNIT(S): at 18:04

## 2024-11-05 RX ADMIN — Medication 8 UNIT(S): at 13:42

## 2024-11-05 RX ADMIN — SODIUM CHLORIDE 3 MILLILITER(S): 9 INJECTION, SOLUTION INTRAMUSCULAR; INTRAVENOUS; SUBCUTANEOUS at 14:02

## 2024-11-05 RX ADMIN — Medication 50 MILLIGRAM(S): at 05:53

## 2024-11-05 RX ADMIN — Medication 20 MILLIGRAM(S): at 22:15

## 2024-11-05 RX ADMIN — Medication 60 MILLIGRAM(S): at 05:53

## 2024-11-05 RX ADMIN — SODIUM CHLORIDE 3 MILLILITER(S): 9 INJECTION, SOLUTION INTRAMUSCULAR; INTRAVENOUS; SUBCUTANEOUS at 22:00

## 2024-11-05 RX ADMIN — PANTOPRAZOLE SODIUM 40 MILLIGRAM(S): 40 TABLET, DELAYED RELEASE ORAL at 05:53

## 2024-11-05 RX ADMIN — SODIUM CHLORIDE 3 MILLILITER(S): 9 INJECTION, SOLUTION INTRAMUSCULAR; INTRAVENOUS; SUBCUTANEOUS at 05:44

## 2024-11-05 RX ADMIN — Medication 650 MILLIGRAM(S): at 05:53

## 2024-11-05 RX ADMIN — Medication 1 TABLET(S): at 11:07

## 2024-11-05 RX ADMIN — Medication 2: at 13:41

## 2024-11-05 RX ADMIN — Medication 8 UNIT(S): at 09:40

## 2024-11-05 RX ADMIN — CHLORHEXIDINE GLUCONATE 1 APPLICATION(S): 40 SOLUTION TOPICAL at 17:37

## 2024-11-05 RX ADMIN — LOSARTAN POTASSIUM 50 MILLIGRAM(S): 25 TABLET ORAL at 05:53

## 2024-11-05 RX ADMIN — Medication 4: at 18:04

## 2024-11-05 RX ADMIN — Medication 650 MILLIGRAM(S): at 06:53

## 2024-11-05 RX ADMIN — POLYETHYLENE GLYCOL 3350 17 GRAM(S): 17 POWDER, FOR SOLUTION ORAL at 11:07

## 2024-11-05 RX ADMIN — Medication 2: at 09:39

## 2024-11-05 ASSESSMENT — REFRACTION_AUTOREFRACTION
OS_SPHERE: +0.25
OS_CYLINDER: -2.25
OD_SPHERE: -0.75
OS_AXIS: 113
OD_AXIS: 051
OD_CYLINDER: -1.75

## 2024-11-05 ASSESSMENT — REFRACTION_MANIFEST
OD_SPHERE: -0.75
OD_VA1: 20/80
OS_AXIS: 115
OS_VA1: 20/40
OS_SPHERE: PL
OS_CYLINDER: -1.50
OD_CYLINDER: -1.25
OD_AXIS: 50

## 2024-11-05 ASSESSMENT — VISUAL ACUITY
OD_BCVA: 20/50-2
OS_BCVA: 20/100-

## 2024-11-05 NOTE — PROGRESS NOTE ADULT - SUBJECTIVE AND OBJECTIVE BOX
HPI:    HPI: 63M with PMH of DM, HTN, HLD, GERD, on ASA81 daily, known history of pituitary tumor diagnosed 8 years ago in [facility] with no follow up since then, presents with generalized weakness x 1 day. Patient reports difficulty getting out of his chair with unsteady gait since this morning. Patient currently c/o mild headache and burning with urination. Denies any recent falls/trauma, vision changes, dizziness, nausea/vomiting, numbness, or tingling. 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 Pt also noted to have TWI on EKG, cards consulted and further w/u pending. Pt reportedly claims to be compliant on home cardiac medications. Pt admitted to neurosurgery for likely surgical resection of pituitary tumor with Dr. Harmon.    PAST MEDICAL & SURGICAL HISTORY:  HTN (hypertension)  HLD (hyperlipidemia)  DM (diabetes mellitus)  GERD (gastroesophageal reflux disease)  H/O: pituitary tumor    No significant past surgical history    FAMILY HISTORY:  No pertinent family history in first degree relatives    Allergies  No Known Allergies  Intolerances    REVIEW OF SYSTEMS  As noted in HPI    HOME MEDICATIONS:  Home Medications:  atorvastatin 20 mg oral tablet: 1 tab(s) orally once a day (at bedtime) (28 Oct 2024 16:14)  losartan 50 mg oral tablet: 1 tab(s) orally once a day (28 Oct 2024 16:14)  metFORMIN 500 mg oral tablet: 1 tab(s) orally 2 times a day (28 Oct 2024 16:14)  metoprolol succinate 50 mg oral tablet, extended release: 1 tab(s) orally 2 times a day (28 Oct 2024 16:14)  NIFEdipine (Eqv-Adalat CC) 60 mg oral tablet, extended release: 1 tab(s) orally once a day (28 Oct 2024 16:14)      MEDICATIONS:  Antibiotics:    Neuro:  acetaminophen     Tablet .. 650 milliGRAM(s) Oral every 6 hours PRN  oxyCODONE    IR 5 milliGRAM(s) Oral every 6 hours PRN    Anticoagulation:    OTHER:  atorvastatin 20 milliGRAM(s) Oral at bedtime  dextrose 50% Injectable 25 Gram(s) IV Push once  dextrose 50% Injectable 25 Gram(s) IV Push once  dextrose 50% Injectable 12.5 Gram(s) IV Push once  dextrose Oral Gel 15 Gram(s) Oral once PRN  glucagon  Injectable 1 milliGRAM(s) IntraMuscular once  insulin lispro (ADMELOG) corrective regimen sliding scale   SubCutaneous three times a day before meals  insulin lispro (ADMELOG) corrective regimen sliding scale   SubCutaneous at bedtime  losartan 50 milliGRAM(s) Oral daily  metoprolol succinate ER 50 milliGRAM(s) Oral two times a day  NIFEdipine XL 60 milliGRAM(s) Oral daily  pantoprazole    Tablet 40 milliGRAM(s) Oral before breakfast  polyethylene glycol 3350 17 Gram(s) Oral daily  senna 2 Tablet(s) Oral at bedtime    IVF:  dextrose 5%. 1000 milliLiter(s) IV Continuous <Continuous>  dextrose 5%. 1000 milliLiter(s) IV Continuous <Continuous>  multivitamin 1 Tablet(s) Oral daily  sodium chloride 0.9% lock flush 3 milliLiter(s) IV Push every 8 hours      Vital Signs Last 24 Hrs  T(C): 36.7 (28 Oct 2024 15:19), Max: 37.1 (27 Oct 2024 16:37)  T(F): 98.1 (28 Oct 2024 15:19), Max: 98.7 (27 Oct 2024 16:37)  HR: 68 (28 Oct 2024 15:19) (61 - 98)  BP: 138/88 (28 Oct 2024 15:19) (130/68 - 162/88)  BP(mean): --  RR: 19 (28 Oct 2024 15:19) (16 - 19)  SpO2: 92% (28 Oct 2024 15:19) (92% - 96%)    Parameters below as of 28 Oct 2024 07:55  Patient On (Oxygen Delivery Method): room air   (28 Oct 2024 16:19)        INTERVAL HPI/OVERNIGHT EVENTS:  63y Male seen and examined at bedside. Patient neurologically stable. Currently in transit for neuropathology visit for peripheral field testing. Pre-op for OR for pituitary tumor resection.       Vital Signs Last 24 Hrs  T(C): 37.3 (05 Nov 2024 05:17), Max: 37.6 (05 Nov 2024 00:45)  T(F): 99.1 (05 Nov 2024 05:17), Max: 99.6 (05 Nov 2024 00:45)  HR: 65 (05 Nov 2024 05:17) (56 - 69)  BP: 147/90 (05 Nov 2024 05:17) (109/69 - 153/9)  BP(mean): 85 (05 Nov 2024 00:45) (85 - 85)  RR: 18 (05 Nov 2024 05:17) (18 - 18)  SpO2: 95% (05 Nov 2024 05:17) (93% - 98%)    Parameters below as of 05 Nov 2024 05:17  Patient On (Oxygen Delivery Method): room air    PHYSICAL EXAM:  General: NAD  Cardio:+S1 +S2  Lungs: normal chest rise and expansion   Neuro: AOX4, speech is clear & fluent without dysarthria and aphasia, MONTERO AG 5/5 throughout, sensation and strength intact distally and proximally, no pronator drift or facial droop appreciated, PERRL, EOMI without nystagmus, Peripheral vision loss noted upon visual field testing,, no focal deficits appreciated, no calf tenderness appreciated on palpation b/l.      LABS:    11-04 @ 07:01  -  11-05 @ 07:00  --------------------------------------------------------  IN: 0 mL / OUT: 700 mL / NET: -700 mL        RADIOLOGY & ADDITIONAL TESTS:     US Duplex Venous Lower Ext Complete, Bilateral (11.02.24 @ 18:52)   IMPRESSION:  The bilateral peroneal, soleal and gastrocnemius veins were not   visualized. Otherwise, no evidence of bilateral lower extremity deep vein   thrombosis in the visualized veins.      MR Sella alone w/wo IV Cont (10.28.24 @ 07:26) >    IMPRESSION:    1. 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    2. BRAIN:   Ischemic white matter disease greater than typical for age.   Diffuse brain volume loss typical for age.

## 2024-11-05 NOTE — CONSULT NOTE ADULT - SUBJECTIVE AND OBJECTIVE BOX
CC: Pituitary lesion     HPI: 63M with PMH of DM, HTN, HLD, GERD, on ASA81 daily, known history of pituitary tumor diagnosed 8 years ago in [facility] with no follow up since then, presents with generalized weakness x 1 day. Patient reports difficulty getting out of his chair with unsteady gait since this morning. Patient currently c/o mild headache and burning with urination. Denies any recent falls/trauma, vision changes, dizziness, nausea/vomiting, numbness, or tingling. 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 Pt also noted to have TWI on EKG, cards consulted and further w/u pending. Pt reportedly claims to be compliant on home cardiac medications. Pt admitted to neurosurgery for surgical resection of pituitary tumor with Dr. Harmon and Dr. Baeza  Pt interviewed and consented for procedure with [employer]  [place] die[place]. Procedure explained and all questions answered      PAST MEDICAL & SURGICAL HISTORY:  HTN (hypertension)      HLD (hyperlipidemia)      DM (diabetes mellitus)      GERD (gastroesophageal reflux disease)      H/O: pituitary tumor      No significant past surgical history        Allergies    No Known Allergies    Intolerances      MEDICATIONS  (STANDING):  atorvastatin 20 milliGRAM(s) Oral at bedtime  dextrose 5%. 1000 milliLiter(s) (100 mL/Hr) IV Continuous <Continuous>  dextrose 5%. 1000 milliLiter(s) (50 mL/Hr) IV Continuous <Continuous>  dextrose 50% Injectable 25 Gram(s) IV Push once  dextrose 50% Injectable 25 Gram(s) IV Push once  dextrose 50% Injectable 12.5 Gram(s) IV Push once  enoxaparin Injectable 40 milliGRAM(s) SubCutaneous every 24 hours  glucagon  Injectable 1 milliGRAM(s) IntraMuscular once  influenza   Vaccine 0.5 milliLiter(s) IntraMuscular once  insulin glargine Injectable (LANTUS) 8 Unit(s) SubCutaneous at bedtime  insulin lispro (ADMELOG) corrective regimen sliding scale   SubCutaneous at bedtime  insulin lispro (ADMELOG) corrective regimen sliding scale   SubCutaneous three times a day before meals  insulin lispro Injectable (ADMELOG) 8 Unit(s) SubCutaneous three times a day before meals  losartan 50 milliGRAM(s) Oral daily  metoprolol succinate ER 50 milliGRAM(s) Oral two times a day  multivitamin 1 Tablet(s) Oral daily  NIFEdipine XL 60 milliGRAM(s) Oral daily  pantoprazole    Tablet 40 milliGRAM(s) Oral before breakfast  polyethylene glycol 3350 17 Gram(s) Oral daily  senna 2 Tablet(s) Oral at bedtime  sodium chloride 0.9% lock flush 3 milliLiter(s) IV Push every 8 hours    MEDICATIONS  (PRN):  acetaminophen     Tablet .. 650 milliGRAM(s) Oral every 6 hours PRN Moderate Pain (4 - 6)  dextrose Oral Gel 15 Gram(s) Oral once PRN Blood Glucose LESS THAN 70 milliGRAM(s)/deciliter      Social History: No reported toxic habits    Family history:    No pertinent family history in first degree relatives        ROS:   ENT: all negative except as noted in HPI   Skin: No itching, dryness, rash, changes to hair, or skin masses  CV: denies palpitations  Pulm: denies SOB, cough, hemoptysis  GI: denies change in appetite, indigestion, n/v  : denies pertinent urinary symptoms, urgency  Neuro: denies numbness/tingling, loss of sensation  Psych: denies anxiety  MS: denies muscle weakness, instability  Heme: denies easy bruising or bleeding  Endo: denies heat/cold intolerance, excessive sweating  Vascular: denies LE edema    Vital Signs Last 24 Hrs  T(C): 37.3 (05 Nov 2024 05:17), Max: 37.6 (05 Nov 2024 00:45)  T(F): 99.1 (05 Nov 2024 05:17), Max: 99.6 (05 Nov 2024 00:45)  HR: 65 (05 Nov 2024 05:17) (56 - 69)  BP: 147/90 (05 Nov 2024 05:17) (109/69 - 153/9)  BP(mean): 85 (05 Nov 2024 00:45) (85 - 85)  RR: 18 (05 Nov 2024 05:17) (18 - 18)  SpO2: 95% (05 Nov 2024 05:17) (93% - 98%)    Parameters below as of 05 Nov 2024 05:17  Patient On (Oxygen Delivery Method): room air         PHYSICAL EXAM:  Gen: NAD  Skin: No rashes, bruises, or lesions  Head: Normocephalic, Atraumatic  Face: no edema, erythema, or fluctuance. Parotid glands soft without mass  Eyes: no scleral injection  Nose: Nares bilaterally patent, no discharge  Mouth: No Stridor / Drooling / Trismus.  Mucosa moist, tongue/uvula midline, oropharynx clear  Neck: Flat, supple, no lymphadenopathy, trachea midline, no masses  Lymphatic: No lymphadenopathy  Resp: breathing easily, no stridor  CV: no peripheral edema/cyanosis  GI: nondistended   Peripheral vascular: no JVD or edema  Neuro: facial nerve intact, no facial droop        IMAGING/ADDITIONAL STUDIES:   < from: MR Sella alone w/wo IV Cont (10.28.24 @ 07:26) >    ACC: [id number] EXAM:  MR SELLA ONLY WAW IC   ORDERED BY: KELLY SEWELL     ACC: [id number] EXAM:  MR BRAIN WAW IC   ORDERED BY: JANET ROMERO     PROCEDURE DATE:  10/28/2024          INTERPRETATION:  MR of the pituitary/brain with and without gadolinium   contrast    CLINICAL INFORMATION:   brain tumor    mr w pituitary focus  XMR    TECHNIQUE:   Sagittal T1-weighted images, axial FLAIR images and axial   diffusion weighted images of the brain were obtained.  High-resolution   images were obtained through the pituitary including coronal T1-weighted   and T2-weighted imaging.   Coronal T1-weighted images obtained as a   dynamic acquisition preceding and during the rapid bolus administration   of gadolinium at multiple time points.  Directly following sagittal and   coronal postgadolinium images of the pituitary were obtained.   Axial   post gadolinium volumetric T1 weighted images the brain were obtained and   reconstructed in the sagittal and coronal planes.  CONTRAST:    Gadavist:     10 cc administered  ;  0 cc discarded    COMPARISON:   CT head and CT angiography preceding day    FINDINGS:    PITUITARY    Large pituitary tumor expands and distorts the sella. This lesion extends   upward obliterating the suprasellar cistern and considerably elevating   the optic chiasm. The anterior cerebral arteries are displaced right to   left, anterior to posterior and inferior to superior, without apparent   narrowing of the lesion extends within the right cavernous sinus lateral   to the apex of the internal carotid artery. At the left cavernous sinus   tumor remains medial to the internal carotid artery. The lesion extends   downward remodeling the sella or for an extensively invading the right   sphenoid sinus. Within the further lateral pterygoid recesses and   posterior superior and aspect of the sphenoid sinus T1 hyperintense   material suggests trapped secretions. Tumor appears to perforate the   sphenoid septum with right to left extension of tumor into the left   sphenoid sinus.The tumor is heterogeneous on the long TR images with   hyperintensity near its vertex but differentially greater enhancement,   suggesting cystic and vascular tumor. Overall the lesion measures   approximately 4.8 cm in height x 3.3 cm AP x 8 2.9 cmtransverse in   maximum dimensions. The elevated ventral forebrain is distorted and   demonstrates no signal intensity change or evidence of invasion.    BRAIN    BRAIN PARENCHYMA:   The brain demonstrates patchy and confluent lesions   within the deep cerebral hemispheric white matter typical for ischemic   white matter disease. These lesions are hyperintense on the long TR   images, hypointense on T1-weighted images are most confluent, otherwise   inconspicuous. These lesions most extensively involve the posterior   centrum semiovale, periatrial and posterior periventricular white matter.   Additional subcortical and deeper lesions are present. At least mild   ventral pontine involvement is present. No diffusion restriction is found   in the brain.  No acute cerebral cortical infarct is found.   No   intracranial hemorrhage is recognized.  No mass effect is found in the   brain. No abnormal enhancement is noted intrinsic to the brain.    CSF SPACES:   The ventricles, sulci and basal cisterns appear mildly   dilated reflecting diffuse brain volume loss.    VESSELS:   The principal dural sinuses enhance indicating their patency.    The vertebral and internal carotid arteries demonstrate expected flow   voids indicating their patency.    HEAD AND NECK STRUCTURES:   The orbits are unremarkable.  Paranasal   sinuses also demonstrate lobulated mucosal disease within the maxillary   sinuses, ethmoid air cells and frontal sinuses at each nasofrontal   recess.  The nasal cavity appears intact.   The nasopharynx is symmetric.    The temporal bones appear clear of disease.  The calvarium appears   unremarkable.      IMPRESSION:    1. 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    2. BRAIN:   Ischemic white matter disease greater than typical for age.   Diffuse brain volume loss typical for age.    --- End ofReport ---

## 2024-11-05 NOTE — PROGRESS NOTE ADULT - SUBJECTIVE AND OBJECTIVE BOX
HOSPITALIST PROGRESS NOTE    SAI HENDRIX  [id number]  63yMale    Patient is a 63y old  Male who presents with a chief complaint of Pituitary Tumor (05 Nov 2024 10:58)      SUBJECTIVE:   Chart reviewed since last visit.   Patient seen and examined at bedside for pituitary mass.  No new complaints - awaiting surgery      OBJECTIVE:  Vital Signs Last 24 Hrs  T(C): 37.1 (05 Nov 2024 12:03), Max: 37.6 (05 Nov 2024 00:45)  T(F): 98.7 (05 Nov 2024 12:03), Max: 99.6 (05 Nov 2024 00:45)  HR: 64 (05 Nov 2024 12:03) (56 - 69)  BP: 111/70 (05 Nov 2024 12:03) (109/69 - 147/90)  BP(mean): 85 (05 Nov 2024 00:45) (85 - 85)  RR: 19 (05 Nov 2024 12:03) (18 - 19)  SpO2: 98% (05 Nov 2024 12:03) (93% - 98%)    Parameters below as of 05 Nov 2024 12:03  Patient On (Oxygen Delivery Method): room air        PHYSICAL EXAMINATION  General: Middle aged male sitting up in chair.  HEENT:  extraocular movements intact pupils equal, responsive, reactive to light and accomodation   NECK:  Supple  CVS: regular rate and rhythm S1 S2  RESP:  Fair air entry bilaterally  GI:  Soft nondistended nontender BS+  : No suprapubic tenderness  MSK:  Moves all extremities  CNS: Awake, alert, oriented. Follows simple commands, fluent speech  INTEG:  Warm skin  PSYCH:  Fair mood    MONITOR:  CAPILLARY BLOOD GLUCOSE      POCT Blood Glucose.: 152 mg/dL (05 Nov 2024 13:32)  POCT Blood Glucose.: 176 mg/dL (05 Nov 2024 09:37)  POCT Blood Glucose.: 180 mg/dL (04 Nov 2024 22:43)  POCT Blood Glucose.: 160 mg/dL (04 Nov 2024 18:32)        I&O's Summary    04 Nov 2024 07:01  -  05 Nov 2024 07:00  --------------------------------------------------------  IN: 0 mL / OUT: 700 mL / NET: -700 mL                            TTE:    RADIOLOGY        MEDICATIONS  (STANDING):  atorvastatin 20 milliGRAM(s) Oral at bedtime  ceFAZolin  Injectable. 2000 milliGRAM(s) IV Push once  chlorhexidine 4% Liquid 1 Application(s) Topical every 12 hours  dextrose 5%. 1000 milliLiter(s) (100 mL/Hr) IV Continuous <Continuous>  dextrose 5%. 1000 milliLiter(s) (50 mL/Hr) IV Continuous <Continuous>  dextrose 50% Injectable 25 Gram(s) IV Push once  dextrose 50% Injectable 25 Gram(s) IV Push once  dextrose 50% Injectable 12.5 Gram(s) IV Push once  glucagon  Injectable 1 milliGRAM(s) IntraMuscular once  influenza   Vaccine 0.5 milliLiter(s) IntraMuscular once  insulin glargine Injectable (LANTUS) 4 Unit(s) SubCutaneous at bedtime  insulin lispro (ADMELOG) corrective regimen sliding scale   SubCutaneous three times a day before meals  insulin lispro (ADMELOG) corrective regimen sliding scale   SubCutaneous at bedtime  insulin lispro Injectable (ADMELOG) 8 Unit(s) SubCutaneous three times a day before meals  losartan 50 milliGRAM(s) Oral daily  metoprolol succinate ER 50 milliGRAM(s) Oral two times a day  multivitamin 1 Tablet(s) Oral daily  NIFEdipine XL 60 milliGRAM(s) Oral daily  pantoprazole    Tablet 40 milliGRAM(s) Oral before breakfast  polyethylene glycol 3350 17 Gram(s) Oral daily  povidone iodine 10% Nasal Swab 1 Application(s) Both Nostrils once  senna 2 Tablet(s) Oral at bedtime  sodium chloride 0.9% lock flush 3 milliLiter(s) IV Push every 8 hours      MEDICATIONS  (PRN):  acetaminophen     Tablet .. 650 milliGRAM(s) Oral every 6 hours PRN Moderate Pain (4 - 6)  dextrose Oral Gel 15 Gram(s) Oral once PRN Blood Glucose LESS THAN 70 milliGRAM(s)/deciliter

## 2024-11-05 NOTE — CONSULT NOTE ADULT - ASSESSMENT
63M with PMH of DM, HTN, HLD, GERD, on ASA81 daily, known history of pituitary tumor diagnosed 8 years ago in [facility] with no follow up since then, presents with generalized weakness x 1 day. Neurosurgery admitting for 2.7 x 2.7 x 3.9 cm pituitary mass with chiasmatic encroachment.

## 2024-11-05 NOTE — PROGRESS NOTE ADULT - ASSESSMENT
63M with PMH of DM, HTN, HLD, GERD, on ASA81 daily, known history of pituitary tumor diagnosed 8 years ago in [facility] with no follow up since then, presents with generalized weakness x 1 day.   Neurosurgery admitted for 2.7 x 2.7 x 3.9 cm pituitary mass with chiasmatic encroachment as noted on MRI  Plan:  - Q4 neuro checks   -patient is medically optimized for OR/ ENDOSCOPIC TRANSPHENOIDAL RESECTION OF PITUITARY MASS on Wednesday 11/6 at 12:00. Patient will be going to NSICU post-operatively  - All imaging reviewed: CTH and MRI Brain shows 2.7 x 2.7 x 3.9 cm pituitary mass with chiasmatic encroachment  - Endocrine recs appreciated; endocrinology following and hormonal work-up  - patient optimized and cleared by cardiology on 10/28/24  - SBP goal <160  - Resume home HTN/HLD meds  defer nonsurgical management to medical team  -continue to hold aspirin.   - Bowel Regimen: Senna and Miralax  - Preop for OR tomorrow.   - DVT ppx: SCDs & HOLD LOVENOX FOR TONIGHT.   -Patient in transit for Neurooptho evaluation. F/u with peripheral visual field testing results.

## 2024-11-05 NOTE — PROGRESS NOTE ADULT - SUBJECTIVE AND OBJECTIVE BOX
INTERVAL EVENTS:  Follow up pituitary macroadenoma, diabetes management. Glucoses improving, pt reports no acute complaints.     MEDICATIONS  (STANDING):  atorvastatin 20 milliGRAM(s) Oral at bedtime  ceFAZolin   IVPB 2000 milliGRAM(s) IV Intermittent once  chlorhexidine 4% Liquid 1 Application(s) Topical every 12 hours  dextrose 5%. 1000 milliLiter(s) (100 mL/Hr) IV Continuous <Continuous>  dextrose 5%. 1000 milliLiter(s) (50 mL/Hr) IV Continuous <Continuous>  dextrose 50% Injectable 12.5 Gram(s) IV Push once  dextrose 50% Injectable 25 Gram(s) IV Push once  dextrose 50% Injectable 25 Gram(s) IV Push once  glucagon  Injectable 1 milliGRAM(s) IntraMuscular once  influenza   Vaccine 0.5 milliLiter(s) IntraMuscular once  insulin glargine Injectable (LANTUS) 8 Unit(s) SubCutaneous at bedtime  insulin lispro (ADMELOG) corrective regimen sliding scale   SubCutaneous at bedtime  insulin lispro (ADMELOG) corrective regimen sliding scale   SubCutaneous three times a day before meals  insulin lispro Injectable (ADMELOG) 8 Unit(s) SubCutaneous three times a day before meals  losartan 50 milliGRAM(s) Oral daily  metoprolol succinate ER 50 milliGRAM(s) Oral two times a day  multivitamin 1 Tablet(s) Oral daily  NIFEdipine XL 60 milliGRAM(s) Oral daily  pantoprazole    Tablet 40 milliGRAM(s) Oral before breakfast  polyethylene glycol 3350 17 Gram(s) Oral daily  povidone iodine 10% Nasal Swab 1 Application(s) Both Nostrils once  senna 2 Tablet(s) Oral at bedtime  sodium chloride 0.9% lock flush 3 milliLiter(s) IV Push every 8 hours    MEDICATIONS  (PRN):  acetaminophen     Tablet .. 650 milliGRAM(s) Oral every 6 hours PRN Moderate Pain (4 - 6)  dextrose Oral Gel 15 Gram(s) Oral once PRN Blood Glucose LESS THAN 70 milliGRAM(s)/deciliter    Allergies  No Known Allergies    Vital Signs Last 24 Hrs  T(C): 37.3 (05 Nov 2024 05:17), Max: 37.6 (05 Nov 2024 00:45)  T(F): 99.1 (05 Nov 2024 05:17), Max: 99.6 (05 Nov 2024 00:45)  HR: 65 (05 Nov 2024 05:17) (56 - 69)  BP: 147/90 (05 Nov 2024 05:17) (109/69 - 153/9)  BP(mean): 85 (05 Nov 2024 00:45) (85 - 85)  RR: 18 (05 Nov 2024 05:17) (18 - 18)  SpO2: 95% (05 Nov 2024 05:17) (93% - 98%)    Parameters below as of 05 Nov 2024 05:17  Patient On (Oxygen Delivery Method): room air    PHYSICAL EXAM:  General: No apparent distress  Respiratory: Lungs clear bilaterally  Cardiac: +S1, S2, no m/r/g  GI: +BS, soft, non tender, non distended  Extremities: No peripheral edema, no pedal lesions  Neuro: A+O X3    POCT Blood Glucose.: 176 mg/dL (11-05-24 @ 09:37)  POCT Blood Glucose.: 180 mg/dL (11-04-24 @ 22:43)  POCT Blood Glucose.: 160 mg/dL (11-04-24 @ 18:32)  POCT Blood Glucose.: 318 mg/dL (11-04-24 @ 12:55)    Free Thyroxine, Serum: 1.1 ng/dL (10-30-24 @ 05:00)  Thyroid Stimulating Hormone, Serum: 0.60 uIU/mL (10-29-24 @ 05:12)  Free Thyroxine, Serum: 1.0 ng/dL (10-29-24 @ 05:12)  Thyroid Stimulating Hormone, Serum: 0.38 uIU/mL (10-28-24 @ 00:47)

## 2024-11-05 NOTE — PROGRESS NOTE ADULT - ASSESSMENT
63M with PMH of DM, HTN, HLD, GERD, on ASA81 daily, known history of pituitary tumor diagnosed 8 years ago in [facility] with no follow up since then, presents with generalized weakness x 1 day and found to have large pituitary mass extensively   elevates and distorts the optic chiasm and forebrain, invades the sphenoid sinus to the right of midline and likely invades the right cavernous sinus.    1. Pituitary macroadenoma with optic chiasm compression  - Hormonal workup normal --> non functioning pituitary macroadenoma  - Surgical plan as per neurosurgery, for OR on Wednesday  - Post operative DI watch    2. T2DM, a1c 7.1%- glucoses improving  - Continue admelog 8 units TID (hold when NPO)  - Continue lantus 8 units qhs  - Correction scale    3. HLD  - Continue statin

## 2024-11-05 NOTE — CONSULT NOTE ADULT - PROBLEM SELECTOR RECOMMENDATION 9
-NPO tonight in preparation for surgery tomorrow with Dr. Baeza and Dr. Harmon  -CT sinus non con ordered for navigation   -ENT surgical consent in chart. [place] creole  used  -Medical/Cardiac clearance as needed

## 2024-11-05 NOTE — PROGRESS NOTE ADULT - ASSESSMENT
63 M with PMH of DM, HTN, HLD, GERD, on ASA81 daily, known history of pituitary tumor diagnosed 8 years ago in [facility] with no follow up since then, presents with generalized weakness x 1 day. Patient reports difficulty getting out of his chair with unsteady gait since this morning. Patient currently c/o mild headache and burning with urination. Denies any recent falls/trauma, vision changes, dizziness, nausea/vomiting, numbness, or tingling. 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 Pt also noted to have TWI on EKG, cards consulted and further w/u pending. Pt reportedly claims to be compliant on home cardiac medications. Pt admitted to neurosurgery for likely surgical resection of pituitary tumor with Dr. Harmon.       # Pituitary tumor   - MRI brain reviewed: Large pituitary tumor, likely adenoma, extensively elevates and distorts the optic chiasm and forebrain, invades the sphenoid sinus to the right of midline and likely invades the right cavernous sinus.  - Neuro checks   - NSG plan for surgical resection this admission, likely on 11/6 Wednesday  - Neuro ophtho eval done 10/31 per NSG   - SBP goal <160  - Hormonal evaluation per Endocrine   - Pt is medically optimized for Surgery     # Anemia, normocytic  - Hb is stable     # Abnormal EKG   - TTE reviewed   - Cardiac CTA done: Minimal non-obstructive CAD   - Cardio on board     # DM   A1C 7.2   Uncontrolled  - Blood glucose monitoring with sliding scale Lispro  - Increase basal and bolus Insulins as per Endocrinology recommendations     # HTN  # HLD   - Toprol XL, Losartan, Nifedipine  - statin     DVT ppx - SCDs     Dispo: pending surgery, remains medically active.

## 2024-11-06 ENCOUNTER — APPOINTMENT (OUTPATIENT)
Dept: NEUROSURGERY | Facility: HOSPITAL | Age: 64
End: 2024-11-06

## 2024-11-06 ENCOUNTER — RESULT REVIEW (OUTPATIENT)
Age: 64
End: 2024-11-06

## 2024-11-06 LAB
ABO RH CONFIRMATION: SIGNIFICANT CHANGE UP
ANION GAP SERPL CALC-SCNC: 13 MMOL/L — SIGNIFICANT CHANGE UP (ref 5–17)
ANION GAP SERPL CALC-SCNC: 13 MMOL/L — SIGNIFICANT CHANGE UP (ref 5–17)
ANION GAP SERPL CALC-SCNC: 14 MMOL/L — SIGNIFICANT CHANGE UP (ref 5–17)
APTT BLD: 28.6 SEC — SIGNIFICANT CHANGE UP (ref 24.5–35.6)
BLD GP AB SCN SERPL QL: SIGNIFICANT CHANGE UP
BUN SERPL-MCNC: 13.6 MG/DL — SIGNIFICANT CHANGE UP (ref 8–20)
BUN SERPL-MCNC: 14.7 MG/DL — SIGNIFICANT CHANGE UP (ref 8–20)
BUN SERPL-MCNC: 14.9 MG/DL — SIGNIFICANT CHANGE UP (ref 8–20)
CALCIUM SERPL-MCNC: 8.4 MG/DL — SIGNIFICANT CHANGE UP (ref 8.4–10.5)
CALCIUM SERPL-MCNC: 8.6 MG/DL — SIGNIFICANT CHANGE UP (ref 8.4–10.5)
CALCIUM SERPL-MCNC: 8.6 MG/DL — SIGNIFICANT CHANGE UP (ref 8.4–10.5)
CHLORIDE SERPL-SCNC: 101 MMOL/L — SIGNIFICANT CHANGE UP (ref 96–108)
CHLORIDE SERPL-SCNC: 104 MMOL/L — SIGNIFICANT CHANGE UP (ref 96–108)
CHLORIDE SERPL-SCNC: 97 MMOL/L — SIGNIFICANT CHANGE UP (ref 96–108)
CO2 SERPL-SCNC: 21 MMOL/L — LOW (ref 22–29)
CO2 SERPL-SCNC: 22 MMOL/L — SIGNIFICANT CHANGE UP (ref 22–29)
CO2 SERPL-SCNC: 24 MMOL/L — SIGNIFICANT CHANGE UP (ref 22–29)
CREAT SERPL-MCNC: 0.89 MG/DL — SIGNIFICANT CHANGE UP (ref 0.5–1.3)
CREAT SERPL-MCNC: 0.89 MG/DL — SIGNIFICANT CHANGE UP (ref 0.5–1.3)
CREAT SERPL-MCNC: 0.99 MG/DL — SIGNIFICANT CHANGE UP (ref 0.5–1.3)
EGFR: 86 ML/MIN/1.73M2 — SIGNIFICANT CHANGE UP
EGFR: 96 ML/MIN/1.73M2 — SIGNIFICANT CHANGE UP
EGFR: 96 ML/MIN/1.73M2 — SIGNIFICANT CHANGE UP
GLUCOSE BLDC GLUCOMTR-MCNC: 152 MG/DL — HIGH (ref 70–99)
GLUCOSE BLDC GLUCOMTR-MCNC: 157 MG/DL — HIGH (ref 70–99)
GLUCOSE BLDC GLUCOMTR-MCNC: 298 MG/DL — HIGH (ref 70–99)
GLUCOSE BLDC GLUCOMTR-MCNC: 300 MG/DL — HIGH (ref 70–99)
GLUCOSE SERPL-MCNC: 145 MG/DL — HIGH (ref 70–99)
GLUCOSE SERPL-MCNC: 269 MG/DL — HIGH (ref 70–99)
GLUCOSE SERPL-MCNC: 306 MG/DL — HIGH (ref 70–99)
HCT VFR BLD CALC: 30.7 % — LOW (ref 39–50)
HGB BLD-MCNC: 10.1 G/DL — LOW (ref 13–17)
INR BLD: 1.01 RATIO — SIGNIFICANT CHANGE UP (ref 0.85–1.16)
MAGNESIUM SERPL-MCNC: 1.8 MG/DL — SIGNIFICANT CHANGE UP (ref 1.6–2.6)
MCHC RBC-ENTMCNC: 28.2 PG — SIGNIFICANT CHANGE UP (ref 27–34)
MCHC RBC-ENTMCNC: 32.9 G/DL — SIGNIFICANT CHANGE UP (ref 32–36)
MCV RBC AUTO: 85.8 FL — SIGNIFICANT CHANGE UP (ref 80–100)
PHOSPHATE SERPL-MCNC: 3.7 MG/DL — SIGNIFICANT CHANGE UP (ref 2.4–4.7)
PLATELET # BLD AUTO: 195 K/UL — SIGNIFICANT CHANGE UP (ref 150–400)
POTASSIUM SERPL-MCNC: 4.1 MMOL/L — SIGNIFICANT CHANGE UP (ref 3.5–5.3)
POTASSIUM SERPL-MCNC: 4.2 MMOL/L — SIGNIFICANT CHANGE UP (ref 3.5–5.3)
POTASSIUM SERPL-MCNC: 4.3 MMOL/L — SIGNIFICANT CHANGE UP (ref 3.5–5.3)
POTASSIUM SERPL-SCNC: 4.1 MMOL/L — SIGNIFICANT CHANGE UP (ref 3.5–5.3)
POTASSIUM SERPL-SCNC: 4.2 MMOL/L — SIGNIFICANT CHANGE UP (ref 3.5–5.3)
POTASSIUM SERPL-SCNC: 4.3 MMOL/L — SIGNIFICANT CHANGE UP (ref 3.5–5.3)
PROTHROM AB SERPL-ACNC: 11.4 SEC — SIGNIFICANT CHANGE UP (ref 9.9–13.4)
RBC # BLD: 3.58 M/UL — LOW (ref 4.2–5.8)
RBC # FLD: 11.6 % — SIGNIFICANT CHANGE UP (ref 10.3–14.5)
SODIUM SERPL-SCNC: 133 MMOL/L — LOW (ref 135–145)
SODIUM SERPL-SCNC: 135 MMOL/L — SIGNIFICANT CHANGE UP (ref 135–145)
SODIUM SERPL-SCNC: 141 MMOL/L — SIGNIFICANT CHANGE UP (ref 135–145)
WBC # BLD: 7.25 K/UL — SIGNIFICANT CHANGE UP (ref 3.8–10.5)
WBC # FLD AUTO: 7.25 K/UL — SIGNIFICANT CHANGE UP (ref 3.8–10.5)

## 2024-11-06 PROCEDURE — 62165 REMOVE PITUIT TUMOR W/SCOPE: CPT | Mod: 62

## 2024-11-06 PROCEDURE — 61782 SCAN PROC CRANIAL EXTRA: CPT

## 2024-11-06 PROCEDURE — 88307 TISSUE EXAM BY PATHOLOGIST: CPT | Mod: 26

## 2024-11-06 PROCEDURE — 99221 1ST HOSP IP/OBS SF/LOW 40: CPT

## 2024-11-06 PROCEDURE — 88305 TISSUE EXAM BY PATHOLOGIST: CPT | Mod: 26

## 2024-11-06 PROCEDURE — 61781 SCAN PROC CRANIAL INTRA: CPT

## 2024-11-06 PROCEDURE — 15769 GRFG AUTOL SOFT TISS DIR EXC: CPT

## 2024-11-06 DEVICE — SURGICEL 2 X 14": Type: IMPLANTABLE DEVICE | Status: FUNCTIONAL

## 2024-11-06 DEVICE — SURGIFOAM PAD 8CM X 12.5CM X 10MM (100): Type: IMPLANTABLE DEVICE | Status: FUNCTIONAL

## 2024-11-06 DEVICE — GRAFT DURAL MATRX 1 X 3IN DURGN: Type: IMPLANTABLE DEVICE | Status: FUNCTIONAL

## 2024-11-06 DEVICE — SURGIFLO MATRIX WITH THROMBIN KIT: Type: IMPLANTABLE DEVICE | Status: FUNCTIONAL

## 2024-11-06 RX ORDER — INSULIN LISPRO 100/ML
VIAL (ML) SUBCUTANEOUS
Refills: 0 | Status: DISCONTINUED | OUTPATIENT
Start: 2024-11-06 | End: 2024-11-06

## 2024-11-06 RX ORDER — OXYCODONE HYDROCHLORIDE 30 MG/1
10 TABLET ORAL EVERY 4 HOURS
Refills: 0 | Status: DISCONTINUED | OUTPATIENT
Start: 2024-11-06 | End: 2024-11-08

## 2024-11-06 RX ORDER — POLYETHYLENE GLYCOL 3350 17 G/17G
17 POWDER, FOR SOLUTION ORAL DAILY
Refills: 0 | Status: DISCONTINUED | OUTPATIENT
Start: 2024-11-06 | End: 2024-11-08

## 2024-11-06 RX ORDER — INSULIN LISPRO 100/ML
8 VIAL (ML) SUBCUTANEOUS
Refills: 0 | Status: DISCONTINUED | OUTPATIENT
Start: 2024-11-06 | End: 2024-11-07

## 2024-11-06 RX ORDER — ACETAMINOPHEN 500 MG
650 TABLET ORAL EVERY 6 HOURS
Refills: 0 | Status: DISCONTINUED | OUTPATIENT
Start: 2024-11-06 | End: 2024-11-10

## 2024-11-06 RX ORDER — CEFAZOLIN SODIUM 1 G
2000 VIAL (EA) INJECTION EVERY 8 HOURS
Refills: 0 | Status: COMPLETED | OUTPATIENT
Start: 2024-11-06 | End: 2024-11-07

## 2024-11-06 RX ORDER — SODIUM CHLORIDE 9 MG/ML
1000 INJECTION, SOLUTION INTRAMUSCULAR; INTRAVENOUS; SUBCUTANEOUS
Refills: 0 | Status: DISCONTINUED | OUTPATIENT
Start: 2024-11-06 | End: 2024-11-07

## 2024-11-06 RX ORDER — CEFAZOLIN SODIUM 1 G
2000 VIAL (EA) INJECTION EVERY 8 HOURS
Refills: 0 | Status: DISCONTINUED | OUTPATIENT
Start: 2024-11-06 | End: 2024-11-06

## 2024-11-06 RX ORDER — CALCIUM CARBONATE 215(500)MG
1 TABLET,CHEWABLE ORAL EVERY 4 HOURS
Refills: 0 | Status: DISCONTINUED | OUTPATIENT
Start: 2024-11-06 | End: 2024-11-07

## 2024-11-06 RX ORDER — OXYCODONE HYDROCHLORIDE 30 MG/1
5 TABLET ORAL EVERY 4 HOURS
Refills: 0 | Status: DISCONTINUED | OUTPATIENT
Start: 2024-11-06 | End: 2024-11-10

## 2024-11-06 RX ORDER — METOPROLOL TARTRATE 50 MG
50 TABLET ORAL
Refills: 0 | Status: DISCONTINUED | OUTPATIENT
Start: 2024-11-06 | End: 2024-11-10

## 2024-11-06 RX ORDER — INSULIN GLARGINE,HUM.REC.ANLOG 100/ML
8 VIAL (ML) SUBCUTANEOUS AT BEDTIME
Refills: 0 | Status: DISCONTINUED | OUTPATIENT
Start: 2024-11-06 | End: 2024-11-07

## 2024-11-06 RX ORDER — INSULIN LISPRO 100/ML
VIAL (ML) SUBCUTANEOUS AT BEDTIME
Refills: 0 | Status: DISCONTINUED | OUTPATIENT
Start: 2024-11-06 | End: 2024-11-06

## 2024-11-06 RX ORDER — SENNA 187 MG
2 TABLET ORAL AT BEDTIME
Refills: 0 | Status: DISCONTINUED | OUTPATIENT
Start: 2024-11-06 | End: 2024-11-10

## 2024-11-06 RX ORDER — PANTOPRAZOLE SODIUM 40 MG/1
40 TABLET, DELAYED RELEASE ORAL
Refills: 0 | Status: DISCONTINUED | OUTPATIENT
Start: 2024-11-06 | End: 2024-11-10

## 2024-11-06 RX ORDER — ONDANSETRON HYDROCHLORIDE 2 MG/ML
4 INJECTION, SOLUTION INTRAMUSCULAR; INTRAVENOUS EVERY 6 HOURS
Refills: 0 | Status: DISCONTINUED | OUTPATIENT
Start: 2024-11-06 | End: 2024-11-10

## 2024-11-06 RX ORDER — INSULIN LISPRO 100/ML
VIAL (ML) SUBCUTANEOUS
Refills: 0 | Status: DISCONTINUED | OUTPATIENT
Start: 2024-11-06 | End: 2024-11-10

## 2024-11-06 RX ORDER — HYDROMORPHONE HCL/0.9% NACL/PF 6 MG/30 ML
0.5 PATIENT CONTROLLED ANALGESIA SYRINGE INTRAVENOUS EVERY 6 HOURS
Refills: 0 | Status: DISCONTINUED | OUTPATIENT
Start: 2024-11-06 | End: 2024-11-08

## 2024-11-06 RX ORDER — CALCIUM CARBONATE 215(500)MG
1 TABLET,CHEWABLE ORAL
Refills: 0 | Status: DISCONTINUED | OUTPATIENT
Start: 2024-11-06 | End: 2024-11-06

## 2024-11-06 RX ORDER — NIFEDIPINE 90 MG
60 TABLET, EXTENDED RELEASE 24 HR ORAL DAILY
Refills: 0 | Status: DISCONTINUED | OUTPATIENT
Start: 2024-11-06 | End: 2024-11-10

## 2024-11-06 RX ORDER — LABETALOL HCL 200 MG
10 TABLET ORAL
Refills: 0 | Status: DISCONTINUED | OUTPATIENT
Start: 2024-11-06 | End: 2024-11-08

## 2024-11-06 RX ORDER — HYDRALAZINE HYDROCHLORIDE 50 MG/1
10 TABLET, FILM COATED ORAL
Refills: 0 | Status: DISCONTINUED | OUTPATIENT
Start: 2024-11-06 | End: 2024-11-08

## 2024-11-06 RX ADMIN — LOSARTAN POTASSIUM 50 MILLIGRAM(S): 25 TABLET ORAL at 06:10

## 2024-11-06 RX ADMIN — Medication 50 MILLIGRAM(S): at 06:10

## 2024-11-06 RX ADMIN — Medication 8 UNIT(S): at 22:47

## 2024-11-06 RX ADMIN — Medication 20 MILLIGRAM(S): at 22:47

## 2024-11-06 RX ADMIN — Medication 650 MILLIGRAM(S): at 01:49

## 2024-11-06 RX ADMIN — Medication 650 MILLIGRAM(S): at 00:49

## 2024-11-06 RX ADMIN — Medication 6: at 22:47

## 2024-11-06 RX ADMIN — Medication 0.5 MILLIGRAM(S): at 16:15

## 2024-11-06 RX ADMIN — Medication 2 TABLET(S): at 22:47

## 2024-11-06 RX ADMIN — Medication 2: at 08:36

## 2024-11-06 RX ADMIN — POVIDONE-IODINE 1 APPLICATION(S): 0.07 SOLUTION TOPICAL at 11:24

## 2024-11-06 RX ADMIN — Medication 2000 MILLIGRAM(S): at 19:58

## 2024-11-06 RX ADMIN — SODIUM CHLORIDE 3 MILLILITER(S): 9 INJECTION, SOLUTION INTRAMUSCULAR; INTRAVENOUS; SUBCUTANEOUS at 06:31

## 2024-11-06 RX ADMIN — Medication 6: at 16:18

## 2024-11-06 RX ADMIN — Medication 60 MILLIGRAM(S): at 06:10

## 2024-11-06 RX ADMIN — PANTOPRAZOLE SODIUM 40 MILLIGRAM(S): 40 TABLET, DELAYED RELEASE ORAL at 06:10

## 2024-11-06 RX ADMIN — SODIUM CHLORIDE 75 MILLILITER(S): 9 INJECTION, SOLUTION INTRAMUSCULAR; INTRAVENOUS; SUBCUTANEOUS at 01:06

## 2024-11-06 RX ADMIN — CHLORHEXIDINE GLUCONATE 1 APPLICATION(S): 40 SOLUTION TOPICAL at 06:30

## 2024-11-06 NOTE — BRIEF OPERATIVE NOTE - OPERATION/FINDINGS
Endoscopic endonasal transphenoidal resection of pituitary tumor. No complications. Stealth navigation used throughout procedure. He in.

## 2024-11-06 NOTE — PROGRESS NOTE ADULT - ASSESSMENT
63M PMH DM, HTN, HLD, GERD, known history of pituitary tumor diagnosed 8 years ago in [facility], admitted with 2.7 x 2.7 x 3.9 cm pituitary mass with chiasmatic encroachment, now s/p endoscopic endonasal transphenoidal resection of pituitary tumor with Dr. Harmon and Dr. Baeza, POD#0. Patient seen lying comfortably in bed. Pain controlled with medication.    Plan:  - Q1 neuro checks, Q1 I&Os  - SBP goal 100-160  - CTH in AM   - MRI Brain w/wo pituitary focused tomorrow   - Ancef x 24hrs   - AM endocrine labs, needs endocrine consult in AM  - AM cortisol x 3 days  - Transphenoidal precautions: NO nose blowing, CPAP/BiPAP, nasal cannula, nasal swabs, NGT, staws or incentive spirometry; avoid straining  - Monitor for any signs of CSF leak  - DI watch: strict I&Os, miner in  - Sodium q6 x 24hrs, if ok then q8, then q12  - Diet: Dysphagia screen and then advance diet as tolerated  - Bowel regimen: senna, miralax  - Ocean nasal spray every hour while awake, ENT following, recs appreciated  - Continue home HTN/HLD meds  - Hold AC/AP  - DVT ppx: SCDs only  - Further medical management per NSICU  - Discussed with Dr. 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 Harmon and Dr. Baeza, POD#0. Patient seen lying comfortably in bed. Patient c/o frontal headache that is improving with pain medication. Denies vision changes, numbness/tingling, weakness, or n/v.    Plan:  - Q1 neuro checks, Q1 I&Os  - SBP goal 100-160  - CTH in AM   - MRI Brain w/wo pituitary focused tomorrow   - Ancef x 24hrs   - AM endocrine labs, needs endocrine consult in AM  - AM cortisol x 3 days  - Transphenoidal precautions: NO nose blowing, CPAP/BiPAP, nasal cannula, nasal swabs, NGT, straws or incentive spirometry; avoid straining  - Monitor for any signs of CSF leak  - DI watch: strict I&Os, miner in  - Sodium q6 x 24hrs, if ok then q8, then q12  - Diet: Dysphagia screen and then advance diet as tolerated  - Bowel regimen: senna, miralax  - Ocean nasal spray every hour while awake, ENT following, recs appreciated  - Continue home HTN/HLD meds  - Hold AC/AP  - DVT ppx: SCDs only  - Further medical management per NSICU  - Discussed with Dr. Harmon

## 2024-11-06 NOTE — CONSULT NOTE ADULT - SUBJECTIVE AND OBJECTIVE BOX
HPI:  HPI: 63M with PMH of DM, HTN, HLD, GERD, on ASA81 daily, known history of pituitary tumor diagnosed 8 years ago in [facility] with no follow up since then, presents with generalized weakness x 1 day. Patient reports difficulty getting out of his chair with unsteady gait since this morning. Patient currently c/o mild headache and burning with urination. Denies any recent falls/trauma, vision changes, dizziness, nausea/vomiting, numbness, or tingling. 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 Pt also noted to have TWI on EKG, cards consulted and further w/u pending. Pt reportedly claims to be compliant on home cardiac medications. Pt admitted to neurosurgery for likely surgical resection of pituitary tumor with Dr. Harmon.    PAST MEDICAL & SURGICAL HISTORY:  HTN (hypertension)  HLD (hyperlipidemia)  DM (diabetes mellitus)  GERD (gastroesophageal reflux disease)  H/O: pituitary tumor    No significant past surgical history      FAMILY HISTORY:  No pertinent family history in first degree relatives      Allergies  No Known Allergies  Intolerances    REVIEW OF SYSTEMS  As noted in HPI    HOME MEDICATIONS:  Home Medications:  atorvastatin 20 mg oral tablet: 1 tab(s) orally once a day (at bedtime) (28 Oct 2024 16:14)  losartan 50 mg oral tablet: 1 tab(s) orally once a day (28 Oct 2024 16:14)  metFORMIN 500 mg oral tablet: 1 tab(s) orally 2 times a day (28 Oct 2024 16:14)  metoprolol succinate 50 mg oral tablet, extended release: 1 tab(s) orally 2 times a day (28 Oct 2024 16:14)  NIFEdipine (Eqv-Adalat CC) 60 mg oral tablet, extended release: 1 tab(s) orally once a day (28 Oct 2024 16:14)      MEDICATIONS:  Antibiotics:    Neuro:  acetaminophen     Tablet .. 650 milliGRAM(s) Oral every 6 hours PRN  oxyCODONE    IR 5 milliGRAM(s) Oral every 6 hours PRN    Anticoagulation:    OTHER:  atorvastatin 20 milliGRAM(s) Oral at bedtime  dextrose 50% Injectable 25 Gram(s) IV Push once  dextrose 50% Injectable 25 Gram(s) IV Push once  dextrose 50% Injectable 12.5 Gram(s) IV Push once  dextrose Oral Gel 15 Gram(s) Oral once PRN  glucagon  Injectable 1 milliGRAM(s) IntraMuscular once  insulin lispro (ADMELOG) corrective regimen sliding scale   SubCutaneous three times a day before meals  insulin lispro (ADMELOG) corrective regimen sliding scale   SubCutaneous at bedtime  losartan 50 milliGRAM(s) Oral daily  metoprolol succinate ER 50 milliGRAM(s) Oral two times a day  NIFEdipine XL 60 milliGRAM(s) Oral daily  pantoprazole    Tablet 40 milliGRAM(s) Oral before breakfast  polyethylene glycol 3350 17 Gram(s) Oral daily  senna 2 Tablet(s) Oral at bedtime    IVF:  dextrose 5%. 1000 milliLiter(s) IV Continuous <Continuous>  dextrose 5%. 1000 milliLiter(s) IV Continuous <Continuous>  multivitamin 1 Tablet(s) Oral daily  sodium chloride 0.9% lock flush 3 milliLiter(s) IV Push every 8 hours    PHYSICAL EXAM:  GENERAL: NAD  HEAD: No nasal leaking  AMARIS COMA SCORE: E-4 V-5 M-6 = 15  MENTAL STATUS: AAO x3; Awake; Opens eyes spontaneously; Appropriately conversant without aphasia; following simple commands  CRANIAL NERVES: B/l pupils 3mm sluggish. EOMI without nystagmus. Facial sensation intact V1-3 distribution b/l. Face symmetric w/ normal eye closure and smile, tongue midline. Hearing grossly intact. Speech clear.   MOTOR: strength 5/5 b/l upper and lower extremities  SENSATION: grossly intact to light touch all extremities  SKIN: Warm, dry    Vital Signs Last 24 Hrs  T(C): 36.7 (28 Oct 2024 15:19), Max: 37.1 (27 Oct 2024 16:37)  T(F): 98.1 (28 Oct 2024 15:19), Max: 98.7 (27 Oct 2024 16:37)  HR: 68 (28 Oct 2024 15:19) (61 - 98)  BP: 138/88 (28 Oct 2024 15:19) (130/68 - 162/88)  BP(mean): --  RR: 19 (28 Oct 2024 15:19) (16 - 19)  SpO2: 92% (28 Oct 2024 15:19) (92% - 96%)    Parameters below as of 28 Oct 2024 07:55  Patient On (Oxygen Delivery Method): room air   (28 Oct 2024 16:19)    Vital Signs Last 24 Hrs  T(C): 37 (07 Nov 2024 08:32), Max: 37.7 (07 Nov 2024 04:00)  T(F): 98.6 (07 Nov 2024 08:32), Max: 99.9 (07 Nov 2024 04:00)  HR: 72 (07 Nov 2024 14:00) (71 - 92)  BP: 105/62 (07 Nov 2024 14:00) (103/60 - 153/87)  BP(mean): 73 (07 Nov 2024 14:00) (73 - 106)  RR: 15 (07 Nov 2024 14:00) (6 - 24)  SpO2: 96% (07 Nov 2024 14:00) (94% - 100%)    Parameters below as of 07 Nov 2024 12:00  Patient On (Oxygen Delivery Method): room air          LABS:                        10.0   11.09 )-----------( 213      ( 07 Nov 2024 04:24 )             29.8     11-07    136  |  101  |  16.2  ----------------------------<  234[H]  4.0   |  22.0  |  0.90    Ca    8.6      07 Nov 2024 10:20  Phos  2.6     11-07  Mg     1.8     11-07      PT/INR - ( 06 Nov 2024 05:31 )   PT: 11.4 sec;   INR: 1.01 ratio         PTT - ( 06 Nov 2024 05:31 )  PTT:28.6 sec  Urinalysis Basic - ( 07 Nov 2024 10:20 )    Color: x / Appearance: x / SG: x / pH: x  Gluc: 234 mg/dL / Ketone: x  / Bili: x / Urobili: x   Blood: x / Protein: x / Nitrite: x   Leuk Esterase: x / RBC: x / WBC x   Sq Epi: x / Non Sq Epi: x / Bacteria: x        11-06 @ 07:01  -  11-07 @ 07:00  --------------------------------------------------------  IN: 1070 mL / OUT: 1230 mL / NET: -160 mL    11-07 @ 07:01  -  11-07 @ 14:59  --------------------------------------------------------  IN: 240 mL / OUT: 145 mL / NET: 95 mL        RADIOLOGY & ADDITIONAL TESTS:     CT Head No Cont (11.07.24 @ 05:56)     Impression: Recent transsphenoidal surgery with removal of a large sellar   suprasellar mass since 10/27/2024.       CT Sinuses No Cont (11.05.24 @ 11:00)     IMPRESSION:  Large sellar/suprasellar mass, better visualized on MRI 10/28/2024. There   is extension into the right greater than left sphenoid sinus and possibly   into the right sphenoethmoidal recess. Possible bony dehiscence of the   right superior petrous carotid canal, a consideration for surgical   planning.    Inflammatory changes throughout the remainder of the paranasal sinuses as   detailed above.    Mild S-shaped nasal septal deviation. Sinonasal anatomic variations,   detailed above.        Assessment and Recommendation:   · Assessment	  63M PMH DM, HTN, HLD, GERD, known history of pituitary tumor diagnosed 8 years ago in [facility], admitted with 2.7 x 2.7 x 3.9 cm pituitary mass with chiasmatic encroachment, now s/p endoscopic endonasal transphenoidal resection of pituitary tumor with Dr. Harmon and Dr. Baeza, POD#0. Patient seen lying comfortably in bed. Patient c/o frontal headache that is improving with pain medication. Denies vision changes, numbness/tingling, weakness, or n/v.    Plan:  - Q1 neuro checks, Q1 I&Os  - SBP goal 100-160  - CTH in AM   - MRI Brain w/wo pituitary focused tomorrow   - Ancef x 24hrs   - AM endocrine labs, needs endocrine consult in AM  - AM cortisol x 3 days  - Transphenoidal precautions: NO nose blowing, CPAP/BiPAP, nasal cannula, nasal swabs, NGT, straws or incentive spirometry; avoid straining  - Monitor for any signs of CSF leak  - DI watch: strict I&Os, kristofer in  - Sodium q6 x 24hrs, if ok then q8, then q12  - Diet: Dysphagia screen and then advance diet as tolerated  - Bowel regimen: senna, miralax  - Ocean nasal spray every hour while awake, ENT following, recs appreciated  - Continue home HTN/HLD meds  - Hold AC/AP  - DVT ppx: SCDs only  - Further medical management per NSICU  - Discussed with Dr. Harmon           HPI:  HPI: 63M with PMH of DM, HTN, HLD, GERD, on ASA81 daily, known history of pituitary tumor diagnosed 8 years ago in [facility] with no follow up since then, presents with generalized weakness x 1 day. Patient reports difficulty getting out of his chair with unsteady gait since this morning. Patient currently c/o mild headache and burning with urination. Denies any recent falls/trauma, vision changes, dizziness, nausea/vomiting, numbness, or tingling. 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 Pt also noted to have TWI on EKG, cards consulted and further w/u pending. Pt reportedly claims to be compliant on home cardiac medications. Pt admitted to neurosurgery for likely surgical resection of pituitary tumor with Dr. Harmon.    PAST MEDICAL & SURGICAL HISTORY:  HTN (hypertension)  HLD (hyperlipidemia)  DM (diabetes mellitus)  GERD (gastroesophageal reflux disease)  H/O: pituitary tumor    No significant past surgical history      FAMILY HISTORY:  No pertinent family history in first degree relatives      Allergies  No Known Allergies  Intolerances    REVIEW OF SYSTEMS  As noted in HPI    HOME MEDICATIONS:  Home Medications:  atorvastatin 20 mg oral tablet: 1 tab(s) orally once a day (at bedtime) (28 Oct 2024 16:14)  losartan 50 mg oral tablet: 1 tab(s) orally once a day (28 Oct 2024 16:14)  metFORMIN 500 mg oral tablet: 1 tab(s) orally 2 times a day (28 Oct 2024 16:14)  metoprolol succinate 50 mg oral tablet, extended release: 1 tab(s) orally 2 times a day (28 Oct 2024 16:14)  NIFEdipine (Eqv-Adalat CC) 60 mg oral tablet, extended release: 1 tab(s) orally once a day (28 Oct 2024 16:14)      MEDICATIONS:  Antibiotics:    Neuro:  acetaminophen     Tablet .. 650 milliGRAM(s) Oral every 6 hours PRN  oxyCODONE    IR 5 milliGRAM(s) Oral every 6 hours PRN    Anticoagulation:    OTHER:  atorvastatin 20 milliGRAM(s) Oral at bedtime  dextrose 50% Injectable 25 Gram(s) IV Push once  dextrose 50% Injectable 25 Gram(s) IV Push once  dextrose 50% Injectable 12.5 Gram(s) IV Push once  dextrose Oral Gel 15 Gram(s) Oral once PRN  glucagon  Injectable 1 milliGRAM(s) IntraMuscular once  insulin lispro (ADMELOG) corrective regimen sliding scale   SubCutaneous three times a day before meals  insulin lispro (ADMELOG) corrective regimen sliding scale   SubCutaneous at bedtime  losartan 50 milliGRAM(s) Oral daily  metoprolol succinate ER 50 milliGRAM(s) Oral two times a day  NIFEdipine XL 60 milliGRAM(s) Oral daily  pantoprazole    Tablet 40 milliGRAM(s) Oral before breakfast  polyethylene glycol 3350 17 Gram(s) Oral daily  senna 2 Tablet(s) Oral at bedtime    IVF:  dextrose 5%. 1000 milliLiter(s) IV Continuous <Continuous>  dextrose 5%. 1000 milliLiter(s) IV Continuous <Continuous>  multivitamin 1 Tablet(s) Oral daily  sodium chloride 0.9% lock flush 3 milliLiter(s) IV Push every 8 hours    PHYSICAL EXAM:  GENERAL: NAD  HEAD: No nasal leaking  AMARIS COMA SCORE: E-4 V-5 M-6 = 15  MENTAL STATUS: AAO x3; Awake; Opens eyes spontaneously; Appropriately conversant without aphasia; following simple commands  CRANIAL NERVES: B/l pupils 3mm sluggish. EOMI without nystagmus.+ Bitemporal hemianopsia.  Facial sensation intact V1-3 distribution b/l. Face symmetric w/ normal eye closure and smile, tongue midline. Hearing grossly intact. Speech clear.   MOTOR: strength 5/5 b/l upper and lower extremities  SENSATION: grossly intact to light touch all extremities  SKIN: Warm, dry    Vital Signs Last 24 Hrs  T(C): 36.7 (28 Oct 2024 15:19), Max: 37.1 (27 Oct 2024 16:37)  T(F): 98.1 (28 Oct 2024 15:19), Max: 98.7 (27 Oct 2024 16:37)  HR: 68 (28 Oct 2024 15:19) (61 - 98)  BP: 138/88 (28 Oct 2024 15:19) (130/68 - 162/88)  BP(mean): --  RR: 19 (28 Oct 2024 15:19) (16 - 19)  SpO2: 92% (28 Oct 2024 15:19) (92% - 96%)    Parameters below as of 28 Oct 2024 07:55  Patient On (Oxygen Delivery Method): room air   (28 Oct 2024 16:19)    Vital Signs Last 24 Hrs  T(C): 37 (07 Nov 2024 08:32), Max: 37.7 (07 Nov 2024 04:00)  T(F): 98.6 (07 Nov 2024 08:32), Max: 99.9 (07 Nov 2024 04:00)  HR: 72 (07 Nov 2024 14:00) (71 - 92)  BP: 105/62 (07 Nov 2024 14:00) (103/60 - 153/87)  BP(mean): 73 (07 Nov 2024 14:00) (73 - 106)  RR: 15 (07 Nov 2024 14:00) (6 - 24)  SpO2: 96% (07 Nov 2024 14:00) (94% - 100%)    Parameters below as of 07 Nov 2024 12:00  Patient On (Oxygen Delivery Method): room air          LABS:                        10.0   11.09 )-----------( 213      ( 07 Nov 2024 04:24 )             29.8     11-07    136  |  101  |  16.2  ----------------------------<  234[H]  4.0   |  22.0  |  0.90    Ca    8.6      07 Nov 2024 10:20  Phos  2.6     11-07  Mg     1.8     11-07      PT/INR - ( 06 Nov 2024 05:31 )   PT: 11.4 sec;   INR: 1.01 ratio         PTT - ( 06 Nov 2024 05:31 )  PTT:28.6 sec  Urinalysis Basic - ( 07 Nov 2024 10:20 )    Color: x / Appearance: x / SG: x / pH: x  Gluc: 234 mg/dL / Ketone: x  / Bili: x / Urobili: x   Blood: x / Protein: x / Nitrite: x   Leuk Esterase: x / RBC: x / WBC x   Sq Epi: x / Non Sq Epi: x / Bacteria: x        11-06 @ 07:01  -  11-07 @ 07:00  --------------------------------------------------------  IN: 1070 mL / OUT: 1230 mL / NET: -160 mL    11-07 @ 07:01  -  11-07 @ 14:59  --------------------------------------------------------  IN: 240 mL / OUT: 145 mL / NET: 95 mL        RADIOLOGY & ADDITIONAL TESTS:     CT Head No Cont (11.07.24 @ 05:56)     Impression: Recent transsphenoidal surgery with removal of a large sellar   suprasellar mass since 10/27/2024.       CT Sinuses No Cont (11.05.24 @ 11:00)     IMPRESSION:  Large sellar/suprasellar mass, better visualized on MRI 10/28/2024. There   is extension into the right greater than left sphenoid sinus and possibly   into the right sphenoethmoidal recess. Possible bony dehiscence of the   right superior petrous carotid canal, a consideration for surgical   planning.    Inflammatory changes throughout the remainder of the paranasal sinuses as   detailed above.    Mild S-shaped nasal septal deviation. Sinonasal anatomic variations,   detailed above.        Assessment and Recommendation:   · Assessment	  63M PMH DM, HTN, HLD, GERD, known history of pituitary tumor diagnosed 8 years ago in [facility], admitted with 2.7 x 2.7 x 3.9 cm pituitary mass with chiasmatic encroachment, now s/p endoscopic endonasal transphenoidal resection of pituitary tumor with Dr. Harmon and Dr. Baeza, POD#0. Patient seen lying comfortably in bed. Patient c/o frontal headache that is improving with pain medication. Denies vision changes, numbness/tingling, weakness, or n/v.    Plan:  - Q1 neuro checks, Q1 I&Os  - SBP goal 100-160  - CTH in AM   - MRI Brain w/wo pituitary focused tomorrow   - Ancef x 24hrs   - AM endocrine labs, needs endocrine consult in AM  - AM cortisol x 3 days  - Transphenoidal precautions: NO nose blowing, CPAP/BiPAP, nasal cannula, nasal swabs, NGT, straws or incentive spirometry; avoid straining  - Monitor for any signs of CSF leak  - DI watch: strict I&Os, kristofer in  - Sodium q6 x 24hrs, if ok then q8, then q12  - Diet: Dysphagia screen and then advance diet as tolerated  - Bowel regimen: senna, miralax  - Ocean nasal spray every hour while awake, ENT following, recs appreciated  - Continue home HTN/HLD meds  - Hold AC/AP  - DVT ppx: SCDs only  - Further medical management per NSICU  - Discussed with Dr. Harmon

## 2024-11-06 NOTE — PROGRESS NOTE ADULT - NS PANP COMMENT GEN_ALL_CORE FT
Patient scheduled for OR today. The risks, benefits, alternatives, personnel, and complications were discussed with the patient and family. All questions answered. They requested that we proceed.

## 2024-11-06 NOTE — BRIEF OPERATIVE NOTE - NSICDXBRIEFPROCEDURE_GEN_ALL_CORE_FT
PROCEDURES:  Endoscopic transphenoidal or transnasal resection of pituitary tumor 06-Nov-2024 14:58:35  Minnie Palacios

## 2024-11-06 NOTE — PROGRESS NOTE ADULT - ASSESSMENT
63M with PMH of DM, HTN, HLD, GERD, on ASA81 daily, known history of pituitary tumor diagnosed 8 years ago in [facility] with no follow up since then, presents with generalized weakness x 1 day. Neurosurgery admitting for 2.7 x 2.7 x 3.9 cm pituitary mass with chiasmatic encroachment.

## 2024-11-06 NOTE — BRIEF OPERATIVE NOTE - NSICDXBRIEFPOSTOP_GEN_ALL_CORE_FT
POST-OP DIAGNOSIS:  S/P selective transsphenoidal pituitary adenomectomy 06-Nov-2024 14:59:20  Minnie Palacios

## 2024-11-06 NOTE — CONSULT NOTE ADULT - TIME BILLING
62yo M with pituitary mass, now s/p TSP resection 11/6.  PMH of DM, HTN, HLD, GERD.    Plan:  - neurochecks  - postop CTh in am  - MRI Brain w/wo planned for tomorrow   - AM endocrine labs, endocrine consult   - Transphenoidal precautions, monitor for CSF leak  - SBP goal 100-160, PRN meds  - maintain OSats>92%  - monitor e-lytes, strict i/os, keep He for now  - diet as tolerated, BM regimen, PPI ppx  - DVT ppx: SCDs only as fresh postop  - FS goal 120-180, ISS          Time spent included review of relevant history, clinical examination, review of data and images, discussion of treatment with the multidisciplinary team and any consultants involved in this patient’s care,

## 2024-11-06 NOTE — PROGRESS NOTE ADULT - ASSESSMENT
63M with PMH of DM, HTN, HLD, GERD, on ASA81 daily, known history of pituitary tumor diagnosed 8 years ago in [place] with no follow up since then, presents with generalized weakness x 1 day. Neurosurgery admitted for 2.7 x 2.7 x 3.9 cm pituitary mass with chiasmatic encroachment as noted on MRI.    Plan:  - Q4 neuro checks   - Medically optimized for OR today with Dr. Harmon/Dr. Baeza  - All imaging reviewed: CTH and MRI Brain shows 2.7 x 2.7 x 3.9 cm pituitary mass with chiasmatic encroachment  - Endocrine recs appreciated; endocrinology following and hormonal work-up  - Cleared by cardiology on 10/28/24  - Neuroptho consult obtained  - SBP goal <160  - Continue home HTN/HLD meds  - Continue to hold aspirin  - Bowel Regimen: Senna and Miralax  - DVT ppx: SCDs & Lovenox held for OR today  - Discussed with Dr. Harmon

## 2024-11-06 NOTE — PROGRESS NOTE ADULT - SUBJECTIVE AND OBJECTIVE BOX
[FreeTextEntry1] : 9 year F with cerumen impaction. Removed today. Audio was done and was normal.\par Discussed not using q-tips and recommend olive or mineral oil 3 times a week to keep ear canal lubricated. Discussed that the ear is a self cleaning structure and just allow it clean itself. If wax builds up can try debrox. Once it gets impacted recommend return to get it cleaned out.  \par \par Also with epistaxis.  Discussed with parents regarding options for nasal cautery but wishes to pursue conservative measures at this time.  Instruction sheet given regarding nasal hygiene, moisturization, and humidification.  Family given instructions on how to handle bleeding when it happens including pressure over the soft part of the nose for at least 5 straight minutes and if not stopped do again for ten minutes straight.  Use lots of hydration in the nose including nasal gel and vaseline at night. Consider humidification and allergy control. .  If not improved in 6-8 weeks plan to return and scope and consider cautery at that time.\par \par RTC PRN\par  ENT ISSUE/POD: Pituitary lesion     Interval HPI:  Pt NPO for OR today with Dr. Baeza and Dr. Harmon      PAST MEDICAL & SURGICAL HISTORY:  HTN (hypertension)      HLD (hyperlipidemia)      DM (diabetes mellitus)      GERD (gastroesophageal reflux disease)      H/O: pituitary tumor      No significant past surgical history        Allergies    No Known Allergies    Intolerances      MEDICATIONS  (STANDING):  atorvastatin 20 milliGRAM(s) Oral at bedtime  ceFAZolin  Injectable. 2000 milliGRAM(s) IV Push once  dextrose 5%. 1000 milliLiter(s) (100 mL/Hr) IV Continuous <Continuous>  dextrose 5%. 1000 milliLiter(s) (50 mL/Hr) IV Continuous <Continuous>  dextrose 50% Injectable 25 Gram(s) IV Push once  dextrose 50% Injectable 25 Gram(s) IV Push once  dextrose 50% Injectable 12.5 Gram(s) IV Push once  glucagon  Injectable 1 milliGRAM(s) IntraMuscular once  influenza   Vaccine 0.5 milliLiter(s) IntraMuscular once  insulin glargine Injectable (LANTUS) 4 Unit(s) SubCutaneous at bedtime  insulin lispro (ADMELOG) corrective regimen sliding scale   SubCutaneous at bedtime  insulin lispro (ADMELOG) corrective regimen sliding scale   SubCutaneous three times a day before meals  insulin lispro Injectable (ADMELOG) 8 Unit(s) SubCutaneous three times a day before meals  losartan 50 milliGRAM(s) Oral daily  metoprolol succinate ER 50 milliGRAM(s) Oral two times a day  multivitamin 1 Tablet(s) Oral daily  NIFEdipine XL 60 milliGRAM(s) Oral daily  pantoprazole    Tablet 40 milliGRAM(s) Oral before breakfast  polyethylene glycol 3350 17 Gram(s) Oral daily  povidone iodine 10% Nasal Swab 1 Application(s) Both Nostrils once  senna 2 Tablet(s) Oral at bedtime  sodium chloride 0.9% lock flush 3 milliLiter(s) IV Push every 8 hours  sodium chloride 0.9%. 1000 milliLiter(s) (75 mL/Hr) IV Continuous <Continuous>    MEDICATIONS  (PRN):  acetaminophen     Tablet .. 650 milliGRAM(s) Oral every 6 hours PRN Moderate Pain (4 - 6)  dextrose Oral Gel 15 Gram(s) Oral once PRN Blood Glucose LESS THAN 70 milliGRAM(s)/deciliter    ROS:   ENT: all negative except as noted in HPI   Pulm: denies SOB, cough, hemoptysis  Neuro: denies numbness/tingling, loss of sensation  Endo: denies heat/cold intolerance, excessive sweating      Vital Signs Last 24 Hrs  T(C): 37.2 (06 Nov 2024 08:05), Max: 37.9 (06 Nov 2024 00:18)  T(F): 98.9 (06 Nov 2024 08:05), Max: 100.2 (06 Nov 2024 00:18)  HR: 57 (06 Nov 2024 08:05) (57 - 72)  BP: 123/74 (06 Nov 2024 08:05) (111/70 - 136/78)  BP(mean): --  RR: 18 (06 Nov 2024 08:05) (18 - 19)  SpO2: 96% (06 Nov 2024 08:05) (95% - 98%)    Parameters below as of 06 Nov 2024 08:05  Patient On (Oxygen Delivery Method): room air                              10.1   7.25  )-----------( 195      ( 06 Nov 2024 05:31 )             30.7    11-06    141  |  104  |  14.7  ----------------------------<  145[H]  4.2   |  24.0  |  0.99    Ca    8.6      06 Nov 2024 05:31  Phos  3.7     11-06  Mg     1.8     11-06     PT/INR - ( 06 Nov 2024 05:31 )   PT: 11.4 sec;   INR: 1.01 ratio         PTT - ( 06 Nov 2024 05:31 )  PTT:28.6 sec    PHYSICAL EXAM:  Gen: NAD  Skin: No rashes, bruises, or lesions  Head: Normocephalic, Atraumatic  Face: no edema, erythema, or fluctuance. Parotid glands soft without mass  Eyes: no scleral injection  Nose: Nares bilaterally patent, no discharge  Mouth: No Stridor / Drooling / Trismus.  Mucosa moist, tongue/uvula midline, oropharynx clear  Neck: Flat, supple, no lymphadenopathy, trachea midline, no masses  Lymphatic: No lymphadenopathy  Resp: breathing easily, no stridor  Neuro: facial nerve intact, no facial droop

## 2024-11-06 NOTE — PROGRESS NOTE ADULT - SUBJECTIVE AND OBJECTIVE BOX
POST-OPERATIVE NOTE    Procedure: Endoscopic endonasal transphenoidal resection of pituitary tumor    Diagnosis/Indication: Pituitary tumor    Surgeon: Dr. Harmon/Dr. 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 Patient seen lying comfortably in bed. Pain controlled with medication.    VITALS:  T(C): 36.3 (11-06-24 @ 10:47), Max: 37.9 (11-06-24 @ 00:18)  HR: 72 (11-06-24 @ 10:47) (57 - 72)  BP: 139/85 (11-06-24 @ 10:47) (121/71 - 139/85)  RR: 16 (11-06-24 @ 10:47) (16 - 18)  SpO2: 97% (11-06-24 @ 10:47) (95% - 97%)  Wt(kg): --    PHYSICAL EXAM:  GENERAL: NAD  HEAD:  S/p L crani. Dressing clean, dry, intact. Dried blood noted, not fully saturated.  DRAINS: Subgaleal/epidural/subdural drain to bulb suction/thumbprint suction/gravity. Serosanguinous drainage noted.  NECK: C-collar in place. Dressing clean, dry, intact  WOUND: Dressing clean dry intact  AMARIS COMA SCORE: E- V- M- =       E: 4= opens eyes spontaneously 3= to voice 2= to noxious 1= no opening       V: 5= oriented 4= confused 3= inappropriate words 2= incomprehensible sounds 1= nonverbal 1T= intubated       M: 6= follows commands 5= localizes 4= withdraws 3= flexor posturing 2= extensor posturing 1= no movement  MENTAL STATUS: AAO x3; Awake/Comatose; Opens eyes spontaneously/to voice/to light touch/to noxious stimuli; Appropriately conversant without aphasia/Nonverbal; following simple commands/mimicking/not following commands  CRANIAL NERVES: Visual acuity normal for age, visual fields full to confrontation, PERRL. EOMI without nystagmus. Facial sensation intact V1-3 distribution b/l. Face symmetric w/ normal eye closure and smile, tongue midline. Hearing grossly intact. Speech clear. Head turning and shoulder shrug intact.   REFLEXES: PERRL. Corneals intact b/l. Gag intact. Cough intact. Oculocephalic reflex intact (Doll's eye). Negative Sr's b/l. Negative clonus b/l  MOTOR: strength 5/5 b/l upper and lower extremities  Uppers     Delt (C5/6)     Bicep (C5/6)     Wrist Extend (C6)     Tricep (C7)     HG (C8/T1)  R                     5/5                 5/5                         5/5                           5/5                   5/5  L                      5/5                 5/5                         5/5                           5/5                   5/5  Lowers      HF(L1/L2)     KE (L3)     DF (L4)     EHL (L5)     PF (S1)      R                     5/5              5/5           5/5           5/5            5/5  L                     5/5               5/5          5/5            5/5            5/5  SENSATION: grossly intact to light touch all extremities  COORDINATION: Gait intact; rapid alternating movements intact; heel to shin intact; no upper extremity dysmetria  CHEST/LUNG: Clear to auscultation bilaterally; no rales, rhonchi, wheezing, or rubs  HEART: +S1/+S2; Regular rate and rhythm; no murmurs, rubs, or gallops  ABDOMEN: Soft, nontender, nondistended; bowel sounds present all four quadrants  EXTREMITIES:  2+ peripheral pulses, no clubbing, cyanosis, or edema  SKIN: Warm, dry; no rashes or lesions    LABS:                        10.1   7.25  )-----------( 195      ( 06 Nov 2024 05:31 )             30.7     11-06    141  |  104  |  14.7  ----------------------------<  145[H]  4.2   |  24.0  |  0.99    Ca    8.6      06 Nov 2024 05:31  Phos  3.7     11-06  Mg     1.8     11-06    RADIOLOGY/OTHER:  < from: MR Sella alone w/wo IV Cont (10.28.24 @ 07:26) >  IMPRESSION:    1. 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    2. BRAIN:   Ischemic white matter disease greater than typical for age.   Diffuse brain volume loss typical for age. POST-OPERATIVE NOTE    Procedure: Endoscopic endonasal transphenoidal resection of pituitary tumor    Diagnosis/Indication: Pituitary tumor    Surgeon: Dr. Harmon/Dr. Baeza    INTERVAL HPI/ACUTE EVENTS:  63M PMH DM, HTN, HLD, GERD, known history of pituitary tumor diagnosed 8 years ago in [facility], admitted with 2.7 x 2.7 x 3.9 cm pituitary mass with chiasmatic encroachment, now s/p endoscopic endonasal transphenoidal resection of pituitary tumor POD#0. Patient seen lying comfortably in bed. Patient c/o frontal headache that is improving with pain medication. Denies vision changes, numbness/tingling, weakness, or n/v.    VITALS:  T(C): 36.3 (11-06-24 @ 10:47), Max: 37.9 (11-06-24 @ 00:18)  HR: 72 (11-06-24 @ 10:47) (57 - 72)  BP: 139/85 (11-06-24 @ 10:47) (121/71 - 139/85)  RR: 16 (11-06-24 @ 10:47) (16 - 18)  SpO2: 97% (11-06-24 @ 10:47) (95% - 97%)  Wt(kg): --    PHYSICAL EXAM:  GENERAL: NAD  HEAD: No nasal leaking  AMARIS COMA SCORE: E-4 V-5 M-6 = 15  MENTAL STATUS: AAO x3; Awake; Opens eyes spontaneously; Appropriately conversant without aphasia; following simple commands  CRANIAL NERVES: B/l pupils 3mm sluggish. EOMI without nystagmus. Facial sensation intact V1-3 distribution b/l. Face symmetric w/ normal eye closure and smile, tongue midline. Hearing grossly intact. Speech clear.   MOTOR: strength 5/5 b/l upper and lower extremities  SENSATION: grossly intact to light touch all extremities  SKIN: Warm, dry    LABS:                        10.1   7.25  )-----------( 195      ( 06 Nov 2024 05:31 )             30.7     11-06    141  |  104  |  14.7  ----------------------------<  145[H]  4.2   |  24.0  |  0.99    Ca    8.6      06 Nov 2024 05:31  Phos  3.7     11-06  Mg     1.8     11-06    RADIOLOGY/OTHER:  < from: MR Sella alone w/wo IV Cont (10.28.24 @ 07:26) >  IMPRESSION:    1. 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    2. BRAIN:   Ischemic white matter disease greater than typical for age.   Diffuse brain volume loss typical for age.

## 2024-11-06 NOTE — PROGRESS NOTE ADULT - SUBJECTIVE AND OBJECTIVE BOX
HPI:  63M with PMH of DM, HTN, HLD, GERD, on ASA81 daily, known history of pituitary tumor diagnosed 8 years ago in [place] with no follow up since then, presents with generalized weakness x 1 day. Patient reports difficulty getting out of his chair with unsteady gait since this morning. Patient currently c/o mild headache and burning with urination. Denies any recent falls/trauma, vision changes, dizziness, nausea/vomiting, numbness, or tingling. 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 Pt also noted to have TWI on EKG, cards consulted and further w/u pending. Pt reportedly claims to be compliant on home cardiac medications. Pt admitted to neurosurgery for likely surgical resection of pituitary tumor with Dr. Harmon.    INTERVAL HPI/OVERNIGHT EVENTS:  63M seen lying comfortably in bed this morning on rounds. NPO. Plan for OR today with Dr. Harmon and Dr. Baeza. Denies headache, weakness, numbness, n/v.    Vital Signs Last 24 Hrs  T(C): 37.2 (06 Nov 2024 08:05), Max: 37.9 (06 Nov 2024 00:18)  T(F): 98.9 (06 Nov 2024 08:05), Max: 100.2 (06 Nov 2024 00:18)  HR: 57 (06 Nov 2024 08:05) (57 - 72)  BP: 123/74 (06 Nov 2024 08:05) (111/70 - 136/78)  BP(mean): --  RR: 18 (06 Nov 2024 08:05) (18 - 19)  SpO2: 96% (06 Nov 2024 08:05) (95% - 98%)    Parameters below as of 06 Nov 2024 08:05  Patient On (Oxygen Delivery Method): room air    PHYSICAL EXAM:  GENERAL: NAD, well-groomed  HEAD:  Atraumatic, normocephalic  DRAINS:   WOUND: Dressing clean dry intact  AMARIS COMA SCORE: E- V- M- =       E: 4= opens eyes spontaneously 3= to voice 2= to noxious 1= no opening       V: 5= oriented 4= confused 3= inappropriate words 2= incomprehensible sounds 1= nonverbal 1T= intubated       M: 6= follows commands 5= localizes 4= withdraws 3= flexor posturing 2= extensor posturing 1= no movement  MENTAL STATUS: AAO x3; Awake/Comatose; Opens eyes spontaneously/to voice/to light touch/to noxious stimuli; Appropriately conversant without aphasia/Nonverbal; following simple commands/mimicking/not following commands  CRANIAL NERVES: Visual acuity normal for age, visual fields full to confrontation, PERRL. EOMI without nystagmus. Facial sensation intact V1-3 distribution b/l. Face symmetric w/ normal eye closure and smile, tongue midline. Hearing grossly intact. Speech clear. Head turning and shoulder shrug intact.   REFLEXES: PERRL. Corneals intact b/l. Gag intact. Cough intact. Oculocephalic reflex intact (Doll's eye). Negative Sr's b/l. Negative clonus b/l  MOTOR: strength 5/5 b/l upper and lower extremities  Uppers     Delt (C5/6)     Bicep (C5/6)     Wrist Extend (C6)     Tricep (C7)     HG (C8/T1)  R                     5/5                 5/5                         5/5                           5/5                   5/5  L                      5/5                 5/5                         5/5                           5/5                   5/5  Lowers      HF(L1/L2)     KE (L3)     DF (L4)     EHL (L5)     PF (S1)      R                     5/5              5/5           5/5           5/5            5/5  L                     5/5               5/5          5/5            5/5            5/5  SENSATION: grossly intact to light touch all extremities  COORDINATION: Gait intact; rapid alternating movements intact; heel to shin intact; no upper extremity dysmetria  CHEST/LUNG: Clear to auscultation bilaterally; no rales, rhonchi, wheezing, or rubs  HEART: +S1/+S2; Regular rate and rhythm; no murmurs, rubs, or gallops  ABDOMEN: Soft, nontender, nondistended; bowel sounds present all four quadrants  EXTREMITIES:  2+ peripheral pulses, no clubbing, cyanosis, or edema  SKIN: Warm, dry; no rashes or lesions    LABS:                        10.1   7.25  )-----------( 195      ( 06 Nov 2024 05:31 )             30.7     11-06    141  |  104  |  14.7  ----------------------------<  145[H]  4.2   |  24.0  |  0.99    Ca    8.6      06 Nov 2024 05:31  Phos  3.7     11-06  Mg     1.8     11-06      PT/INR - ( 06 Nov 2024 05:31 )   PT: 11.4 sec;   INR: 1.01 ratio         PTT - ( 06 Nov 2024 05:31 )  PTT:28.6 sec  Urinalysis Basic - ( 06 Nov 2024 05:31 )    Color: x / Appearance: x / SG: x / pH: x  Gluc: 145 mg/dL / Ketone: x  / Bili: x / Urobili: x   Blood: x / Protein: x / Nitrite: x   Leuk Esterase: x / RBC: x / WBC x   Sq Epi: x / Non Sq Epi: x / Bacteria: x        11-05 @ 07:01  -  11-06 @ 07:00  --------------------------------------------------------  IN: 240 mL / OUT: 850 mL / NET: -610 mL        RADIOLOGY & ADDITIONAL TESTS:   HPI:  63M with PMH of DM, HTN, HLD, GERD, on ASA81 daily, known history of pituitary tumor diagnosed 8 years ago in [facility] with no follow up since then, presents with generalized weakness x 1 day. Patient reports difficulty getting out of his chair with unsteady gait since this morning. Patient currently c/o mild headache and burning with urination. Denies any recent falls/trauma, vision changes, dizziness, nausea/vomiting, numbness, or tingling. 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 Pt also noted to have TWI on EKG, cards consulted and further w/u pending. Pt reportedly claims to be compliant on home cardiac medications. Pt admitted to neurosurgery for likely surgical resection of pituitary tumor with Dr. Harmon.    INTERVAL HPI/OVERNIGHT EVENTS:  63M seen lying comfortably in bed this morning on rounds. NPO. Plan for OR today with Dr. Harmon and Dr. Baeza. Denies headache, weakness, numbness, n/v.    Vital Signs Last 24 Hrs  T(C): 37.2 (06 Nov 2024 08:05), Max: 37.9 (06 Nov 2024 00:18)  T(F): 98.9 (06 Nov 2024 08:05), Max: 100.2 (06 Nov 2024 00:18)  HR: 57 (06 Nov 2024 08:05) (57 - 72)  BP: 123/74 (06 Nov 2024 08:05) (111/70 - 136/78)  BP(mean): --  RR: 18 (06 Nov 2024 08:05) (18 - 19)  SpO2: 96% (06 Nov 2024 08:05) (95% - 98%)    Parameters below as of 06 Nov 2024 08:05  Patient On (Oxygen Delivery Method): room air    PHYSICAL EXAM:  GENERAL: NAD  HEAD: Atraumatic, normocephalic  AMARIS COMA SCORE: E-4 V-5 M-6 = 15  MENTAL STATUS: AAO x3; Awake; Opens eyes spontaneously; Appropriately conversant without aphasia; following simple commands  CRANIAL NERVES: PERRL. EOMI without nystagmus. Facial sensation intact V1-3 distribution b/l. Face symmetric w/ normal eye closure and smile, tongue midline. Hearing grossly intact. Speech clear.  MOTOR: strength 5/5 b/l upper and lower extremities  SENSATION: grossly intact to light touch all extremities  CHEST/LUNG: Nonlabored breathing  SKIN: Warm, dry    LABS:                        10.1   7.25  )-----------( 195      ( 06 Nov 2024 05:31 )             30.7     11-06    141  |  104  |  14.7  ----------------------------<  145[H]  4.2   |  24.0  |  0.99    Ca    8.6      06 Nov 2024 05:31  Phos  3.7     11-06  Mg     1.8     11-06    PT/INR - ( 06 Nov 2024 05:31 )   PT: 11.4 sec;   INR: 1.01 ratio      PTT - ( 06 Nov 2024 05:31 )  PTT:28.6 sec  Urinalysis Basic - ( 06 Nov 2024 05:31 )    Color: x / Appearance: x / SG: x / pH: x  Gluc: 145 mg/dL / Ketone: x  / Bili: x / Urobili: x   Blood: x / Protein: x / Nitrite: x   Leuk Esterase: x / RBC: x / WBC x   Sq Epi: x / Non Sq Epi: x / Bacteria: x    11-05 @ 07:01  -  11-06 @ 07:00  --------------------------------------------------------  IN: 240 mL / OUT: 850 mL / NET: -610 mL    RADIOLOGY & ADDITIONAL TESTS:  < from: MR Sella alone w/wo IV Cont (10.28.24 @ 07:26) >  IMPRESSION:    1. 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    2. BRAIN:   Ischemic white matter disease greater than typical for age.   Diffuse brain volume loss typical for age.

## 2024-11-07 LAB
ACTH SER-ACNC: 44.9 PG/ML — SIGNIFICANT CHANGE UP (ref 7.2–63.3)
ANION GAP SERPL CALC-SCNC: 13 MMOL/L — SIGNIFICANT CHANGE UP (ref 5–17)
ANION GAP SERPL CALC-SCNC: 13 MMOL/L — SIGNIFICANT CHANGE UP (ref 5–17)
ANION GAP SERPL CALC-SCNC: 15 MMOL/L — SIGNIFICANT CHANGE UP (ref 5–17)
BUN SERPL-MCNC: 14.1 MG/DL — SIGNIFICANT CHANGE UP (ref 8–20)
BUN SERPL-MCNC: 16.2 MG/DL — SIGNIFICANT CHANGE UP (ref 8–20)
BUN SERPL-MCNC: 19.7 MG/DL — SIGNIFICANT CHANGE UP (ref 8–20)
CALCIUM SERPL-MCNC: 8.5 MG/DL — SIGNIFICANT CHANGE UP (ref 8.4–10.5)
CALCIUM SERPL-MCNC: 8.6 MG/DL — SIGNIFICANT CHANGE UP (ref 8.4–10.5)
CALCIUM SERPL-MCNC: 8.7 MG/DL — SIGNIFICANT CHANGE UP (ref 8.4–10.5)
CHLORIDE SERPL-SCNC: 101 MMOL/L — SIGNIFICANT CHANGE UP (ref 96–108)
CHLORIDE SERPL-SCNC: 101 MMOL/L — SIGNIFICANT CHANGE UP (ref 96–108)
CHLORIDE SERPL-SCNC: 102 MMOL/L — SIGNIFICANT CHANGE UP (ref 96–108)
CO2 SERPL-SCNC: 20 MMOL/L — LOW (ref 22–29)
CO2 SERPL-SCNC: 21 MMOL/L — LOW (ref 22–29)
CO2 SERPL-SCNC: 22 MMOL/L — SIGNIFICANT CHANGE UP (ref 22–29)
CORTIS AM PEAK SERPL-MCNC: 5 UG/DL — LOW (ref 6–18.4)
CREAT SERPL-MCNC: 0.83 MG/DL — SIGNIFICANT CHANGE UP (ref 0.5–1.3)
CREAT SERPL-MCNC: 0.9 MG/DL — SIGNIFICANT CHANGE UP (ref 0.5–1.3)
CREAT SERPL-MCNC: 0.97 MG/DL — SIGNIFICANT CHANGE UP (ref 0.5–1.3)
EGFR: 88 ML/MIN/1.73M2 — SIGNIFICANT CHANGE UP
EGFR: 96 ML/MIN/1.73M2 — SIGNIFICANT CHANGE UP
EGFR: 98 ML/MIN/1.73M2 — SIGNIFICANT CHANGE UP
GH SERPL-MCNC: 0.66 NG/ML — SIGNIFICANT CHANGE UP (ref 0.03–2.47)
GLUCOSE BLDC GLUCOMTR-MCNC: 210 MG/DL — HIGH (ref 70–99)
GLUCOSE BLDC GLUCOMTR-MCNC: 229 MG/DL — HIGH (ref 70–99)
GLUCOSE BLDC GLUCOMTR-MCNC: 241 MG/DL — HIGH (ref 70–99)
GLUCOSE BLDC GLUCOMTR-MCNC: 241 MG/DL — HIGH (ref 70–99)
GLUCOSE SERPL-MCNC: 220 MG/DL — HIGH (ref 70–99)
GLUCOSE SERPL-MCNC: 233 MG/DL — HIGH (ref 70–99)
GLUCOSE SERPL-MCNC: 234 MG/DL — HIGH (ref 70–99)
HCT VFR BLD CALC: 29.8 % — LOW (ref 39–50)
HGB BLD-MCNC: 10 G/DL — LOW (ref 13–17)
INSULIN-LIKE GROWTH FACTOR 1 INTERPRETATION: SIGNIFICANT CHANGE UP
INSULIN-LIKE GROWTH FACTOR 1: 50 NG/ML — SIGNIFICANT CHANGE UP (ref 43–225)
MAGNESIUM SERPL-MCNC: 1.8 MG/DL — SIGNIFICANT CHANGE UP (ref 1.6–2.6)
MCHC RBC-ENTMCNC: 28.2 PG — SIGNIFICANT CHANGE UP (ref 27–34)
MCHC RBC-ENTMCNC: 33.6 G/DL — SIGNIFICANT CHANGE UP (ref 32–36)
MCV RBC AUTO: 83.9 FL — SIGNIFICANT CHANGE UP (ref 80–100)
PHOSPHATE SERPL-MCNC: 2.6 MG/DL — SIGNIFICANT CHANGE UP (ref 2.4–4.7)
PLATELET # BLD AUTO: 213 K/UL — SIGNIFICANT CHANGE UP (ref 150–400)
POTASSIUM SERPL-MCNC: 3.9 MMOL/L — SIGNIFICANT CHANGE UP (ref 3.5–5.3)
POTASSIUM SERPL-MCNC: 4 MMOL/L — SIGNIFICANT CHANGE UP (ref 3.5–5.3)
POTASSIUM SERPL-MCNC: 4.1 MMOL/L — SIGNIFICANT CHANGE UP (ref 3.5–5.3)
POTASSIUM SERPL-SCNC: 3.9 MMOL/L — SIGNIFICANT CHANGE UP (ref 3.5–5.3)
POTASSIUM SERPL-SCNC: 4 MMOL/L — SIGNIFICANT CHANGE UP (ref 3.5–5.3)
POTASSIUM SERPL-SCNC: 4.1 MMOL/L — SIGNIFICANT CHANGE UP (ref 3.5–5.3)
PROLACTIN SERPL-MCNC: 3.2 NG/ML — LOW (ref 4.1–18.4)
RBC # BLD: 3.55 M/UL — LOW (ref 4.2–5.8)
RBC # FLD: 11.6 % — SIGNIFICANT CHANGE UP (ref 10.3–14.5)
SODIUM SERPL-SCNC: 135 MMOL/L — SIGNIFICANT CHANGE UP (ref 135–145)
SODIUM SERPL-SCNC: 136 MMOL/L — SIGNIFICANT CHANGE UP (ref 135–145)
SODIUM SERPL-SCNC: 137 MMOL/L — SIGNIFICANT CHANGE UP (ref 135–145)
TSH SERPL-MCNC: 0.37 UIU/ML — SIGNIFICANT CHANGE UP (ref 0.27–4.2)
WBC # BLD: 11.09 K/UL — HIGH (ref 3.8–10.5)
WBC # FLD AUTO: 11.09 K/UL — HIGH (ref 3.8–10.5)

## 2024-11-07 PROCEDURE — 99223 1ST HOSP IP/OBS HIGH 75: CPT | Mod: GC

## 2024-11-07 PROCEDURE — 70553 MRI BRAIN STEM W/O & W/DYE: CPT | Mod: 26,76

## 2024-11-07 PROCEDURE — 70450 CT HEAD/BRAIN W/O DYE: CPT | Mod: 26

## 2024-11-07 PROCEDURE — 99232 SBSQ HOSP IP/OBS MODERATE 35: CPT

## 2024-11-07 RX ORDER — DIBASIC SODIUM PHOSPHATE, MONOBASIC POTASSIUM PHOSPHATE AND MONOBASIC SODIUM PHOSPHATE 852; 155; 130 MG/1; MG/1; MG/1
2 TABLET ORAL ONCE
Refills: 0 | Status: COMPLETED | OUTPATIENT
Start: 2024-11-07 | End: 2024-11-07

## 2024-11-07 RX ORDER — INSULIN GLARGINE,HUM.REC.ANLOG 100/ML
12 VIAL (ML) SUBCUTANEOUS AT BEDTIME
Refills: 0 | Status: DISCONTINUED | OUTPATIENT
Start: 2024-11-07 | End: 2024-11-10

## 2024-11-07 RX ORDER — INSULIN LISPRO 100/ML
10 VIAL (ML) SUBCUTANEOUS
Refills: 0 | Status: DISCONTINUED | OUTPATIENT
Start: 2024-11-07 | End: 2024-11-10

## 2024-11-07 RX ORDER — ENOXAPARIN SODIUM 40MG/0.4ML
40 SYRINGE (ML) SUBCUTANEOUS
Refills: 0 | Status: DISCONTINUED | OUTPATIENT
Start: 2024-11-07 | End: 2024-11-10

## 2024-11-07 RX ORDER — MAGNESIUM SULFATE IN 0.9% NACL 2 G/50 ML
2 INTRAVENOUS SOLUTION, PIGGYBACK (ML) INTRAVENOUS ONCE
Refills: 0 | Status: COMPLETED | OUTPATIENT
Start: 2024-11-07 | End: 2024-11-07

## 2024-11-07 RX ORDER — CHLORHEXIDINE GLUCONATE 40 MG/ML
1 SOLUTION TOPICAL
Refills: 0 | Status: DISCONTINUED | OUTPATIENT
Start: 2024-11-07 | End: 2024-11-10

## 2024-11-07 RX ORDER — CALCIUM CARBONATE 215(500)MG
1 TABLET,CHEWABLE ORAL EVERY 4 HOURS
Refills: 0 | Status: DISCONTINUED | OUTPATIENT
Start: 2024-11-07 | End: 2024-11-10

## 2024-11-07 RX ADMIN — OXYCODONE HYDROCHLORIDE 5 MILLIGRAM(S): 30 TABLET ORAL at 21:16

## 2024-11-07 RX ADMIN — Medication 650 MILLIGRAM(S): at 08:16

## 2024-11-07 RX ADMIN — Medication 50 MILLIGRAM(S): at 17:27

## 2024-11-07 RX ADMIN — PANTOPRAZOLE SODIUM 40 MILLIGRAM(S): 40 TABLET, DELAYED RELEASE ORAL at 08:19

## 2024-11-07 RX ADMIN — Medication 10 UNIT(S): at 12:04

## 2024-11-07 RX ADMIN — Medication 40 MILLIGRAM(S): at 21:17

## 2024-11-07 RX ADMIN — Medication 1 SPRAY(S): at 17:27

## 2024-11-07 RX ADMIN — Medication 4: at 08:20

## 2024-11-07 RX ADMIN — Medication 12 UNIT(S): at 21:20

## 2024-11-07 RX ADMIN — Medication 60 MILLIGRAM(S): at 06:21

## 2024-11-07 RX ADMIN — Medication 50 MILLIGRAM(S): at 06:21

## 2024-11-07 RX ADMIN — Medication 650 MILLIGRAM(S): at 00:00

## 2024-11-07 RX ADMIN — Medication 4: at 21:21

## 2024-11-07 RX ADMIN — CHLORHEXIDINE GLUCONATE 1 APPLICATION(S): 40 SOLUTION TOPICAL at 08:20

## 2024-11-07 RX ADMIN — Medication 2000 MILLIGRAM(S): at 04:19

## 2024-11-07 RX ADMIN — Medication 2 TABLET(S): at 21:16

## 2024-11-07 RX ADMIN — Medication 8 UNIT(S): at 08:17

## 2024-11-07 RX ADMIN — DIBASIC SODIUM PHOSPHATE, MONOBASIC POTASSIUM PHOSPHATE AND MONOBASIC SODIUM PHOSPHATE 2 PACKET(S): 852; 155; 130 TABLET ORAL at 06:20

## 2024-11-07 RX ADMIN — OXYCODONE HYDROCHLORIDE 5 MILLIGRAM(S): 30 TABLET ORAL at 22:00

## 2024-11-07 RX ADMIN — OXYCODONE HYDROCHLORIDE 5 MILLIGRAM(S): 30 TABLET ORAL at 16:04

## 2024-11-07 RX ADMIN — Medication 20 MILLIGRAM(S): at 21:16

## 2024-11-07 RX ADMIN — Medication 1 SPRAY(S): at 21:00

## 2024-11-07 RX ADMIN — POLYETHYLENE GLYCOL 3350 17 GRAM(S): 17 POWDER, FOR SOLUTION ORAL at 12:05

## 2024-11-07 RX ADMIN — Medication 4: at 12:04

## 2024-11-07 RX ADMIN — Medication 4: at 16:31

## 2024-11-07 RX ADMIN — Medication 10 UNIT(S): at 16:30

## 2024-11-07 RX ADMIN — Medication 25 GRAM(S): at 06:21

## 2024-11-07 NOTE — OCCUPATIONAL THERAPY INITIAL EVALUATION ADULT - ADDITIONAL COMMENTS
right handed  Pt has a shower stall  no DME
Pt has a tub with curtains and no grab bars. Pt was independent PTA without an assist device. Pt owns no DME.Pt is R handed and does not drive

## 2024-11-07 NOTE — DIETITIAN INITIAL EVALUATION ADULT - PERTINENT LABORATORY DATA
11-07    135  |  102  |  14.1  ----------------------------<  233[H]  4.1   |  20.0[L]  |  0.83    Ca    8.7      07 Nov 2024 04:24  Phos  2.6     11-07  Mg     1.8     11-07    POCT Blood Glucose.: 229 mg/dL (11-07-24 @ 08:12)  A1C with Estimated Average Glucose Result: 7.2 % (10-30-24 @ 05:00)  A1C with Estimated Average Glucose Result: 7.1 % (10-29-24 @ 05:12)

## 2024-11-07 NOTE — PROGRESS NOTE ADULT - ASSESSMENT
63M PMH DM, HTN, HLD, GERD, known history of pituitary tumor diagnosed 8 years ago in [facility], admitted with 2.7 x 2.7 x 3.9 cm pituitary mass with chiasmatic encroachment, now s/p endoscopic endonasal transphenoidal resection of pituitary tumor with Dr. Harmon and Dr. Baeza, POD#0. Patient seen lying comfortably in bed. Patient c/o frontal headache that is improving with pain medication. Denies vision changes, numbness/tingling, weakness, or n/v.    Plan:  - Q1 neuro checks, Q1 I&Os  - SBP goal 100-160  - CTH in AM   - MRI Brain w/wo pituitary focused pending  - Ancef x 24hrs   - AM endocrine labs, endocrine following  - AM cortisol x 3 days  - Transphenoidal precautions: NO nose blowing, CPAP/BiPAP, nasal cannula, nasal swabs, NGT, straws or incentive spirometry; avoid straining  - Monitor for any signs of CSF leak  - DI watch: strict I&Os, miner in  - Sodium q6 x 24hrs, if ok then q8, then q12  - Diet: DASH  - Bowel regimen: senna, miralax  - Myra nasal spray, ENT following, recs appreciated  - Continue home HTN/HLD meds  - Hold AC/AP  - DVT ppx: SCDs only. Will discuss timing of DVT ppx on am rounds   - Further medical management per NSICU    Will discuss further on am rounds with Dr. Harmon

## 2024-11-07 NOTE — CONSULT NOTE ADULT - ASSESSMENT
63M PMH DM, HTN, HLD, GERD, known history of pituitary tumor diagnosed 8 years ago in [facility], admitted with 2.7 x 2.7 x 3.9 cm pituitary mass with chiasmatic encroachment, now s/p endoscopic endonasal transphenoidal resection of pituitary tumor with Dr. Harmon and Dr. Baeza, POD#0. Patient seen lying comfortably in bed. Patient c/o frontal headache that is improving with pain medication. Denies vision changes, numbness/tingling, weakness, or n/v.    Plan:  - Q1 neuro checks, Q1 I&Os  - SBP goal 100-160  - CTH in AM   - MRI Brain w/wo pituitary focused tomorrow   - Ancef x 24hrs   - AM endocrine labs, needs endocrine consult in AM  - AM cortisol x 3 days  - Transphenoidal precautions: NO nose blowing, CPAP/BiPAP, nasal cannula, nasal swabs, NGT, straws or incentive spirometry; avoid straining  - Monitor for any signs of CSF leak  - DI watch: strict I&Os, miner in  - Sodium q6 x 24hrs, if ok then q8, then q12  - Diet: Dysphagia screen and then advance diet as tolerated  - Bowel regimen: senna, miralax  - Ocean nasal spray every hour while awake, ENT following, recs appreciated  - Continue home HTN/HLD meds  - Hold AC/AP  - DVT ppx: SCDs only  - Further medical management per NSICU  - Discussed with Dr. Harmon

## 2024-11-07 NOTE — PHYSICAL THERAPY INITIAL EVALUATION ADULT - PERTINENT HX OF CURRENT PROBLEM, REHAB EVAL
63M with PMH of DM, HTN, HLD, GERD, on ASA81 daily, known history of pituitary tumor diagnosed 8 years ago in [facility] with no follow up since then, presents with generalized weakness x 1 day. Patient reports difficulty getting out of his chair with unsteady gait since this morning. Patient currently c/o mild headache and burning with urination. Denies any recent falls/trauma, vision changes, dizziness, nausea/vomiting, numbness, or tingling. 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 Pt also noted to have TWI on EKG, cards consulted and further w/u pending. Pt reportedly claims to be compliant on home cardiac medications. Pt is now POD#1 s/p Endoscopic transphenoidal resection of pituitary tumor. Pt evaluated in NSICU.
63M with PMH of DM, HTN, HLD, GERD, on ASA81 daily, known history of pituitary tumor diagnosed 8 years ago in [facility] with no follow up since then, presents with generalized weakness x 1 day. Patient reports difficulty getting out of his chair with unsteady gait since this morning. Patient currently c/o mild headache and burning with urination. Denies any recent falls/trauma, vision changes, dizziness, nausea/vomiting, numbness, or tingling. 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 Pt also noted to have TWI on EKG, cards consulted and further w/u pending. Pt reportedly claims to be compliant on home cardiac medications. Pt admitted to neurosurgery for likely surgical resection of pituitary tumor

## 2024-11-07 NOTE — CONSULT NOTE ADULT - REASON FOR ADMISSION
Pituitary Tumor
Generalized weakness x 1 day
Pituitary Tumor
Pituitary Tumor

## 2024-11-07 NOTE — CONSULT NOTE ADULT - CONSULT REQUESTED BY NAME
Dr. Christos Fernández
Private MD
ED Provider
TWILA
Dr Harmon
Neurosurgery
Neurosurgery
Sunday Harmon MD

## 2024-11-07 NOTE — OCCUPATIONAL THERAPY INITIAL EVALUATION ADULT - DIAGNOSIS, OT EVAL
63 year old Male presents with generalized weakness x 1 day. 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 Pt also noted to have TWI on EKG, cardiology consulted and further w/u pending. Pt is now POD#1 s/p Endoscopic transphenoidal resection of pituitary tumor. Pt evaluated in NSICU

## 2024-11-07 NOTE — CONSULT NOTE ADULT - ATTENDING COMMENTS
Patient seen and examined. Case discussed with Dr. Horton and agree with recommendations outlined above. I have personally reviewed the imaging and labs listed above.    Rehab/Impaired mobility and function -  Patient continues to require hospitalization for the above diagnoses and ongoing active management of comorbid complications that are substantially posing a threat to bodily function, functional ability and quality of life.    RECOMMEND - OOB daily      When medically optimized, based on the patient's diagnosis, current functional status and potential for progress, expect patient to achieve functional goals for DC HOME.      Will continue to follow. Rehab recommendations are dependent on how functional progress changes as well as how patient continues to participate and tolerate therapeutic interventions, which may change. Recommend ongoing mobilization by staff to maintain cardiopulmonary function and prevention of secondary complications related to debility. Discussed the specific rehabilitation management and recommendations above with rehab clinical care team/rehab liaison.      Total Time Spent on Encounter (reviewing clinical notes, labs, radiology and medications, reviewing patient history, discussing with resident, pre-rounding, physical exam, assessment and discussing rehabilitation options - with consideration of prior level of function, expected level of recovery and return to community living, rounding with team) - 75 minutes

## 2024-11-07 NOTE — DIETITIAN INITIAL EVALUATION ADULT - NS FNS DIET ORDER
Diet, Regular:   Consistent Carbohydrate {No Snacks} (CSTCHO)  DASH/TLC {Sodium & Cholesterol Restricted} (DASH) (11-06-24 @ 21:25)

## 2024-11-07 NOTE — PHYSICAL THERAPY INITIAL EVALUATION ADULT - DIAGNOSIS, PT EVAL
Pt presents with decreased mobility, strength, and endurance.
functional debility 2*2 impaired balance and ? cognition/left sided inattention (? if language barrier)

## 2024-11-07 NOTE — OCCUPATIONAL THERAPY INITIAL EVALUATION ADULT - VISUAL ASSESSMENT: VISUAL NEGLECT
none
mild L ? noted with pt requiring increased cues for L sided task commands and pt with decreased obstacle negotiation on the L during functional mobility

## 2024-11-07 NOTE — PHYSICAL THERAPY INITIAL EVALUATION ADULT - LEVEL OF INDEPENDENCE: GAIT, REHAB EVAL
pt initially ambulated ~ 20 feet without assist device, slow saranya, wide TERRANCE and small step length. Performance improves with RW however, contact guard still needed 2*2 decreased safety awareness and forward flexed posture.
contact guard

## 2024-11-07 NOTE — CONSULT NOTE ADULT - CONSULT REASON
s/p TSP for pituitary tumor resection
Medical co management
pituitary tumor
2.7 x 2.7 x 3.9 cm pituitary mass
Transphenoidal approach of pituitary tumor resection
Pituatary tumor resection
abnormal EKG
Pituitary tumor

## 2024-11-07 NOTE — PHYSICAL THERAPY INITIAL EVALUATION ADULT - SENSORY TESTS
(+) eye tracking bilaterally in all directions (+) nystagmus noted in right eye with right lateral/superior gaze and in left eye with left lateral/superior gaze; (+) acuity in all fields except left impaired and multiple verbal cues needed to maintain central gaze to test peripheral vision.

## 2024-11-07 NOTE — OCCUPATIONAL THERAPY INITIAL EVALUATION ADULT - RANGE OF MOTION EXAMINATION, UPPER EXTREMITY
pt required additional verbal cues for LUE commands/bilateral UE Active ROM was WFL  (within functional limits)
bilateral UE Active ROM was WFL  (within functional limits)

## 2024-11-07 NOTE — OCCUPATIONAL THERAPY INITIAL EVALUATION ADULT - LIVES WITH, PROFILE
Pt lives with sisters and niece in a 1 level home, 0 [place]
Pt lives in an apartment with his 2 sisters and niece. Pt niece does not work and can assist. 1 [place], no handrails, no steps inside./other relative

## 2024-11-07 NOTE — OCCUPATIONAL THERAPY INITIAL EVALUATION ADULT - VISUAL ASSESSMENT: TRACKING
WFL. (+) nystagmus noted in R eye with R lateral/superior gaze and in L eye with L lateral/superior gaze

## 2024-11-07 NOTE — DIETITIAN INITIAL EVALUATION ADULT - ORAL INTAKE PTA/DIET HISTORY
Pt asleep this morning, RD to follow up at more appropriate time as feasible/appropriate to review diet education. Chart and events reviewed. Pt upgraded to ICU s/p pituitary tumor resection. Tolerating oral diet with good-fair po intake. Weight trends appear stable, current weight 220 lbs. Last documented BM 11/3, bowel regimen in place. H/o DM and elevated BG levels noted, consistent cho dietary restrictions and insulin regimen in place. RD remains available.

## 2024-11-07 NOTE — OCCUPATIONAL THERAPY INITIAL EVALUATION ADULT - PLANNED THERAPY INTERVENTIONS, OT EVAL
ADL retraining/balance training/transfer training
ADL retraining/IADL retraining/balance training/bed mobility training/cognitive, visual perceptual/fine motor coordination training/motor coordination training/neuromuscular re-education/ROM/strengthening/transfer training

## 2024-11-07 NOTE — PROGRESS NOTE ADULT - ASSESSMENT
63M with PMH of DM, HTN, HLD, GERD, on ASA81 daily, known history of pituitary tumor diagnosed 8 years ago in [facility] with no follow up since then, presents with generalized weakness x 1 day and found to have large pituitary mass extensively   elevates and distorts the optic chiasm and forebrain, invades the sphenoid sinus to the right of midline and likely invades the right cavernous sinus. S/p transphenoidal pituitary resection on 11/6/24.     1. Pituitary macroadenoma with optic chiasm compression, s/p resection  - Preop hormonal workup normal --> non functioning pituitary macroadenoma  - S/p resection on 11/6/24  - Postop cortisol/ACTH pending  - Monitor for DI, strict I&Os and BMP  - Serum NA stable, 135    2. T2DM, a1c 7.1%- glucoses elevated  - Lantus increased to 12 units qhs  - Admelog increased to 10 units TID  - Correction scale    3. HLD  - Continue statin     63M with PMH of DM, HTN, HLD, GERD, on ASA81 daily, known history of pituitary tumor diagnosed 8 years ago in [facility] with no follow up since then, presents with generalized weakness x 1 day and found to have large pituitary mass extensively   elevates and distorts the optic chiasm and forebrain, invades the sphenoid sinus to the right of midline and likely invades the right cavernous sinus. S/p transphenoidal pituitary resection on 11/6/24.     1. Pituitary macroadenoma with optic chiasm compression, s/p resection  - Preop hormonal workup normal --> non functioning pituitary macroadenoma  - S/p resection on 11/6/24  - Postop cortisol/ACTH pending  - Monitor for DI, strict I&Os and BMP  - Serum NA stable, 135  - TSH normal but unreliable in pituitary surgery, check free thyroxine    2. T2DM, a1c 7.1%- glucoses elevated  - Lantus increased to 12 units qhs  - Admelog increased to 10 units TID  - Correction scale    3. HLD  - Continue statin

## 2024-11-07 NOTE — PROGRESS NOTE ADULT - ASSESSMENT
63M with PMH of DM, HTN, HLD, GERD, on ASA81 daily, known history of pituitary tumor diagnosed 8 years ago in [facility] with no follow up since then, presented to [facility] ED 10/24/24 with generalized weakness x 1 day. He was admitted for 2.7 x 2.7 x 3.9 cm pituitary mass with chiasmatic encroachment and is now s/p endoscopic transphenoidal or transnasal resection of pituitary tumor 11/6/2024.

## 2024-11-07 NOTE — PROGRESS NOTE ADULT - SUBJECTIVE AND OBJECTIVE BOX
HPI:   63M with PMH of DM, HTN, HLD, GERD, on ASA81 daily, known history of pituitary tumor diagnosed 8 years ago in [facility] with no follow up since then, presents with generalized weakness x 1 day. Patient reports difficulty getting out of his chair with unsteady gait since this morning. Patient currently c/o mild headache and burning with urination. Denies any recent falls/trauma, vision changes, dizziness, nausea/vomiting, numbness, or tingling. 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 Pt also noted to have TWI on EKG, cards consulted and further w/u pending. Pt reportedly claims to be compliant on home cardiac medications. Pt admitted to neurosurgery for likely surgical resection of pituitary tumor with Dr. Harmon.    INTERVAL HPI/OVERNIGHT EVENTS:  Creole  [id number] (Korin)  63M known history of pituitary tumor diagnosed 8 years ago in [facility], admitted with 2.7 x 2.7 x 3.9 cm pituitary mass with chiasmatic encroachment, now s/p endoscopic endonasal transphenoidal resection of pituitary tumor POD#1. Patient seen lying comfortably in bed. Patient c/o frontal headache that is improving with pain medication. Denies vision changes, numbness/tingling, weakness, or n/v. No metallic/salty taste or nasal drainage. Urine output 50-125cc per hour overnight and Na stable.     Vital Signs Last 24 Hrs  T(C): 37.2 (07 Nov 2024 00:00), Max: 37.3 (06 Nov 2024 04:04)  T(F): 98.9 (07 Nov 2024 00:00), Max: 99.1 (06 Nov 2024 04:04)  HR: 80 (07 Nov 2024 00:00) (57 - 86)  BP: 149/82 (07 Nov 2024 00:00) (109/82 - 153/87)  BP(mean): 98 (07 Nov 2024 00:00) (82 - 106)  RR: 14 (07 Nov 2024 00:00) (6 - 19)  SpO2: 95% (07 Nov 2024 00:00) (94% - 100%)    Parameters below as of 07 Nov 2024 00:00  Patient On (Oxygen Delivery Method): room air      PHYSICAL EXAM:  General: No Acute Distress     Neurological: Awake, alert & oriented to person, place and time, Following Commands, PERRL, EOMI, Visual fields intact, Face Symmetric, Speech Fluent, Moving all extremities     Muscle Strength: RUE: 5/5 LUE: 5/5 RLE: 5/5 LLE: 5/5  No Drift     Sensation Intact to Light Touch     Cerebellar: There is no dysmetria on finger to nose testing.    Gait : deferred    Pulmonary: non labored breathing, no use of accessory muscles    Cardiovascular:  Regular Rate and Rhythm     Gastrointestinal: Soft, Nontender, Nondistended       LABS:                        10.1   7.25  )-----------( 195      ( 06 Nov 2024 05:31 )             30.7     11-06    135  |  101  |  14.9  ----------------------------<  269[H]  4.1   |  21.0[L]  |  0.89    Ca    8.4      06 Nov 2024 22:00  Phos  3.7     11-06  Mg     1.8     11-06      PT/INR - ( 06 Nov 2024 05:31 )   PT: 11.4 sec;   INR: 1.01 ratio         PTT - ( 06 Nov 2024 05:31 )  PTT:28.6 sec  Urinalysis Basic - ( 06 Nov 2024 22:00 )    Color: x / Appearance: x / SG: x / pH: x  Gluc: 269 mg/dL / Ketone: x  / Bili: x / Urobili: x   Blood: x / Protein: x / Nitrite: x   Leuk Esterase: x / RBC: x / WBC x   Sq Epi: x / Non Sq Epi: x / Bacteria: x        11-05 @ 07:01 - 11-06 @ 07:00  --------------------------------------------------------  IN: 240 mL / OUT: 850 mL / NET: -610 mL    11-06 @ 07:01 - 11-07 @ 02:30  --------------------------------------------------------  IN: 940 mL / OUT: 865 mL / NET: 75 mL        RADIOLOGY & ADDITIONAL TESTS:  < from: MR Sella alone w/wo IV Cont (10.28.24 @ 07:26) >  IMPRESSION:    1. 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    2. BRAIN:   Ischemic white matter disease greater than typical for age.   Diffuse brain volume loss typical for age.

## 2024-11-07 NOTE — PROGRESS NOTE ADULT - SUBJECTIVE AND OBJECTIVE BOX
ENT ISSUE/POD: POD1 s/p Endoscopic transphenoidal resection of pituitary tumor 11/6    HPI:   Patient was seen and examined at bedside found resting comfortably in bed. Denies visual changes and headaches.       PAST MEDICAL & SURGICAL HISTORY:  HTN (hypertension)  HLD (hyperlipidemia)  DM (diabetes mellitus)  GERD (gastroesophageal reflux disease)  H/O: pituitary tumor  No significant past surgical history        Allergies  No Known Allergies        MEDICATIONS  (STANDING):  atorvastatin 20 milliGRAM(s) Oral at bedtime  chlorhexidine 2% Cloths 1 Application(s) Topical <User Schedule>  influenza   Vaccine 0.5 milliLiter(s) IntraMuscular once  insulin glargine Injectable (LANTUS) 8 Unit(s) SubCutaneous at bedtime  insulin lispro (ADMELOG) corrective regimen sliding scale   SubCutaneous Before meals and at bedtime  insulin lispro Injectable (ADMELOG) 8 Unit(s) SubCutaneous three times a day before meals  metoprolol succinate ER 50 milliGRAM(s) Oral two times a day  NIFEdipine XL 60 milliGRAM(s) Oral daily  pantoprazole    Tablet 40 milliGRAM(s) Oral before breakfast  polyethylene glycol 3350 17 Gram(s) Oral daily  senna 2 Tablet(s) Oral at bedtime  sodium chloride 0.9%. 1000 milliLiter(s) (70 mL/Hr) IV Continuous <Continuous>    MEDICATIONS  (PRN):  acetaminophen     Tablet .. 650 milliGRAM(s) Oral every 6 hours PRN Mild Pain (1 - 3)  hydrALAZINE Injectable 10 milliGRAM(s) IV Push every 2 hours PRN SBP > 160mm Hg  HYDROmorphone  Injectable 0.5 milliGRAM(s) IV Push every 6 hours PRN Severe Pain (7 - 10)  labetalol Injectable 10 milliGRAM(s) IV Push every 2 hours PRN Systolic blood pressure >160  ondansetron Injectable 4 milliGRAM(s) IV Push every 6 hours PRN Nausea and/or Vomiting  oxyCODONE    IR 5 milliGRAM(s) Oral every 4 hours PRN Moderate Pain (4 - 6)  oxyCODONE    IR 10 milliGRAM(s) Oral every 4 hours PRN Severe Pain (7 - 10)  sodium chloride 0.65% Nasal 1 Spray(s) Both Nostrils every 4 hours PRN Nasal Congestion      Social History: see consult note    Family history: see consult note    ROS:   ENT: all negative except as noted in HPI   Pulm: denies SOB, cough, hemoptysis  Neuro: denies numbness/tingling, loss of sensation  Endo: denies heat/cold intolerance, excessive sweating      Vital Signs Last 24 Hrs  T(C): 37.7 (07 Nov 2024 04:00), Max: 37.7 (07 Nov 2024 04:00)  T(F): 99.9 (07 Nov 2024 04:00), Max: 99.9 (07 Nov 2024 04:00)  HR: 78 (07 Nov 2024 07:00) (72 - 89)  BP: 143/78 (07 Nov 2024 07:00) (109/82 - 153/87)  BP(mean): 95 (07 Nov 2024 07:00) (82 - 106)  RR: 15 (07 Nov 2024 07:00) (6 - 19)  SpO2: 95% (07 Nov 2024 07:00) (94% - 100%)    Parameters below as of 07 Nov 2024 04:00  Patient On (Oxygen Delivery Method): room air                              10.0   11.09 )-----------( 213      ( 07 Nov 2024 04:24 )             29.8    11-07    135  |  102  |  14.1  ----------------------------<  233[H]  4.1   |  20.0[L]  |  0.83    Ca    8.7      07 Nov 2024 04:24  Phos  2.6     11-07  Mg     1.8     11-07     PT/INR - ( 06 Nov 2024 05:31 )   PT: 11.4 sec;   INR: 1.01 ratio         PTT - ( 06 Nov 2024 05:31 )  PTT:28.6 sec    PHYSICAL EXAM:  Gen: NAD  Skin: No rashes, bruises, or lesions  Head: Normocephalic, Atraumatic  Face: no edema, erythema, or fluctuance. Parotid glands soft without mass  Eyes: no scleral injection  Ears: external ears without deformity, drainage, discoloration   Nose: Nares bilaterally patent, no discharge  Mouth: No Stridor / Drooling / Trismus.  Mucosa moist, tongue/uvula midline, oropharynx clear  Neck: Flat, supple, no lymphadenopathy, trachea midline, no masses  Lymphatic: No lymphadenopathy  Resp: breathing easily, no stridor  Neuro: facial nerve intact, no facial droop

## 2024-11-07 NOTE — DIETITIAN INITIAL EVALUATION ADULT - PERTINENT MEDS FT
MEDICATIONS  (STANDING):  insulin glargine Injectable (LANTUS) 8 Unit(s) SubCutaneous at bedtime  insulin lispro (ADMELOG) corrective regimen sliding scale   SubCutaneous Before meals and at bedtime  insulin lispro Injectable (ADMELOG) 8 Unit(s) SubCutaneous three times a day before meals  pantoprazole    Tablet 40 milliGRAM(s) Oral before breakfast  polyethylene glycol 3350 17 Gram(s) Oral daily  senna 2 Tablet(s) Oral at bedtime    MEDICATIONS  (PRN):  hydrALAZINE Injectable 10 milliGRAM(s) IV Push every 2 hours PRN SBP > 160mm Hg  HYDROmorphone  Injectable 0.5 milliGRAM(s) IV Push every 6 hours PRN Severe Pain (7 - 10)  ondansetron Injectable 4 milliGRAM(s) IV Push every 6 hours PRN Nausea and/or Vomiting  oxyCODONE    IR 10 milliGRAM(s) Oral every 4 hours PRN Severe Pain (7 - 10)

## 2024-11-07 NOTE — PROGRESS NOTE ADULT - SUBJECTIVE AND OBJECTIVE BOX
HPI: 63M with PMH of DM, HTN, HLD, GERD, on ASA81 daily, known history of pituitary tumor diagnosed 8 years ago in [place] with no follow up since then, presents with generalized weakness x 1 day. Patient reports difficulty getting out of his chair with unsteady gait since this morning. Patient currently c/o mild headache and burning with urination. Denies any recent falls/trauma, vision changes, dizziness, nausea/vomiting, numbness, or tingling. 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 Pt also noted to have TWI on EKG, cards consulted and further w/u pending. Pt reportedly claims to be compliant on home cardiac medications. Pt admitted to neurosurgery for likely surgical resection of pituitary tumor with Dr. Harmon.    Interval events:   63M POD#1  s/p Pituitary tumor resection. patient post operatively admitted to NeuroICU. 11/7 sodium 135 and appropriate urine output. Patient appropriate for downgrade to NSG.       PAST MEDICAL & SURGICAL HISTORY:  HTN (hypertension)  HLD (hyperlipidemia)  DM (diabetes mellitus)  GERD (gastroesophageal reflux disease)  H/O: pituitary tumor    No significant past surgical history      FAMILY HISTORY:  No pertinent family history in first degree relatives      Allergies  No Known Allergies  Intolerances    REVIEW OF SYSTEMS  As noted in HPI    HOME MEDICATIONS:  Home Medications:  atorvastatin 20 mg oral tablet: 1 tab(s) orally once a day (at bedtime) (28 Oct 2024 16:14)  losartan 50 mg oral tablet: 1 tab(s) orally once a day (28 Oct 2024 16:14)  metFORMIN 500 mg oral tablet: 1 tab(s) orally 2 times a day (28 Oct 2024 16:14)  metoprolol succinate 50 mg oral tablet, extended release: 1 tab(s) orally 2 times a day (28 Oct 2024 16:14)  NIFEdipine (Eqv-Adalat CC) 60 mg oral tablet, extended release: 1 tab(s) orally once a day (28 Oct 2024 16:14)      MEDICATIONS:  Antibiotics:    Neuro:  acetaminophen     Tablet .. 650 milliGRAM(s) Oral every 6 hours PRN  oxyCODONE    IR 5 milliGRAM(s) Oral every 6 hours PRN    Anticoagulation:    OTHER:  atorvastatin 20 milliGRAM(s) Oral at bedtime  dextrose 50% Injectable 25 Gram(s) IV Push once  dextrose 50% Injectable 25 Gram(s) IV Push once  dextrose 50% Injectable 12.5 Gram(s) IV Push once  dextrose Oral Gel 15 Gram(s) Oral once PRN  glucagon  Injectable 1 milliGRAM(s) IntraMuscular once  insulin lispro (ADMELOG) corrective regimen sliding scale   SubCutaneous three times a day before meals  insulin lispro (ADMELOG) corrective regimen sliding scale   SubCutaneous at bedtime  losartan 50 milliGRAM(s) Oral daily  metoprolol succinate ER 50 milliGRAM(s) Oral two times a day  NIFEdipine XL 60 milliGRAM(s) Oral daily  pantoprazole    Tablet 40 milliGRAM(s) Oral before breakfast  polyethylene glycol 3350 17 Gram(s) Oral daily  senna 2 Tablet(s) Oral at bedtime    IVF:  dextrose 5%. 1000 milliLiter(s) IV Continuous <Continuous>  dextrose 5%. 1000 milliLiter(s) IV Continuous <Continuous>  multivitamin 1 Tablet(s) Oral daily  sodium chloride 0.9% lock flush 3 milliLiter(s) IV Push every 8 hours      Vital Signs Last 24 Hrs  T(C): 36.7 (28 Oct 2024 15:19), Max: 37.1 (27 Oct 2024 16:37)  T(F): 98.1 (28 Oct 2024 15:19), Max: 98.7 (27 Oct 2024 16:37)  HR: 68 (28 Oct 2024 15:19) (61 - 98)  BP: 138/88 (28 Oct 2024 15:19) (130/68 - 162/88)  BP(mean): --  RR: 19 (28 Oct 2024 15:19) (16 - 19)  SpO2: 92% (28 Oct 2024 15:19) (92% - 96%)    Parameters below as of 28 Oct 2024 07:55  Patient On (Oxygen Delivery Method): room air   (28 Oct 2024 16:19)        Vital Signs Last 24 Hrs  T(C): 37 (07 Nov 2024 08:32), Max: 37.7 (07 Nov 2024 04:00)  T(F): 98.6 (07 Nov 2024 08:32), Max: 99.9 (07 Nov 2024 04:00)  HR: 72 (07 Nov 2024 14:00) (71 - 92)  BP: 105/62 (07 Nov 2024 14:00) (103/60 - 153/87)  BP(mean): 73 (07 Nov 2024 14:00) (73 - 106)  RR: 15 (07 Nov 2024 14:00) (6 - 24)  SpO2: 96% (07 Nov 2024 14:00) (94% - 100%)    Parameters below as of 07 Nov 2024 12:00  Patient On (Oxygen Delivery Method): room air    PHYSICAL EXAM:  General: No Acute Distress   Neurological: Awake, alert & oriented to person, place and time, Following Commands, PERRL, EOMI,+ Bilateral temporal hemianopsia.  Face Symmetric, Speech Fluent, Moving all extremities   Muscle Strength: RUE: 5/5 LUE: 5/5 RLE: 5/5 LLE: 5/5  No Drift   Sensation Intact to Light Touch   Cerebellar: There is no dysmetria on finger to nose testing.  Gait : deferred    Pulmonary: non labored breathing, no use of accessory muscles  LABS:                        10.0   11.09 )-----------( 213      ( 07 Nov 2024 04:24 )             29.8     11-07    136  |  101  |  16.2  ----------------------------<  234[H]  4.0   |  22.0  |  0.90    Ca    8.6      07 Nov 2024 10:20  Phos  2.6     11-07  Mg     1.8     11-07      PT/INR - ( 06 Nov 2024 05:31 )   PT: 11.4 sec;   INR: 1.01 ratio         PTT - ( 06 Nov 2024 05:31 )  PTT:28.6 sec  Urinalysis Basic - ( 07 Nov 2024 10:20 )    Color: x / Appearance: x / SG: x / pH: x  Gluc: 234 mg/dL / Ketone: x  / Bili: x / Urobili: x   Blood: x / Protein: x / Nitrite: x   Leuk Esterase: x / RBC: x / WBC x   Sq Epi: x / Non Sq Epi: x / Bacteria: x        11-06 @ 07:01  -  11-07 @ 07:00  --------------------------------------------------------  IN: 1070 mL / OUT: 1230 mL / NET: -160 mL    11-07 @ 07:01  -  11-07 @ 15:08  --------------------------------------------------------  IN: 240 mL / OUT: 145 mL / NET: 95 mL        RADIOLOGY & ADDITIONAL TESTS:  < from: CT Head No Cont (11.07.24 @ 05:56) >  Impression: Recent transsphenoidal surgery with removal of a large sellar   suprasellar mass since 10/27/2024.       CT Sinuses No Cont (11.05.24 @ 11:00)   IMPRESSION:  Large sellar/suprasellar mass, better visualized on MRI 10/28/2024. There   is extension into the right greater than left sphenoid sinus and possibly   into the right sphenoethmoidal recess. Possible bony dehiscence of the   right superior petrous carotid canal, a consideration for surgical   planning.    Inflammatory changes throughout the remainder of the paranasal sinuses as   detailed above.    Mild S-shaped nasal septal deviation. Sinonasal anatomic variations,   detailed above.     ***NSICU TRANSFER NOTE TO NEUROSURGERY ***      HPI: 63M with PMH of DM, HTN, HLD, GERD, on ASA81 daily, known history of pituitary tumor diagnosed 8 years ago in [facility] with no follow up since then, presents with generalized weakness x 1 day. Patient reports difficulty getting out of his chair with unsteady gait since this morning. Patient currently c/o mild headache and burning with urination. Denies any recent falls/trauma, vision changes, dizziness, nausea/vomiting, numbness, or tingling. 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 Pt also noted to have TWI on EKG, cards consulted and further w/u pending. Pt reportedly claims to be compliant on home cardiac medications. Pt admitted to neurosurgery for likely surgical resection of pituitary tumor with Dr. Harmon.    Interval events:   63M POD#1  s/p Pituitary tumor resection. patient post operatively admitted to NeuroICU. 11/7 sodium 135 and appropriate urine output. Patient appropriate for downgrade to NSG.       PAST MEDICAL & SURGICAL HISTORY:  HTN (hypertension)  HLD (hyperlipidemia)  DM (diabetes mellitus)  GERD (gastroesophageal reflux disease)  H/O: pituitary tumor    No significant past surgical history      FAMILY HISTORY:  No pertinent family history in first degree relatives      Allergies  No Known Allergies  Intolerances    REVIEW OF SYSTEMS  As noted in HPI    HOME MEDICATIONS:  Home Medications:  atorvastatin 20 mg oral tablet: 1 tab(s) orally once a day (at bedtime) (28 Oct 2024 16:14)  losartan 50 mg oral tablet: 1 tab(s) orally once a day (28 Oct 2024 16:14)  metFORMIN 500 mg oral tablet: 1 tab(s) orally 2 times a day (28 Oct 2024 16:14)  metoprolol succinate 50 mg oral tablet, extended release: 1 tab(s) orally 2 times a day (28 Oct 2024 16:14)  NIFEdipine (Eqv-Adalat CC) 60 mg oral tablet, extended release: 1 tab(s) orally once a day (28 Oct 2024 16:14)      MEDICATIONS:  Antibiotics:    Neuro:  acetaminophen     Tablet .. 650 milliGRAM(s) Oral every 6 hours PRN  oxyCODONE    IR 5 milliGRAM(s) Oral every 6 hours PRN    Anticoagulation:    OTHER:  atorvastatin 20 milliGRAM(s) Oral at bedtime  dextrose 50% Injectable 25 Gram(s) IV Push once  dextrose 50% Injectable 25 Gram(s) IV Push once  dextrose 50% Injectable 12.5 Gram(s) IV Push once  dextrose Oral Gel 15 Gram(s) Oral once PRN  glucagon  Injectable 1 milliGRAM(s) IntraMuscular once  insulin lispro (ADMELOG) corrective regimen sliding scale   SubCutaneous three times a day before meals  insulin lispro (ADMELOG) corrective regimen sliding scale   SubCutaneous at bedtime  losartan 50 milliGRAM(s) Oral daily  metoprolol succinate ER 50 milliGRAM(s) Oral two times a day  NIFEdipine XL 60 milliGRAM(s) Oral daily  pantoprazole    Tablet 40 milliGRAM(s) Oral before breakfast  polyethylene glycol 3350 17 Gram(s) Oral daily  senna 2 Tablet(s) Oral at bedtime    IVF:  dextrose 5%. 1000 milliLiter(s) IV Continuous <Continuous>  dextrose 5%. 1000 milliLiter(s) IV Continuous <Continuous>  multivitamin 1 Tablet(s) Oral daily  sodium chloride 0.9% lock flush 3 milliLiter(s) IV Push every 8 hours      Vital Signs Last 24 Hrs  T(C): 36.7 (28 Oct 2024 15:19), Max: 37.1 (27 Oct 2024 16:37)  T(F): 98.1 (28 Oct 2024 15:19), Max: 98.7 (27 Oct 2024 16:37)  HR: 68 (28 Oct 2024 15:19) (61 - 98)  BP: 138/88 (28 Oct 2024 15:19) (130/68 - 162/88)  BP(mean): --  RR: 19 (28 Oct 2024 15:19) (16 - 19)  SpO2: 92% (28 Oct 2024 15:19) (92% - 96%)    Parameters below as of 28 Oct 2024 07:55  Patient On (Oxygen Delivery Method): room air   (28 Oct 2024 16:19)        Vital Signs Last 24 Hrs  T(C): 37 (07 Nov 2024 08:32), Max: 37.7 (07 Nov 2024 04:00)  T(F): 98.6 (07 Nov 2024 08:32), Max: 99.9 (07 Nov 2024 04:00)  HR: 72 (07 Nov 2024 14:00) (71 - 92)  BP: 105/62 (07 Nov 2024 14:00) (103/60 - 153/87)  BP(mean): 73 (07 Nov 2024 14:00) (73 - 106)  RR: 15 (07 Nov 2024 14:00) (6 - 24)  SpO2: 96% (07 Nov 2024 14:00) (94% - 100%)    Parameters below as of 07 Nov 2024 12:00  Patient On (Oxygen Delivery Method): room air    PHYSICAL EXAM:  General: No Acute Distress   Neurological: Awake, alert & oriented to person, place and time, Following Commands, PERRL, EOMI,+ Bilateral temporal hemianopsia.  Face Symmetric, Speech Fluent, Moving all extremities   Muscle Strength: RUE: 5/5 LUE: 5/5 RLE: 5/5 LLE: 5/5  No Drift   Sensation Intact to Light Touch   Cerebellar: There is no dysmetria on finger to nose testing.  Gait : deferred    Pulmonary: non labored breathing, no use of accessory muscles  LABS:                        10.0   11.09 )-----------( 213      ( 07 Nov 2024 04:24 )             29.8     11-07    136  |  101  |  16.2  ----------------------------<  234[H]  4.0   |  22.0  |  0.90    Ca    8.6      07 Nov 2024 10:20  Phos  2.6     11-07  Mg     1.8     11-07      PT/INR - ( 06 Nov 2024 05:31 )   PT: 11.4 sec;   INR: 1.01 ratio         PTT - ( 06 Nov 2024 05:31 )  PTT:28.6 sec  Urinalysis Basic - ( 07 Nov 2024 10:20 )    Color: x / Appearance: x / SG: x / pH: x  Gluc: 234 mg/dL / Ketone: x  / Bili: x / Urobili: x   Blood: x / Protein: x / Nitrite: x   Leuk Esterase: x / RBC: x / WBC x   Sq Epi: x / Non Sq Epi: x / Bacteria: x        11-06 @ 07:01  -  11-07 @ 07:00  --------------------------------------------------------  IN: 1070 mL / OUT: 1230 mL / NET: -160 mL    11-07 @ 07:01  -  11-07 @ 15:08  --------------------------------------------------------  IN: 240 mL / OUT: 145 mL / NET: 95 mL        RADIOLOGY & ADDITIONAL TESTS:  < from: CT Head No Cont (11.07.24 @ 05:56) >  Impression: Recent transsphenoidal surgery with removal of a large sellar   suprasellar mass since 10/27/2024.       CT Sinuses No Cont (11.05.24 @ 11:00)   IMPRESSION:  Large sellar/suprasellar mass, better visualized on MRI 10/28/2024. There   is extension into the right greater than left sphenoid sinus and possibly   into the right sphenoethmoidal recess. Possible bony dehiscence of the   right superior petrous carotid canal, a consideration for surgical   planning.    Inflammatory changes throughout the remainder of the paranasal sinuses as   detailed above.    Mild S-shaped nasal septal deviation. Sinonasal anatomic variations,   detailed above.     ***NSICU TRANSFER NOTE TO NEUROSURGERY ***      HPI: 63M with PMH of DM, HTN, HLD, GERD, on ASA81 daily, known history of pituitary tumor diagnosed 8 years ago in [facility] with no follow up since then, presents with generalized weakness x 1 day. Patient reports difficulty getting out of his chair with unsteady gait since this morning. Patient currently c/o mild headache and burning with urination. Denies any recent falls/trauma, vision changes, dizziness, nausea/vomiting, numbness, or tingling. 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 Pt also noted to have TWI on EKG, cards consulted and further w/u pending. Pt reportedly claims to be compliant on home cardiac medications. Pt admitted to neurosurgery for likely surgical resection of pituitary tumor with Dr. Harmon.    Interval events:   63M POD#1  s/p endoscopic endonasal transphenoidal Pituitary tumor resection. patient post operatively admitted to NeuroICU. 11/7 sodium 135 and appropriate urine output. Patient appropriate for downgrade to NSG.       PAST MEDICAL & SURGICAL HISTORY:  HTN (hypertension)  HLD (hyperlipidemia)  DM (diabetes mellitus)  GERD (gastroesophageal reflux disease)  H/O: pituitary tumor    No significant past surgical history      FAMILY HISTORY:  No pertinent family history in first degree relatives      Allergies  No Known Allergies  Intolerances    REVIEW OF SYSTEMS  As noted in HPI    HOME MEDICATIONS:  Home Medications:  atorvastatin 20 mg oral tablet: 1 tab(s) orally once a day (at bedtime) (28 Oct 2024 16:14)  losartan 50 mg oral tablet: 1 tab(s) orally once a day (28 Oct 2024 16:14)  metFORMIN 500 mg oral tablet: 1 tab(s) orally 2 times a day (28 Oct 2024 16:14)  metoprolol succinate 50 mg oral tablet, extended release: 1 tab(s) orally 2 times a day (28 Oct 2024 16:14)  NIFEdipine (Eqv-Adalat CC) 60 mg oral tablet, extended release: 1 tab(s) orally once a day (28 Oct 2024 16:14)      MEDICATIONS:  Antibiotics:    Neuro:  acetaminophen     Tablet .. 650 milliGRAM(s) Oral every 6 hours PRN  oxyCODONE    IR 5 milliGRAM(s) Oral every 6 hours PRN    Anticoagulation:    OTHER:  atorvastatin 20 milliGRAM(s) Oral at bedtime  dextrose 50% Injectable 25 Gram(s) IV Push once  dextrose 50% Injectable 25 Gram(s) IV Push once  dextrose 50% Injectable 12.5 Gram(s) IV Push once  dextrose Oral Gel 15 Gram(s) Oral once PRN  glucagon  Injectable 1 milliGRAM(s) IntraMuscular once  insulin lispro (ADMELOG) corrective regimen sliding scale   SubCutaneous three times a day before meals  insulin lispro (ADMELOG) corrective regimen sliding scale   SubCutaneous at bedtime  losartan 50 milliGRAM(s) Oral daily  metoprolol succinate ER 50 milliGRAM(s) Oral two times a day  NIFEdipine XL 60 milliGRAM(s) Oral daily  pantoprazole    Tablet 40 milliGRAM(s) Oral before breakfast  polyethylene glycol 3350 17 Gram(s) Oral daily  senna 2 Tablet(s) Oral at bedtime    IVF:  dextrose 5%. 1000 milliLiter(s) IV Continuous <Continuous>  dextrose 5%. 1000 milliLiter(s) IV Continuous <Continuous>  multivitamin 1 Tablet(s) Oral daily  sodium chloride 0.9% lock flush 3 milliLiter(s) IV Push every 8 hours      Vital Signs Last 24 Hrs  T(C): 36.7 (28 Oct 2024 15:19), Max: 37.1 (27 Oct 2024 16:37)  T(F): 98.1 (28 Oct 2024 15:19), Max: 98.7 (27 Oct 2024 16:37)  HR: 68 (28 Oct 2024 15:19) (61 - 98)  BP: 138/88 (28 Oct 2024 15:19) (130/68 - 162/88)  BP(mean): --  RR: 19 (28 Oct 2024 15:19) (16 - 19)  SpO2: 92% (28 Oct 2024 15:19) (92% - 96%)    Parameters below as of 28 Oct 2024 07:55  Patient On (Oxygen Delivery Method): room air   (28 Oct 2024 16:19)        Vital Signs Last 24 Hrs  T(C): 37 (07 Nov 2024 08:32), Max: 37.7 (07 Nov 2024 04:00)  T(F): 98.6 (07 Nov 2024 08:32), Max: 99.9 (07 Nov 2024 04:00)  HR: 72 (07 Nov 2024 14:00) (71 - 92)  BP: 105/62 (07 Nov 2024 14:00) (103/60 - 153/87)  BP(mean): 73 (07 Nov 2024 14:00) (73 - 106)  RR: 15 (07 Nov 2024 14:00) (6 - 24)  SpO2: 96% (07 Nov 2024 14:00) (94% - 100%)    Parameters below as of 07 Nov 2024 12:00  Patient On (Oxygen Delivery Method): room air    PHYSICAL EXAM:  General: No Acute Distress   Neurological: Awake, alert & oriented to person, place and time, Following Commands, PERRL, EOMI,+ Bilateral temporal hemianopsia.  Face Symmetric, Speech Fluent, Moving all extremities   Muscle Strength: RUE: 5/5 LUE: 5/5 RLE: 5/5 LLE: 5/5  No Drift   Sensation Intact to Light Touch   Cerebellar: There is no dysmetria on finger to nose testing.  Gait : deferred    Pulmonary: non labored breathing, no use of accessory muscles  LABS:                        10.0   11.09 )-----------( 213      ( 07 Nov 2024 04:24 )             29.8     11-07    136  |  101  |  16.2  ----------------------------<  234[H]  4.0   |  22.0  |  0.90    Ca    8.6      07 Nov 2024 10:20  Phos  2.6     11-07  Mg     1.8     11-07      PT/INR - ( 06 Nov 2024 05:31 )   PT: 11.4 sec;   INR: 1.01 ratio         PTT - ( 06 Nov 2024 05:31 )  PTT:28.6 sec  Urinalysis Basic - ( 07 Nov 2024 10:20 )    Color: x / Appearance: x / SG: x / pH: x  Gluc: 234 mg/dL / Ketone: x  / Bili: x / Urobili: x   Blood: x / Protein: x / Nitrite: x   Leuk Esterase: x / RBC: x / WBC x   Sq Epi: x / Non Sq Epi: x / Bacteria: x        11-06 @ 07:01  -  11-07 @ 07:00  --------------------------------------------------------  IN: 1070 mL / OUT: 1230 mL / NET: -160 mL    11-07 @ 07:01  -  11-07 @ 15:08  --------------------------------------------------------  IN: 240 mL / OUT: 145 mL / NET: 95 mL        RADIOLOGY & ADDITIONAL TESTS:  < from: CT Head No Cont (11.07.24 @ 05:56) >  Impression: Recent transsphenoidal surgery with removal of a large sellar   suprasellar mass since 10/27/2024.       CT Sinuses No Cont (11.05.24 @ 11:00)   IMPRESSION:  Large sellar/suprasellar mass, better visualized on MRI 10/28/2024. There   is extension into the right greater than left sphenoid sinus and possibly   into the right sphenoethmoidal recess. Possible bony dehiscence of the   right superior petrous carotid canal, a consideration for surgical   planning.    Inflammatory changes throughout the remainder of the paranasal sinuses as   detailed above.    Mild S-shaped nasal septal deviation. Sinonasal anatomic variations,   detailed above.

## 2024-11-07 NOTE — CONSULT NOTE ADULT - CONSULT REQUESTED DATE/TIME
05-Nov-2024 09:02
29-Oct-2024
06-Nov-2024 17:00
27-Oct-2024 20:05
28-Oct-2024 03:52
06-Nov-2024 15:06
06-Nov-2024 15:58
28-Oct-2024 17:07

## 2024-11-07 NOTE — DIETITIAN INITIAL EVALUATION ADULT - ADD RECOMMEND
Continue current diet as tolerated  Diet education when appropriate  Continue to encouraged adequate po intake  Monitor nutrition related labs, weight trends, BMs and I/Os

## 2024-11-07 NOTE — CONSULT NOTE ADULT - SUBJECTIVE AND OBJECTIVE BOX
HPI:    63M with PMH of DM, HTN, HLD, GERD, on ASA81 daily, known history of pituitary tumor diagnosed 8 years ago in [facility] with no follow up since then, presents with generalized weakness x 1 day, admitted on 10-28. Patient reports difficulty getting out of his chair with unsteady gait since this morning. Denies any recent falls/trauma, vision changes, dizziness, nausea/vomiting, numbness, or tingling. 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 Pt also noted to have TWI on EKG, cards consulted and further w/u pending. Pt reportedly claims to be compliant on home cardiac medications. Pt admitted to neurosurgery for likely surgical resection of pituitary tumor with Dr. Harmon. Now s/p endoscopic endonasal transphenoidal resection of pituitary tumor       Imaging Reviewed Today:  CT SINUSES   PROCEDURE DATE:  11/05/2024  IMPRESSION:  Large sellar/suprasellar mass, better visualized on MRI 10/28/2024. There   is extension into the right greater than left sphenoid sinus and possibly   into the right sphenoethmoidal recess. Possible bony dehiscence of the   right superior petrous carotid canal, a consideration for surgical   planning.    Inflammatory changes throughout the remainder of the paranasal sinuses as   detailed above.    Mild S-shaped nasal septal deviation. Sinonasal anatomic variations,   detailed above.    MR SELLA ONLY WAW IC   & MR BRAIN WAW IC  PROCEDURE DATE:  10/28/2024  IMPRESSION:    1. 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    2. BRAIN:   Ischemic white matter disease greater than typical for age.   Diffuse brain volume loss typical for age.  ----------------------------------------------------------------------------------------  CHIEF COMPLAINT          ----------------------------------------------------------------------------------------  VITALS  T(C): 37.7 (11-07-24 @ 04:00), Max: 37.7 (11-07-24 @ 04:00)  HR: 78 (11-07-24 @ 07:00) (57 - 89)  BP: 143/78 (11-07-24 @ 07:00) (109/82 - 153/87)  RR: 15 (11-07-24 @ 07:00) (6 - 19)  SpO2: 95% (11-07-24 @ 07:00) (94% - 100%)  Wt(kg): --    PAST MEDICAL & SURGICAL HISTORY  HTN (hypertension)    HLD (hyperlipidemia)    DM (diabetes mellitus)    GERD (gastroesophageal reflux disease)    H/O: pituitary tumor    No significant past surgical history          LABS  REVIEWED    CBC Full  -  ( 07 Nov 2024 04:24 )  WBC Count : 11.09 K/uL  RBC Count : 3.55 M/uL  Hemoglobin : 10.0 g/dL  Hematocrit : 29.8 %  Platelet Count - Automated : 213 K/uL  Mean Cell Volume : 83.9 fl  Mean Cell Hemoglobin : 28.2 pg  Mean Cell Hemoglobin Concentration : 33.6 g/dL  Auto Neutrophil # : x  Auto Lymphocyte # : x  Auto Monocyte # : x  Auto Eosinophil # : x  Auto Basophil # : x  Auto Neutrophil % : x  Auto Lymphocyte % : x  Auto Monocyte % : x  Auto Eosinophil % : x  Auto Basophil % : x    11-07    135  |  102  |  14.1  ----------------------------<  233[H]  4.1   |  20.0[L]  |  0.83    Ca    8.7      07 Nov 2024 04:24  Phos  2.6     11-07  Mg     1.8     11-07      Urinalysis Basic - ( 07 Nov 2024 04:24 )    Color: x / Appearance: x / SG: x / pH: x  Gluc: 233 mg/dL / Ketone: x  / Bili: x / Urobili: x   Blood: x / Protein: x / Nitrite: x   Leuk Esterase: x / RBC: x / WBC x   Sq Epi: x / Non Sq Epi: x / Bacteria: x        ALLERGIES  No Known Allergies      MEDICATIONS   acetaminophen     Tablet .. 650 milliGRAM(s) Oral every 6 hours PRN  atorvastatin 20 milliGRAM(s) Oral at bedtime  chlorhexidine 2% Cloths 1 Application(s) Topical <User Schedule>  dextrose 50% Injectable 25 Gram(s) IV Push once  dextrose 50% Injectable 12.5 Gram(s) IV Push once  dextrose 50% Injectable 25 Gram(s) IV Push once  hydrALAZINE Injectable 10 milliGRAM(s) IV Push every 2 hours PRN  HYDROmorphone  Injectable 0.5 milliGRAM(s) IV Push every 6 hours PRN  influenza   Vaccine 0.5 milliLiter(s) IntraMuscular once  insulin glargine Injectable (LANTUS) 8 Unit(s) SubCutaneous at bedtime  insulin lispro (ADMELOG) corrective regimen sliding scale   SubCutaneous Before meals and at bedtime  insulin lispro Injectable (ADMELOG) 8 Unit(s) SubCutaneous three times a day before meals  labetalol Injectable 10 milliGRAM(s) IV Push every 2 hours PRN  metoprolol succinate ER 50 milliGRAM(s) Oral two times a day  NIFEdipine XL 60 milliGRAM(s) Oral daily  ondansetron Injectable 4 milliGRAM(s) IV Push every 6 hours PRN  oxyCODONE    IR 5 milliGRAM(s) Oral every 4 hours PRN  oxyCODONE    IR 10 milliGRAM(s) Oral every 4 hours PRN  pantoprazole    Tablet 40 milliGRAM(s) Oral before breakfast  polyethylene glycol 3350 17 Gram(s) Oral daily  senna 2 Tablet(s) Oral at bedtime  sodium chloride 0.65% Nasal 1 Spray(s) Both Nostrils every 4 hours PRN  sodium chloride 0.9%. 1000 milliLiter(s) IV Continuous <Continuous>        ----------------------------------------------------------------------------------------  FUNCTIONAL HISTORY  Lives with 2 sisters and his niece in an apartment with 0 SAMARA and 0 steps inside. Pt reports amb without device and independent with functional mobility and ADLs. Pt has support of family.      CURRENT FUNCTIONAL STATUS- last assessed prior to surgery   Bed mobility- supervision  Transfers - supervision  ADLs Supervision  Gait CG 75 ft    ----------------------------------------------------------------------------------------  PHYSICAL EXAM  Constitutional - NAD, Comfortable  HEENT - NCAT, EOMI  Neck - Supple, No limited ROM  Chest - Breathing comfortably, No wheezing  Cardiovascular - S1S2   Abdomen - Soft   Extremities - No C/C/E, No calf tenderness   Neurologic Exam -                    Cognitive - AAO to self, place, date, year, situation     Communication - Fluent, No dysarthria     Cranial Nerves - CN 2-12 intact     FUNCTIONAL MOTOR EXAM - No focal deficits                    LEFT    UE - ShAB 5/5, EF 5/5, EE 5/5, WE 5/5,  5/5                    RIGHT UE - ShAB 5/5, EF 5/5, EE 5/5, WE 5/5,  5/5                    LEFT    LE - HF 5/5, KE 5/5, DF 5/5, PF 5/5                    RIGHT LE - HF 5/5, KE 5/5, DF 5/5, PF 5/5        Sensory - Intact to LT     Reflexes - DTR Intact, No primitive reflexive     Coordination - FTN intact     OculoVestibular - No saccades, No nystagmus, VOR         Balance - WNL Static  Psychiatric - Mood stable, Affect WNL  ----------------------------------------------------------------------------------------  ASSESSMENT/PLAN  63yMale with functional deficits after  Pain - Tylenol  DVT PPX - SCDs  Impaired Mobility/Function/Rehab Recommendations -  HPI:    63M with PMH of DM, HTN, HLD, GERD, on ASA81 daily, known history of pituitary tumor diagnosed 8 years ago in [facility] with no follow up since then, presents with generalized weakness x 1 day, admitted on 10-28. Patient reports difficulty getting out of his chair with unsteady gait since this morning. Denies any recent falls/trauma, vision changes, dizziness, nausea/vomiting, numbness, or tingling. 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 Pt also noted to have TWI on EKG, cards consulted and further w/u pending. Pt reportedly claims to be compliant on home cardiac medications. Pt admitted to neurosurgery for likely surgical resection of pituitary tumor with Dr. Harmon. Now s/p endoscopic endonasal transphenoidal resection of pituitary tumor       Imaging Reviewed Today:  CT SINUSES   PROCEDURE DATE:  11/05/2024  IMPRESSION:  Large sellar/suprasellar mass, better visualized on MRI 10/28/2024. There   is extension into the right greater than left sphenoid sinus and possibly   into the right sphenoethmoidal recess. Possible bony dehiscence of the   right superior petrous carotid canal, a consideration for surgical   planning.    Inflammatory changes throughout the remainder of the paranasal sinuses as   detailed above.    Mild S-shaped nasal septal deviation. Sinonasal anatomic variations,   detailed above.    MR SELLA ONLY WAW IC   & MR BRAIN WAW IC  PROCEDURE DATE:  10/28/2024  IMPRESSION:    1. 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    2. BRAIN:   Ischemic white matter disease greater than typical for age.   Diffuse brain volume loss typical for age.  ----------------------------------------------------------------------------------------  CHIEF COMPLAINT    Patient seen and examined at bedside this morning. He relates the events of of his current hospitalization to me. Today he has no new complaints. POD 1 for tumor resection.     ----------------------------------------------------------------------------------------  VITALS  T(C): 37.7 (11-07-24 @ 04:00), Max: 37.7 (11-07-24 @ 04:00)  HR: 78 (11-07-24 @ 07:00) (57 - 89)  BP: 143/78 (11-07-24 @ 07:00) (109/82 - 153/87)  RR: 15 (11-07-24 @ 07:00) (6 - 19)  SpO2: 95% (11-07-24 @ 07:00) (94% - 100%)  Wt(kg): --    PAST MEDICAL & SURGICAL HISTORY  HTN (hypertension)    HLD (hyperlipidemia)    DM (diabetes mellitus)    GERD (gastroesophageal reflux disease)    H/O: pituitary tumor    No significant past surgical history          LABS  REVIEWED    CBC Full  -  ( 07 Nov 2024 04:24 )  WBC Count : 11.09 K/uL  RBC Count : 3.55 M/uL  Hemoglobin : 10.0 g/dL  Hematocrit : 29.8 %  Platelet Count - Automated : 213 K/uL  Mean Cell Volume : 83.9 fl  Mean Cell Hemoglobin : 28.2 pg  Mean Cell Hemoglobin Concentration : 33.6 g/dL  Auto Neutrophil # : x  Auto Lymphocyte # : x  Auto Monocyte # : x  Auto Eosinophil # : x  Auto Basophil # : x  Auto Neutrophil % : x  Auto Lymphocyte % : x  Auto Monocyte % : x  Auto Eosinophil % : x  Auto Basophil % : x    11-07    135  |  102  |  14.1  ----------------------------<  233[H]  4.1   |  20.0[L]  |  0.83    Ca    8.7      07 Nov 2024 04:24  Phos  2.6     11-07  Mg     1.8     11-07      Urinalysis Basic - ( 07 Nov 2024 04:24 )    Color: x / Appearance: x / SG: x / pH: x  Gluc: 233 mg/dL / Ketone: x  / Bili: x / Urobili: x   Blood: x / Protein: x / Nitrite: x   Leuk Esterase: x / RBC: x / WBC x   Sq Epi: x / Non Sq Epi: x / Bacteria: x        ALLERGIES  No Known Allergies      MEDICATIONS   acetaminophen     Tablet .. 650 milliGRAM(s) Oral every 6 hours PRN  atorvastatin 20 milliGRAM(s) Oral at bedtime  chlorhexidine 2% Cloths 1 Application(s) Topical <User Schedule>  dextrose 50% Injectable 25 Gram(s) IV Push once  dextrose 50% Injectable 12.5 Gram(s) IV Push once  dextrose 50% Injectable 25 Gram(s) IV Push once  hydrALAZINE Injectable 10 milliGRAM(s) IV Push every 2 hours PRN  HYDROmorphone  Injectable 0.5 milliGRAM(s) IV Push every 6 hours PRN  influenza   Vaccine 0.5 milliLiter(s) IntraMuscular once  insulin glargine Injectable (LANTUS) 8 Unit(s) SubCutaneous at bedtime  insulin lispro (ADMELOG) corrective regimen sliding scale   SubCutaneous Before meals and at bedtime  insulin lispro Injectable (ADMELOG) 8 Unit(s) SubCutaneous three times a day before meals  labetalol Injectable 10 milliGRAM(s) IV Push every 2 hours PRN  metoprolol succinate ER 50 milliGRAM(s) Oral two times a day  NIFEdipine XL 60 milliGRAM(s) Oral daily  ondansetron Injectable 4 milliGRAM(s) IV Push every 6 hours PRN  oxyCODONE    IR 5 milliGRAM(s) Oral every 4 hours PRN  oxyCODONE    IR 10 milliGRAM(s) Oral every 4 hours PRN  pantoprazole    Tablet 40 milliGRAM(s) Oral before breakfast  polyethylene glycol 3350 17 Gram(s) Oral daily  senna 2 Tablet(s) Oral at bedtime  sodium chloride 0.65% Nasal 1 Spray(s) Both Nostrils every 4 hours PRN  sodium chloride 0.9%. 1000 milliLiter(s) IV Continuous <Continuous>        ----------------------------------------------------------------------------------------  FUNCTIONAL HISTORY  Lives with 2 sisters and his niece in an apartment with 0 SAMARA and 0 steps inside. Pt reports amb without device and independent with functional mobility and ADLs. Pt has support of family.      CURRENT FUNCTIONAL STATUS- last assessed prior to surgery   Bed mobility- supervision  Transfers - supervision  ADLs Supervision  Gait CG 75 ft    ----------------------------------------------------------------------------------------  PHYSICAL EXAM  Constitutional - NAD, Comfortable  HEENT - NCAT, EOMI, small amount of dried blood in R nares  Neck - Supple, No limited ROM  Chest - Breathing comfortably, No wheezing  Cardiovascular - S1S2   Abdomen - Soft   Extremities - No C/C/E, No calf tenderness   Neurologic Exam -                    Cognitive - AAO to self, place, date, year, situation     Communication - Fluent, No dysarthria     Cranial Nerves - EOMI, PERRLA, Confrontation intact, Facial sensation intact, Facial Motor intact, Hearing intact b/l, Shoulder shurg intact b/l, Hypoglossal intact     FUNCTIONAL MOTOR EXAM - No focal deficits, Some difficulty following commands but seemed to be more of translation issue                    LEFT    UE - ShAB 5/5, EF 5/5, EE 5/5, WE 5/5,  5/5                    RIGHT UE - ShAB 5/5, EF 5/5, EE 5/5, WE 5/5,  5/5                    LEFT    LE - HF 5/5, KE 5/5, DF 5/5, PF 5/5                    RIGHT LE - HF 5/5, KE 5/5, DF 5/5, PF 5/5        Sensory - Intact to LT     Reflexes - DTR Intact, No primitive reflexive     Coordination - FTN intact     OculoVestibular - No saccades, No nystagmus, VOR         Balance - WNL Static  Psychiatric - Mood stable, Affect WNL  ----------------------------------------------------------------------------------------  ASSESSMENT/PLAN    63M with PMH of DM, HTN, HLD, GERD, on ASA81 daily, known history of pituitary tumor diagnosed 8 years ago in [facility] with no follow up since then admitted on 10-28. Now s/p endoscopic endonasal transphenoidal resection of pituitary tumor. Patient today with no new neurologic or functional deficits.     #Pituitary tumor, s/p resection  -No new functional deficits noted. patient has good support at home   -Unclear if patient was having dysphagia from my discussion with him. Would get proper SLP eval.  SLP needs alone would not warrant discharge to rehab unit or [facility]. ENT already following  -No further PT/OT needs  -MRI pending today    #Other comorbidities  - Continue medical management with primary team     Pain - Tylenol  DVT PPX - SCDs    Recommend Dc home when medically stable HPI:    63M with PMH of DM, HTN, HLD, GERD, on ASA81 daily, known history of pituitary tumor diagnosed 8 years ago in [facility] with no follow up since then, presents with generalized weakness x 1 day, admitted on 10-28. Patient reports difficulty getting out of his chair with unsteady gait since this morning. Denies any recent falls/trauma, vision changes, dizziness, nausea/vomiting, numbness, or tingling. 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 Pt also noted to have TWI on EKG, cards consulted and further w/u pending. Pt reportedly claims to be compliant on home cardiac medications. Pt admitted to neurosurgery for likely surgical resection of pituitary tumor with Dr. Harmon. Now s/p endoscopic endonasal transphenoidal resection of pituitary tumor       Imaging Reviewed Today:  CT SINUSES   PROCEDURE DATE:  11/05/2024  IMPRESSION:  Large sellar/suprasellar mass, better visualized on MRI 10/28/2024. There   is extension into the right greater than left sphenoid sinus and possibly   into the right sphenoethmoidal recess. Possible bony dehiscence of the   right superior petrous carotid canal, a consideration for surgical   planning.    Inflammatory changes throughout the remainder of the paranasal sinuses as   detailed above.    Mild S-shaped nasal septal deviation. Sinonasal anatomic variations,   detailed above.    MR SELLA ONLY WAW IC   & MR BRAIN WAW IC  PROCEDURE DATE:  10/28/2024  IMPRESSION:    1. 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    2. BRAIN:   Ischemic white matter disease greater than typical for age.   Diffuse brain volume loss typical for age.    HEAD CT 11/7 - Recent transsphenoidal surgery with removal of a large sellar suprasellar mass since 10/27/2024.  ----------------------------------------------------------------------------------------  CHIEF COMPLAINT    Patient seen and examined at bedside this morning. He relates the events of of his current hospitalization to me. Today he has no new complaints. POD 1 for tumor resection.     ----------------------------------------------------------------------------------------  VITALS  T(C): 37.7 (11-07-24 @ 04:00), Max: 37.7 (11-07-24 @ 04:00)  HR: 78 (11-07-24 @ 07:00) (57 - 89)  BP: 143/78 (11-07-24 @ 07:00) (109/82 - 153/87)  RR: 15 (11-07-24 @ 07:00) (6 - 19)  SpO2: 95% (11-07-24 @ 07:00) (94% - 100%)  Wt(kg): --    PAST MEDICAL & SURGICAL HISTORY  HTN (hypertension)    HLD (hyperlipidemia)    DM (diabetes mellitus)    GERD (gastroesophageal reflux disease)    H/O: pituitary tumor    No significant past surgical history          LABS  REVIEWED    CBC Full  -  ( 07 Nov 2024 04:24 )  WBC Count : 11.09 K/uL  RBC Count : 3.55 M/uL  Hemoglobin : 10.0 g/dL  Hematocrit : 29.8 %  Platelet Count - Automated : 213 K/uL  Mean Cell Volume : 83.9 fl  Mean Cell Hemoglobin : 28.2 pg  Mean Cell Hemoglobin Concentration : 33.6 g/dL  Auto Neutrophil # : x  Auto Lymphocyte # : x  Auto Monocyte # : x  Auto Eosinophil # : x  Auto Basophil # : x  Auto Neutrophil % : x  Auto Lymphocyte % : x  Auto Monocyte % : x  Auto Eosinophil % : x  Auto Basophil % : x    11-07    135  |  102  |  14.1  ----------------------------<  233[H]  4.1   |  20.0[L]  |  0.83    Ca    8.7      07 Nov 2024 04:24  Phos  2.6     11-07  Mg     1.8     11-07      Urinalysis Basic - ( 07 Nov 2024 04:24 )    Color: x / Appearance: x / SG: x / pH: x  Gluc: 233 mg/dL / Ketone: x  / Bili: x / Urobili: x   Blood: x / Protein: x / Nitrite: x   Leuk Esterase: x / RBC: x / WBC x   Sq Epi: x / Non Sq Epi: x / Bacteria: x        ALLERGIES  No Known Allergies      MEDICATIONS   acetaminophen     Tablet .. 650 milliGRAM(s) Oral every 6 hours PRN  atorvastatin 20 milliGRAM(s) Oral at bedtime  chlorhexidine 2% Cloths 1 Application(s) Topical <User Schedule>  dextrose 50% Injectable 25 Gram(s) IV Push once  dextrose 50% Injectable 12.5 Gram(s) IV Push once  dextrose 50% Injectable 25 Gram(s) IV Push once  hydrALAZINE Injectable 10 milliGRAM(s) IV Push every 2 hours PRN  HYDROmorphone  Injectable 0.5 milliGRAM(s) IV Push every 6 hours PRN  influenza   Vaccine 0.5 milliLiter(s) IntraMuscular once  insulin glargine Injectable (LANTUS) 8 Unit(s) SubCutaneous at bedtime  insulin lispro (ADMELOG) corrective regimen sliding scale   SubCutaneous Before meals and at bedtime  insulin lispro Injectable (ADMELOG) 8 Unit(s) SubCutaneous three times a day before meals  labetalol Injectable 10 milliGRAM(s) IV Push every 2 hours PRN  metoprolol succinate ER 50 milliGRAM(s) Oral two times a day  NIFEdipine XL 60 milliGRAM(s) Oral daily  ondansetron Injectable 4 milliGRAM(s) IV Push every 6 hours PRN  oxyCODONE    IR 5 milliGRAM(s) Oral every 4 hours PRN  oxyCODONE    IR 10 milliGRAM(s) Oral every 4 hours PRN  pantoprazole    Tablet 40 milliGRAM(s) Oral before breakfast  polyethylene glycol 3350 17 Gram(s) Oral daily  senna 2 Tablet(s) Oral at bedtime  sodium chloride 0.65% Nasal 1 Spray(s) Both Nostrils every 4 hours PRN  sodium chloride 0.9%. 1000 milliLiter(s) IV Continuous <Continuous>        ----------------------------------------------------------------------------------------  FUNCTIONAL HISTORY  Lives with 2 sisters and his niece in an apartment with 0 SAMARA and 0 steps inside. Pt reports amb without device and independent with functional mobility and ADLs. Pt has support of family.      CURRENT FUNCTIONAL STATUS- last assessed prior to surgery   11/7 PT  Bed Mobility: Supine to Sit:     · Level of [place]	contact guard  · Physical Assist/Nonphysical Assist	1 person assist    Bed Mobility Analysis:     · Impairments Contributing to Impaired Bed Mobility	decreased strength; impaired balance    Transfer: Sit to Stand:     · Level of [place]	contact guard  · Physical Assist/Nonphysical Assist	1 person assist  · Weight-Bearing Restrictions	weight-bearing as tolerated    Transfer: Stand to Sit:     · Level of [place]	minimum assist (75% patients effort)  · Physical Assist/Nonphysical Assist	1 person assist  · Weight-Bearing Restrictions	weight-bearing as tolerated    Sit/Stand Transfer Safety Analysis:     · Transfer Safety Concerns Noted	decreased weight-shifting ability  · Impairments Contributing to Impaired Transfers	impaired balance; decreased strength; impaired postural control    Gait Skills:     · Level of [place]	pt initially ambulated ~ 20 feet without assist device, slow saranya, wide TERRANCE and small step length. Performance improves with RW however, contact guard still needed 2*2 decreased safety awareness and forward flexed posture.  · Physical Assist/Nonphysical Assist	1 person assist  · Weight-Bearing Restrictions	weight-bearing as tolerated  · Assistive Device	with/without RW  · Gait Distance	150 feet; + 20 feet    Gait Analysis:     · Gait Pattern Used	2-point gait  · Gait Deviations Noted	decreased saranya; decreased weight-shifting ability  · Impairments Contributing to Gait Deviations	impaired balance; impaired postural control    Stair Negotiation:     · Level of [place]	minimum assist (75% patients effort)  · Physical Assist/Nonphysical Assist	1 person assist  · Weight-Bearing Restrictions	weight-bearing as tolerated  · Assistive Device	right rail up  · Stair Pattern	step to step  · Number of Stairs	1    ----------------------------------------------------------------------------------------  PHYSICAL EXAM  Constitutional - NAD, Comfortable  HEENT - NCAT, EOMI, small amount of dried blood in R nares  Neck - Supple, No limited ROM  Chest - Breathing comfortably, No wheezing  Cardiovascular - S1S2   Abdomen - Soft   Extremities - No C/C/E, No calf tenderness   Neurologic Exam -                    Cognitive - AAO to self, place, date, year, situation     Communication - Fluent, No dysarthria     Cranial Nerves - EOMI, PERRLA, Confrontation intact, Facial sensation intact, Facial Motor intact, Hearing intact b/l, Shoulder shurg intact b/l, Hypoglossal intact     FUNCTIONAL MOTOR EXAM - No focal deficits, Some difficulty following commands but seemed to be more of translation issue                    LEFT    UE - ShAB 5/5, EF 5/5, EE 5/5, WE 5/5,  5/5                    RIGHT UE - ShAB 5/5, EF 5/5, EE 5/5, WE 5/5,  5/5                    LEFT    LE - HF 5/5, KE 5/5, DF 5/5, PF 5/5                    RIGHT LE - HF 5/5, KE 5/5, DF 5/5, PF 5/5        Sensory - Intact to LT     Reflexes - DTR Intact, No primitive reflexive     Coordination - FTN intact     OculoVestibular - No saccades, No nystagmus, VOR         Balance - WNL Static  Psychiatric - Mood stable, Affect WNL  ----------------------------------------------------------------------------------------  ASSESSMENT/PLAN    63M with PMH of DM, HTN, HLD, GERD, on ASA81 daily, known history of pituitary tumor diagnosed 8 years ago in [facility] with no follow up since then admitted on 10-28. Now s/p endoscopic endonasal transphenoidal resection of pituitary tumor. Patient today with no new neurologic or functional deficits.     #Pituitary tumor, s/p resection  -No new functional deficits noted. patient has good support at home   -Unclear if patient was having dysphagia from my discussion with him. Would get proper SLP eval.  SLP needs alone would not warrant discharge to rehab unit or [facility]. ENT already following  -No further PT/OT needs  -MRI pending today    #Other comorbidities  - Continue medical management with primary team     Pain - Tylenol  DVT PPX - SCDs    Recommend Dc home when medically stable

## 2024-11-07 NOTE — PHYSICAL THERAPY INITIAL EVALUATION ADULT - GENERAL OBSERVATIONS, REHAB EVAL
Pt received in bed +IV on room air, pleasant and cooperative. Pt [place] Creole speaking with language lines [phone]  used
Pt received supine in bed + telemetry//BP + IV + Venous Compression Boots + [place] + miner. [facility] Creole video  Xiao ID # [id number]. Pt c/o 0/10 pain, pt agreeable to PT

## 2024-11-07 NOTE — OCCUPATIONAL THERAPY INITIAL EVALUATION ADULT - VISUAL ACUITY
+reading glasses. No complaints offered. Central and peripheral fields intact bilaterally except for L eye; impairments noted, multiple verbal cues needed to maintain central gaze to test peripheral vision.

## 2024-11-07 NOTE — PHYSICAL THERAPY INITIAL EVALUATION ADULT - ASSISTIVE DEVICE FOR TRANSFER: GAIT, REHAB EVAL
amb without device, however requires hand-held / CG assist with turns due to decreased balance- may benefit from RW
with/without RW

## 2024-11-07 NOTE — PHYSICAL THERAPY INITIAL EVALUATION ADULT - IMPAIRED TRANSFERS: SIT/STAND, REHAB EVAL
impaired balance/decreased flexibility
impaired balance/impaired postural control/decreased strength

## 2024-11-07 NOTE — DIETITIAN INITIAL EVALUATION ADULT - OTHER INFO
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 Harmon and Dr. Baeza.

## 2024-11-07 NOTE — OCCUPATIONAL THERAPY INITIAL EVALUATION ADULT - PATIENT/FAMILY/SIGNIFICANT OTHER GOALS STATEMENT, OT EVAL
Impression: Dry eye syndrome of bilateral lacrimal glands: H04.123. Plan: Dry eyes - Recommend use of high quality artificial tears 3-4x per day prn.
To go home
get better

## 2024-11-07 NOTE — PROGRESS NOTE ADULT - TIME BILLING
Time spent reviewing the chart documentation, reviewing labs and imaging studies, evaluating the patient, discussing the plan of care with the treatment team, and documenting.
64yo M with pituitary mass, s/p TSP resection 11/6.  PMH of DM, HTN, HLD, GERD.    Plan:  - neurochecks, cont  - MRI Brain w/wo, pending report  - Transphenoidal precautions, monitor for CSF leak  - SBP goal 100-160, PRN meds  - maintain OSats>92%  - monitor e-lytes, strict i/os, keep He for now, re-eval in 24 hrs  - diet as tolerated, BM regimen, PPI ppx  - DVT ppx: SCDs, SQL tonight  - FS goal 120-180, insulin regimen adjusted  - stable to be downgraded to SDU           Time spent included review of relevant history, clinical examination, review of data and images, discussion of treatment with the multidisciplinary team and any consultants involved in this patient’s care,
normal

## 2024-11-07 NOTE — PHYSICAL THERAPY INITIAL EVALUATION ADULT - IMPAIRMENTS FOUND, PT EVAL
aerobic capacity/endurance/gait, locomotion, and balance/muscle strength
gait, locomotion, and balance/muscle strength/posture

## 2024-11-07 NOTE — PHYSICAL THERAPY INITIAL EVALUATION ADULT - ADDITIONAL COMMENTS
Pt reports living with 2 sisters and his niece in an apartment with 0 SAMARA and 0 steps inside. Pt reports amb without device and independent with functional mobility and ADLs. Pt has support of family.
Pt lives in an apartment with his 2 sisters and niece. 1 steps to enter without handrails, no steps inside. Pt was independent PTA without an assist device. Pt owns no DME.

## 2024-11-07 NOTE — PHYSICAL THERAPY INITIAL EVALUATION ADULT - GAIT DEVIATIONS NOTED, PT EVAL
decreased saranya/decreased weight-shifting ability
decreased saranya/decreased step length/decreased stride length

## 2024-11-07 NOTE — PROGRESS NOTE ADULT - ASSESSMENT
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 Harmon and Dr. Baeza, POD#0. Patient seen lying comfortably in bed. Patient c/o frontal headache that is improving with pain medication. Denies vision changes, numbness/tingling, weakness, or n/v.      Plan:     NEURO:  CT Head:  Recent transsphenoidal surgery with removal of a large sellar suprasellar mass since 10/27/2024.Pain control   - Pending MR brain w/ and w/o.   -PRN: tylenol| Oxy 5/10| dilaudid 0.5     PULM:  Pituitary precautions (no incentive spirometry, no straws, no BIPAP)  maintain SpO2 >92%  - please continue to use OCean spray QID.   CV:  -160mmHg  c/w atorvastatin 20   HTN meds: losartan 50 qd, metoprolol ER 50 BID, Nifedipine XL 60 mg QD   PRN HTN meds    RENAL:  - keep miner till 11/8 for accurate in/out   Drink to thirst  - transitioned  to BMP q8. POD 3 transition To BMP BID     GI:  Diet: regular diet   Bowel regimen   ENDO:   - Lantus 12, admelog 10. Endo reccs appreciated for Insulin coverage.   - f/u Pituitary labs  - Monitor for DI  - 11/7 AM cortisol 5/0, recheck  AM cortisol x2 days.   - Free thyroxine in AM 11/8   - Endo following, reccs appreciated    HEME/ONC:  VTE prophylaxis: [] SCDs,Lovenox to start tonight.     ID:  maintain normothermia    PT: dispo : rolling walker  Outpatient PT; balance PT

## 2024-11-07 NOTE — OCCUPATIONAL THERAPY INITIAL EVALUATION ADULT - GENERAL OBSERVATIONS, REHAB EVAL
language line  ID: [id number]
Pt is Green[place] Creole speaking. Use of ipad  for OT evaluation.  name: Isela  ID#: [id number]. Received pt supine in bed, NAD, +IV, +Tele//BP, +A-line, +He, +b/l VCB on RA. Pre/post pain 0/10. Pt agreeable to OT evaluation

## 2024-11-07 NOTE — PROGRESS NOTE ADULT - SUBJECTIVE AND OBJECTIVE BOX
INTERVAL EVENTS:  Follow up pituitary resection, diabetes. No acute complaints, endorses good appetite. Denies headache, nausea, or excessive thirst.     MEDICATIONS  (STANDING):  atorvastatin 20 milliGRAM(s) Oral at bedtime  chlorhexidine 2% Cloths 1 Application(s) Topical <User Schedule>  dextrose 50% Injectable 25 Gram(s) IV Push once  dextrose 50% Injectable 12.5 Gram(s) IV Push once  dextrose 50% Injectable 25 Gram(s) IV Push once  enoxaparin Injectable 40 milliGRAM(s) SubCutaneous <User Schedule>  influenza   Vaccine 0.5 milliLiter(s) IntraMuscular once  insulin glargine Injectable (LANTUS) 12 Unit(s) SubCutaneous at bedtime  insulin lispro (ADMELOG) corrective regimen sliding scale   SubCutaneous Before meals and at bedtime  insulin lispro Injectable (ADMELOG) 10 Unit(s) SubCutaneous three times a day before meals  metoprolol succinate ER 50 milliGRAM(s) Oral two times a day  NIFEdipine XL 60 milliGRAM(s) Oral daily  pantoprazole    Tablet 40 milliGRAM(s) Oral before breakfast  polyethylene glycol 3350 17 Gram(s) Oral daily  senna 2 Tablet(s) Oral at bedtime    MEDICATIONS  (PRN):  acetaminophen     Tablet .. 650 milliGRAM(s) Oral every 6 hours PRN Mild Pain (1 - 3)  hydrALAZINE Injectable 10 milliGRAM(s) IV Push every 2 hours PRN SBP > 160mm Hg  HYDROmorphone  Injectable 0.5 milliGRAM(s) IV Push every 6 hours PRN Severe Pain (7 - 10)  labetalol Injectable 10 milliGRAM(s) IV Push every 2 hours PRN Systolic blood pressure >160  ondansetron Injectable 4 milliGRAM(s) IV Push every 6 hours PRN Nausea and/or Vomiting  oxyCODONE    IR 5 milliGRAM(s) Oral every 4 hours PRN Moderate Pain (4 - 6)  oxyCODONE    IR 10 milliGRAM(s) Oral every 4 hours PRN Severe Pain (7 - 10)  sodium chloride 0.65% Nasal 1 Spray(s) Both Nostrils every 4 hours PRN Nasal Congestion    Allergies  No Known Allergies    Vital Signs Last 24 Hrs  T(C): 37 (07 Nov 2024 08:32), Max: 37.7 (07 Nov 2024 04:00)  T(F): 98.6 (07 Nov 2024 08:32), Max: 99.9 (07 Nov 2024 04:00)  HR: 75 (07 Nov 2024 10:00) (75 - 92)  BP: 110/63 (07 Nov 2024 10:00) (109/82 - 153/87)  BP(mean): 79 (07 Nov 2024 10:00) (79 - 106)  RR: 14 (07 Nov 2024 10:00) (6 - 19)  SpO2: 96% (07 Nov 2024 10:00) (94% - 100%)    Parameters below as of 07 Nov 2024 08:00  Patient On (Oxygen Delivery Method): room air    PHYSICAL EXAM:  General: No apparent distress  Respiratory: Lungs clear bilaterally  Cardiac: +S1, S2, no m/r/g  GI: +BS, soft, non tender, non distended  Extremities: No peripheral edema,  Neuro: A+O X3      LABS:                        10.0   11.09 )-----------( 213      ( 07 Nov 2024 04:24 )             29.8     11-07    136  |  101  |  16.2  ----------------------------<  234[H]  4.0   |  22.0  |  0.90    Ca    8.6      07 Nov 2024 10:20  Phos  2.6     11-07  Mg     1.8     11-07      Urinalysis Basic - ( 07 Nov 2024 10:20 )    Color: x / Appearance: x / SG: x / pH: x  Gluc: 234 mg/dL / Ketone: x  / Bili: x / Urobili: x   Blood: x / Protein: x / Nitrite: x   Leuk Esterase: x / RBC: x / WBC x   Sq Epi: x / Non Sq Epi: x / Bacteria: x    POCT Blood Glucose.: 241 mg/dL (11-07-24 @ 12:03)  POCT Blood Glucose.: 229 mg/dL (11-07-24 @ 08:12)  POCT Blood Glucose.: 298 mg/dL (11-06-24 @ 22:45)  POCT Blood Glucose.: 300 mg/dL (11-06-24 @ 15:49)    Thyroid Stimulating Hormone, Serum: 0.37 uIU/mL (11-07-24 @ 04:24)  Free Thyroxine, Serum: 1.1 ng/dL (10-30-24 @ 05:00)  Thyroid Stimulating Hormone, Serum: 0.60 uIU/mL (10-29-24 @ 05:12)  Free Thyroxine, Serum: 1.0 ng/dL (10-29-24 @ 05:12)  Thyroid Stimulating Hormone, Serum: 0.38 uIU/mL (10-28-24 @ 00:47)

## 2024-11-07 NOTE — CONSULT NOTE ADULT - SUBJECTIVE AND OBJECTIVE BOX
HPI:  HPI: 63M with PMH of DM, HTN, HLD, GERD, on ASA81 daily, known history of pituitary tumor diagnosed 8 years ago in [facility] with no follow up since then, presents with generalized weakness x 1 day. Patient reports difficulty getting out of his chair with unsteady gait since this morning. Patient currently c/o mild headache and burning with urination. Denies any recent falls/trauma, vision changes, dizziness, nausea/vomiting, numbness, or tingling. 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 Pt also noted to have TWI on EKG, cards consulted and further w/u pending. Pt reportedly claims to be compliant on home cardiac medications. Pt admitted to neurosurgery for likely surgical resection of pituitary tumor with Dr. Harmon.    PAST MEDICAL & SURGICAL HISTORY:  HTN (hypertension)  HLD (hyperlipidemia)  DM (diabetes mellitus)  GERD (gastroesophageal reflux disease)  H/O: pituitary tumor    No significant past surgical history      FAMILY HISTORY:  No pertinent family history in first degree relatives      Allergies  No Known Allergies  Intolerances    REVIEW OF SYSTEMS  As noted in HPI    HOME MEDICATIONS:  Home Medications:  atorvastatin 20 mg oral tablet: 1 tab(s) orally once a day (at bedtime) (28 Oct 2024 16:14)  losartan 50 mg oral tablet: 1 tab(s) orally once a day (28 Oct 2024 16:14)  metFORMIN 500 mg oral tablet: 1 tab(s) orally 2 times a day (28 Oct 2024 16:14)  metoprolol succinate 50 mg oral tablet, extended release: 1 tab(s) orally 2 times a day (28 Oct 2024 16:14)  NIFEdipine (Eqv-Adalat CC) 60 mg oral tablet, extended release: 1 tab(s) orally once a day (28 Oct 2024 16:14)      MEDICATIONS:  Antibiotics:    Neuro:  acetaminophen     Tablet .. 650 milliGRAM(s) Oral every 6 hours PRN  oxyCODONE    IR 5 milliGRAM(s) Oral every 6 hours PRN    Anticoagulation:    OTHER:  atorvastatin 20 milliGRAM(s) Oral at bedtime  dextrose 50% Injectable 25 Gram(s) IV Push once  dextrose 50% Injectable 25 Gram(s) IV Push once  dextrose 50% Injectable 12.5 Gram(s) IV Push once  dextrose Oral Gel 15 Gram(s) Oral once PRN  glucagon  Injectable 1 milliGRAM(s) IntraMuscular once  insulin lispro (ADMELOG) corrective regimen sliding scale   SubCutaneous three times a day before meals  insulin lispro (ADMELOG) corrective regimen sliding scale   SubCutaneous at bedtime  losartan 50 milliGRAM(s) Oral daily  metoprolol succinate ER 50 milliGRAM(s) Oral two times a day  NIFEdipine XL 60 milliGRAM(s) Oral daily  pantoprazole    Tablet 40 milliGRAM(s) Oral before breakfast  polyethylene glycol 3350 17 Gram(s) Oral daily  senna 2 Tablet(s) Oral at bedtime    IVF:  dextrose 5%. 1000 milliLiter(s) IV Continuous <Continuous>  dextrose 5%. 1000 milliLiter(s) IV Continuous <Continuous>  multivitamin 1 Tablet(s) Oral daily  sodium chloride 0.9% lock flush 3 milliLiter(s) IV Push every 8 hours    PHYSICAL EXAM:  GENERAL: NAD  HEAD: No nasal leaking  AMARIS COMA SCORE: E-4 V-5 M-6 = 15  MENTAL STATUS: AAO x3; Awake; Opens eyes spontaneously; Appropriately conversant without aphasia; following simple commands  CRANIAL NERVES: B/l pupils 3mm sluggish. EOMI without nystagmus. Facial sensation intact V1-3 distribution b/l. Face symmetric w/ normal eye closure and smile, tongue midline. Hearing grossly intact. Speech clear.   MOTOR: strength 5/5 b/l upper and lower extremities  SENSATION: grossly intact to light touch all extremities  SKIN: Warm, dry    Vital Signs Last 24 Hrs  T(C): 36.7 (28 Oct 2024 15:19), Max: 37.1 (27 Oct 2024 16:37)  T(F): 98.1 (28 Oct 2024 15:19), Max: 98.7 (27 Oct 2024 16:37)  HR: 68 (28 Oct 2024 15:19) (61 - 98)  BP: 138/88 (28 Oct 2024 15:19) (130/68 - 162/88)  BP(mean): --  RR: 19 (28 Oct 2024 15:19) (16 - 19)  SpO2: 92% (28 Oct 2024 15:19) (92% - 96%)    Parameters below as of 28 Oct 2024 07:55  Patient On (Oxygen Delivery Method): room air   (28 Oct 2024 16:19)    Vital Signs Last 24 Hrs  T(C): 37 (07 Nov 2024 08:32), Max: 37.7 (07 Nov 2024 04:00)  T(F): 98.6 (07 Nov 2024 08:32), Max: 99.9 (07 Nov 2024 04:00)  HR: 72 (07 Nov 2024 14:00) (71 - 92)  BP: 105/62 (07 Nov 2024 14:00) (103/60 - 153/87)  BP(mean): 73 (07 Nov 2024 14:00) (73 - 106)  RR: 15 (07 Nov 2024 14:00) (6 - 24)  SpO2: 96% (07 Nov 2024 14:00) (94% - 100%)    Parameters below as of 07 Nov 2024 12:00  Patient On (Oxygen Delivery Method): room air          LABS:                        10.0   11.09 )-----------( 213      ( 07 Nov 2024 04:24 )             29.8     11-07    136  |  101  |  16.2  ----------------------------<  234[H]  4.0   |  22.0  |  0.90    Ca    8.6      07 Nov 2024 10:20  Phos  2.6     11-07  Mg     1.8     11-07      PT/INR - ( 06 Nov 2024 05:31 )   PT: 11.4 sec;   INR: 1.01 ratio         PTT - ( 06 Nov 2024 05:31 )  PTT:28.6 sec  Urinalysis Basic - ( 07 Nov 2024 10:20 )    Color: x / Appearance: x / SG: x / pH: x  Gluc: 234 mg/dL / Ketone: x  / Bili: x / Urobili: x   Blood: x / Protein: x / Nitrite: x   Leuk Esterase: x / RBC: x / WBC x   Sq Epi: x / Non Sq Epi: x / Bacteria: x        11-06 @ 07:01  -  11-07 @ 07:00  --------------------------------------------------------  IN: 1070 mL / OUT: 1230 mL / NET: -160 mL    11-07 @ 07:01  -  11-07 @ 14:59  --------------------------------------------------------  IN: 240 mL / OUT: 145 mL / NET: 95 mL        RADIOLOGY & ADDITIONAL TESTS:     CT Head No Cont (11.07.24 @ 05:56)     Impression: Recent transsphenoidal surgery with removal of a large sellar   suprasellar mass since 10/27/2024.       CT Sinuses No Cont (11.05.24 @ 11:00)     IMPRESSION:  Large sellar/suprasellar mass, better visualized on MRI 10/28/2024. There   is extension into the right greater than left sphenoid sinus and possibly   into the right sphenoethmoidal recess. Possible bony dehiscence of the   right superior petrous carotid canal, a consideration for surgical   planning.    Inflammatory changes throughout the remainder of the paranasal sinuses as   detailed above.    Mild S-shaped nasal septal deviation. Sinonasal anatomic variations,   detailed above.

## 2024-11-07 NOTE — PHYSICAL THERAPY INITIAL EVALUATION ADULT - PLANNED THERAPY INTERVENTIONS, PT EVAL
Unit RN to OR RN
balance training/bed mobility training/gait training/neuromuscular re-education/postural re-education/strengthening/transfer training
bed mobility training/gait training/transfer training

## 2024-11-08 PROBLEM — Z00.00 ENCOUNTER FOR PREVENTIVE HEALTH EXAMINATION: Status: ACTIVE | Noted: 2024-11-08

## 2024-11-08 LAB
ANION GAP SERPL CALC-SCNC: 11 MMOL/L — SIGNIFICANT CHANGE UP (ref 5–17)
ANION GAP SERPL CALC-SCNC: 14 MMOL/L — SIGNIFICANT CHANGE UP (ref 5–17)
ANION GAP SERPL CALC-SCNC: 8 MMOL/L — SIGNIFICANT CHANGE UP (ref 5–17)
BUN SERPL-MCNC: 14.6 MG/DL — SIGNIFICANT CHANGE UP (ref 8–20)
BUN SERPL-MCNC: 15.3 MG/DL — SIGNIFICANT CHANGE UP (ref 8–20)
BUN SERPL-MCNC: 16.2 MG/DL — SIGNIFICANT CHANGE UP (ref 8–20)
CALCIUM SERPL-MCNC: 8.6 MG/DL — SIGNIFICANT CHANGE UP (ref 8.4–10.5)
CALCIUM SERPL-MCNC: 8.8 MG/DL — SIGNIFICANT CHANGE UP (ref 8.4–10.5)
CALCIUM SERPL-MCNC: 9.1 MG/DL — SIGNIFICANT CHANGE UP (ref 8.4–10.5)
CHLORIDE SERPL-SCNC: 103 MMOL/L — SIGNIFICANT CHANGE UP (ref 96–108)
CHLORIDE SERPL-SCNC: 106 MMOL/L — SIGNIFICANT CHANGE UP (ref 96–108)
CHLORIDE SERPL-SCNC: 107 MMOL/L — SIGNIFICANT CHANGE UP (ref 96–108)
CO2 SERPL-SCNC: 25 MMOL/L — SIGNIFICANT CHANGE UP (ref 22–29)
CO2 SERPL-SCNC: 25 MMOL/L — SIGNIFICANT CHANGE UP (ref 22–29)
CO2 SERPL-SCNC: 26 MMOL/L — SIGNIFICANT CHANGE UP (ref 22–29)
CORTIS AM PEAK SERPL-MCNC: 3.6 UG/DL — LOW (ref 6–18.4)
CREAT SERPL-MCNC: 0.89 MG/DL — SIGNIFICANT CHANGE UP (ref 0.5–1.3)
CREAT SERPL-MCNC: 1.02 MG/DL — SIGNIFICANT CHANGE UP (ref 0.5–1.3)
CREAT SERPL-MCNC: 1.03 MG/DL — SIGNIFICANT CHANGE UP (ref 0.5–1.3)
EGFR: 82 ML/MIN/1.73M2 — SIGNIFICANT CHANGE UP
EGFR: 83 ML/MIN/1.73M2 — SIGNIFICANT CHANGE UP
EGFR: 96 ML/MIN/1.73M2 — SIGNIFICANT CHANGE UP
GLUCOSE BLDC GLUCOMTR-MCNC: 130 MG/DL — HIGH (ref 70–99)
GLUCOSE BLDC GLUCOMTR-MCNC: 191 MG/DL — HIGH (ref 70–99)
GLUCOSE BLDC GLUCOMTR-MCNC: 193 MG/DL — HIGH (ref 70–99)
GLUCOSE BLDC GLUCOMTR-MCNC: 254 MG/DL — HIGH (ref 70–99)
GLUCOSE SERPL-MCNC: 116 MG/DL — HIGH (ref 70–99)
GLUCOSE SERPL-MCNC: 120 MG/DL — HIGH (ref 70–99)
GLUCOSE SERPL-MCNC: 159 MG/DL — HIGH (ref 70–99)
HCT VFR BLD CALC: 29.7 % — LOW (ref 39–50)
HGB BLD-MCNC: 10 G/DL — LOW (ref 13–17)
MAGNESIUM SERPL-MCNC: 2.2 MG/DL — SIGNIFICANT CHANGE UP (ref 1.6–2.6)
MCHC RBC-ENTMCNC: 28.4 PG — SIGNIFICANT CHANGE UP (ref 27–34)
MCHC RBC-ENTMCNC: 33.7 G/DL — SIGNIFICANT CHANGE UP (ref 32–36)
MCV RBC AUTO: 84.4 FL — SIGNIFICANT CHANGE UP (ref 80–100)
PHOSPHATE SERPL-MCNC: 2.9 MG/DL — SIGNIFICANT CHANGE UP (ref 2.4–4.7)
PLATELET # BLD AUTO: 220 K/UL — SIGNIFICANT CHANGE UP (ref 150–400)
POTASSIUM SERPL-MCNC: 4.2 MMOL/L — SIGNIFICANT CHANGE UP (ref 3.5–5.3)
POTASSIUM SERPL-MCNC: 4.3 MMOL/L — SIGNIFICANT CHANGE UP (ref 3.5–5.3)
POTASSIUM SERPL-MCNC: 4.6 MMOL/L — SIGNIFICANT CHANGE UP (ref 3.5–5.3)
POTASSIUM SERPL-SCNC: 4.2 MMOL/L — SIGNIFICANT CHANGE UP (ref 3.5–5.3)
POTASSIUM SERPL-SCNC: 4.3 MMOL/L — SIGNIFICANT CHANGE UP (ref 3.5–5.3)
POTASSIUM SERPL-SCNC: 4.6 MMOL/L — SIGNIFICANT CHANGE UP (ref 3.5–5.3)
RBC # BLD: 3.52 M/UL — LOW (ref 4.2–5.8)
RBC # FLD: 11.9 % — SIGNIFICANT CHANGE UP (ref 10.3–14.5)
SODIUM SERPL-SCNC: 140 MMOL/L — SIGNIFICANT CHANGE UP (ref 135–145)
SODIUM SERPL-SCNC: 141 MMOL/L — SIGNIFICANT CHANGE UP (ref 135–145)
SODIUM SERPL-SCNC: 143 MMOL/L — SIGNIFICANT CHANGE UP (ref 135–145)
T4 FREE SERPL-MCNC: 1.3 NG/DL — SIGNIFICANT CHANGE UP (ref 0.9–1.8)
T4 FREE SERPL-MCNC: 1.4 NG/DL — SIGNIFICANT CHANGE UP (ref 0.9–1.8)
WBC # BLD: 10.11 K/UL — SIGNIFICANT CHANGE UP (ref 3.8–10.5)
WBC # FLD AUTO: 10.11 K/UL — SIGNIFICANT CHANGE UP (ref 3.8–10.5)

## 2024-11-08 PROCEDURE — 99233 SBSQ HOSP IP/OBS HIGH 50: CPT

## 2024-11-08 PROCEDURE — 99232 SBSQ HOSP IP/OBS MODERATE 35: CPT

## 2024-11-08 PROCEDURE — 99232 SBSQ HOSP IP/OBS MODERATE 35: CPT | Mod: GC

## 2024-11-08 RX ORDER — HYDROCORTISONE 10 MG/1
10 TABLET ORAL
Refills: 0 | Status: DISCONTINUED | OUTPATIENT
Start: 2024-11-08 | End: 2024-11-10

## 2024-11-08 RX ORDER — MAGNESIUM, ALUMINUM HYDROXIDE 200-200 MG
30 TABLET,CHEWABLE ORAL EVERY 4 HOURS
Refills: 0 | Status: DISCONTINUED | OUTPATIENT
Start: 2024-11-08 | End: 2024-11-10

## 2024-11-08 RX ORDER — TAMSULOSIN HCL 0.4 MG
0.4 CAPSULE ORAL AT BEDTIME
Refills: 0 | Status: DISCONTINUED | OUTPATIENT
Start: 2024-11-08 | End: 2024-11-10

## 2024-11-08 RX ORDER — LABETALOL HCL 200 MG
10 TABLET ORAL EVERY 4 HOURS
Refills: 0 | Status: DISCONTINUED | OUTPATIENT
Start: 2024-11-08 | End: 2024-11-10

## 2024-11-08 RX ORDER — POLYETHYLENE GLYCOL 3350 17 G/17G
17 POWDER, FOR SOLUTION ORAL
Refills: 0 | Status: DISCONTINUED | OUTPATIENT
Start: 2024-11-08 | End: 2024-11-10

## 2024-11-08 RX ORDER — HYDRALAZINE HYDROCHLORIDE 50 MG/1
10 TABLET, FILM COATED ORAL EVERY 4 HOURS
Refills: 0 | Status: DISCONTINUED | OUTPATIENT
Start: 2024-11-08 | End: 2024-11-10

## 2024-11-08 RX ORDER — HYDROCORTISONE 10 MG/1
5 TABLET ORAL
Refills: 0 | Status: DISCONTINUED | OUTPATIENT
Start: 2024-11-08 | End: 2024-11-10

## 2024-11-08 RX ADMIN — Medication 0.4 MILLIGRAM(S): at 21:56

## 2024-11-08 RX ADMIN — PANTOPRAZOLE SODIUM 40 MILLIGRAM(S): 40 TABLET, DELAYED RELEASE ORAL at 05:25

## 2024-11-08 RX ADMIN — Medication 650 MILLIGRAM(S): at 06:42

## 2024-11-08 RX ADMIN — Medication 1 SPRAY(S): at 10:00

## 2024-11-08 RX ADMIN — Medication 1 SPRAY(S): at 05:26

## 2024-11-08 RX ADMIN — Medication 1 SPRAY(S): at 02:00

## 2024-11-08 RX ADMIN — Medication 6: at 17:20

## 2024-11-08 RX ADMIN — Medication 10 UNIT(S): at 17:20

## 2024-11-08 RX ADMIN — Medication 50 MILLIGRAM(S): at 05:25

## 2024-11-08 RX ADMIN — Medication 650 MILLIGRAM(S): at 05:24

## 2024-11-08 RX ADMIN — HYDROCORTISONE 10 MILLIGRAM(S): 10 TABLET ORAL at 13:45

## 2024-11-08 RX ADMIN — POLYETHYLENE GLYCOL 3350 17 GRAM(S): 17 POWDER, FOR SOLUTION ORAL at 17:13

## 2024-11-08 RX ADMIN — Medication 2: at 12:22

## 2024-11-08 RX ADMIN — Medication 12 UNIT(S): at 21:59

## 2024-11-08 RX ADMIN — Medication 1 SPRAY(S): at 17:13

## 2024-11-08 RX ADMIN — Medication 1 SPRAY(S): at 14:31

## 2024-11-08 RX ADMIN — Medication 2 TABLET(S): at 21:56

## 2024-11-08 RX ADMIN — Medication 50 MILLIGRAM(S): at 17:19

## 2024-11-08 RX ADMIN — Medication 40 MILLIGRAM(S): at 21:56

## 2024-11-08 RX ADMIN — CHLORHEXIDINE GLUCONATE 1 APPLICATION(S): 40 SOLUTION TOPICAL at 05:28

## 2024-11-08 RX ADMIN — Medication 60 MILLIGRAM(S): at 05:24

## 2024-11-08 RX ADMIN — Medication 1 SPRAY(S): at 21:56

## 2024-11-08 RX ADMIN — Medication 30 MILLILITER(S): at 17:12

## 2024-11-08 RX ADMIN — Medication 20 MILLIGRAM(S): at 21:56

## 2024-11-08 RX ADMIN — Medication 10 UNIT(S): at 09:18

## 2024-11-08 RX ADMIN — Medication 5 MILLIGRAM(S): at 11:22

## 2024-11-08 RX ADMIN — Medication 10 UNIT(S): at 12:22

## 2024-11-08 RX ADMIN — HYDROCORTISONE 5 MILLIGRAM(S): 10 TABLET ORAL at 17:18

## 2024-11-08 NOTE — PROGRESS NOTE ADULT - PROBLEM SELECTOR PLAN 1
Na 135 this AM   Skullbase precautions (no nasal swabs, Sneeze with mouth open and pinching nares, avoid bending with head blow the waist, no heavy lifting)  Monitor for signs of CSF leak   Initiate saline nasal sprays - keep at bedside for patient to use throughout the day   ENT to continue to follow
-OR today for enodanasal transphenoidal resx of pituitary lesion with Dr. Baeza and Dr. Harmon  ENT consent in the chart  Cont NPO  Coags WNL  Active T&S
- Na 143 this AM   - Skullbase precautions (no nasal swabs, Sneeze with mouth open and pinching nares, avoid bending with head blow the waist, no heavy lifting)  - Monitor for signs of CSF leak   - Continue saline nasal sprays - keep at bedside for patient to use throughout the day   - ENT to continue to follow

## 2024-11-08 NOTE — PROGRESS NOTE ADULT - SUBJECTIVE AND OBJECTIVE BOX
SAI HENDRIX    [id number]    63y      Male    Patient is a 63y old  Male who presents with a chief complaint of Pituitary Tumor (08 Nov 2024 11:50)      INTERVAL HPI/OVERNIGHT EVENTS:    Patient is s/p Pituitary Tumor resection, he has some nasal pain, denies headache, dizziness, fever, chills, chest pain.       Vital Signs Last 24 Hrs  T(C): 37.2 (08 Nov 2024 16:53), Max: 37.3 (08 Nov 2024 04:00)  T(F): 99 (08 Nov 2024 16:53), Max: 99.1 (08 Nov 2024 04:00)  HR: 84 (08 Nov 2024 16:53) (60 - 84)  BP: 134/86 (08 Nov 2024 16:53) (120/79 - 151/85)  BP(mean): 104 (08 Nov 2024 08:01) (89 - 104)  RR: 18 (08 Nov 2024 16:53) (12 - 18)  SpO2: 98% (08 Nov 2024 16:53) (95% - 98%)    Parameters below as of 08 Nov 2024 16:53  Patient On (Oxygen Delivery Method): room air        PHYSICAL EXAM:    GENERAL: Middle age male looking comfortable   HEENT: nasal dressings on   NECK: soft, Supple, No JVD  CHEST/LUNG: Clear to auscultate bilaterally; No wheezing  HEART: S1S2+, Regular rate and rhythm; No murmurs  ABDOMEN: Soft, Nontender, Nondistended; Bowel sounds present  EXTREMITIES:  1+ Peripheral Pulses, No edema  SKIN: No rashes or lesions  NEURO: AAOX3  PSYCH: normal mood      LABS:                        10.0   10.11 )-----------( 220      ( 08 Nov 2024 06:39 )             29.7     11-08    141  |  103  |  16.2  ----------------------------<  159[H]  4.2   |  25.0  |  1.03    Ca    9.1      08 Nov 2024 14:00  Phos  2.9     11-08  Mg     2.2     11-08      I&O's Summary    07 Nov 2024 07:01  -  08 Nov 2024 07:00  --------------------------------------------------------  IN: 1325 mL / OUT: 2250 mL / NET: -925 mL    08 Nov 2024 07:01  -  08 Nov 2024 20:57  --------------------------------------------------------  IN: 480 mL / OUT: 850 mL / NET: -370 mL        MEDICATIONS  (STANDING):  atorvastatin 20 milliGRAM(s) Oral at bedtime  bisacodyl 5 milliGRAM(s) Oral daily  chlorhexidine 2% Cloths 1 Application(s) Topical <User Schedule>  dextrose 50% Injectable 25 Gram(s) IV Push once  dextrose 50% Injectable 12.5 Gram(s) IV Push once  dextrose 50% Injectable 25 Gram(s) IV Push once  enoxaparin Injectable 40 milliGRAM(s) SubCutaneous <User Schedule>  hydrocortisone 10 milliGRAM(s) Oral <User Schedule>  hydrocortisone 5 milliGRAM(s) Oral <User Schedule>  influenza   Vaccine 0.5 milliLiter(s) IntraMuscular once  insulin glargine Injectable (LANTUS) 12 Unit(s) SubCutaneous at bedtime  insulin lispro (ADMELOG) corrective regimen sliding scale   SubCutaneous Before meals and at bedtime  insulin lispro Injectable (ADMELOG) 10 Unit(s) SubCutaneous three times a day before meals  metoprolol succinate ER 50 milliGRAM(s) Oral two times a day  NIFEdipine XL 60 milliGRAM(s) Oral daily  pantoprazole    Tablet 40 milliGRAM(s) Oral before breakfast  polyethylene glycol 3350 17 Gram(s) Oral two times a day  senna 2 Tablet(s) Oral at bedtime  sodium chloride 0.65% Nasal 1 Spray(s) Both Nostrils every 4 hours  tamsulosin 0.4 milliGRAM(s) Oral at bedtime    MEDICATIONS  (PRN):  acetaminophen     Tablet .. 650 milliGRAM(s) Oral every 6 hours PRN Mild Pain (1 - 3)  aluminum hydroxide/magnesium hydroxide/simethicone Suspension 30 milliLiter(s) Oral every 4 hours PRN Dyspepsia  hydrALAZINE Injectable 10 milliGRAM(s) IV Push every 4 hours PRN SBP > 160mm Hg  labetalol Injectable 10 milliGRAM(s) IV Push every 4 hours PRN Systolic blood pressure >160  ondansetron Injectable 4 milliGRAM(s) IV Push every 6 hours PRN Nausea and/or Vomiting  oxyCODONE    IR 5 milliGRAM(s) Oral every 4 hours PRN Moderate Pain (4 - 6)

## 2024-11-08 NOTE — PROGRESS NOTE ADULT - ASSESSMENT
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   - POD 2  - Exam stable  - Na 143, repeat this afternoon, UOP stable        Plan:  - Discussed with Dr. Harmon   - Q4 neuro checks,   - Strict I&O q4 hours, DI watch   - SBP goal 100-160  - DVT prophyaxis: SCDs, lovenox   - Endocrinology following, am cortisol and free thyroxine pending today  - Transphenoidal precautions: NO nose blowing, CPAP/BiPAP, nasal cannula, nasal swabs, NGT, straws or incentive spirometry; avoid straining  - Monitor for any signs of CSF leak  - Na 143 today, repeat this afternoon   - Diet: DASH, CCD  - Last BM 11/3, bowel regimen: senna, miralax increased to BID, added PO dulcolax, dulcolax suppository this evening if no BM   - [place] nasal spray, ENT following, recs appreciated  - Continue home HTN/HLD meds  - Pain control PRN, avoid oversedation  - Dispo: PT/OT recommended outpatient PT and balance therapy, possible d/c today 11/8 or tomorrow 11/9 pending endocrinology workup

## 2024-11-08 NOTE — PROGRESS NOTE ADULT - SUBJECTIVE AND OBJECTIVE BOX
Patient is a 63y old  Male who presents with a chief complaint of Pituitary Tumor (2024 07:45)    HPI:  63M with PMH of DM, HTN, HLD, GERD, on ASA81 daily, known history of pituitary tumor diagnosed 8 years ago in [facility] with no follow up since then, presents with generalized weakness x 1 day. Patient reports difficulty getting out of his chair with unsteady gait since this morning. Patient currently c/o mild headache and burning with urination. Denies any recent falls/trauma, vision changes, dizziness, nausea/vomiting, numbness, or tingling. 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 Pt also noted to have TWI on EKG, cards consulted and further w/u pending. Pt reportedly claims to be compliant on home cardiac medications. Pt admitted to neurosurgery for likely surgical resection of pituitary tumor with Dr. Harmon. (28 Oct 2024 16:19)      Interval history:  Patient seen and examined by neurosurgery team. Patient POD2 from [facility] for pituitary mass resection. Patient reports that he feels well, admits to mild pain but manageable. He also admits to possible mild nose leakage but no salty taste in his mouth/throat. Post op MRI performed yesterday, results below. Downgraded from ICU to stepdown. Na 143, up from 135 yesterday am, UOP has been stable. Pending am cortisol and free thyroxine per endocrinology. No other acute events reported.       Vital Signs Last 24 Hrs  T(C): 36.9 (2024 08:00), Max: 37.3 (2024 04:00)  T(F): 98.5 (2024 08:00), Max: 99.1 (2024 04:00)  HR: 67 (2024 08:01) (60 - 83)  BP: 137/88 (2024 08:01) (103/60 - 138/83)  BP(mean): 104 (2024 08:01) (73 - 104)  RR: 12 (2024 08:01) (12 - 24)  SpO2: 97% (2024 08:01) (95% - 100%)    Parameters below as of 2024 08:01  Patient On (Oxygen Delivery Method): room air      Physical Exam:  Constitutional: NAD, lying in bed  Neuro  * Mental Status:  GCS 15: Awake, alert, oriented to conversation. No aphasia or difficulty speaking. No dysarthria.   * Cranial Nerves: PERRL, EOMI, tongue midline, no gaze deviation, R eye mild peripheral vision loss  * Motor: RUE 5/5, LUE 5/5, RLE 5/5, LLE 5/5, no drift  * Sensory: Sensation intact to light touch  * Reflexes: not assessed   Cardiovascular: Regular rate and rhythm.  Eyes: See neurologic examination with detailed examination of eyes.  ENT: mustache dressing in place with mild blood / bloody fluid L>R  Respiratory: non labored breathing   Gastrointestinal: mildly full, nontender  Genitourinary: [ ] He, [ x ] No He.   Musculoskeletal: No muscle wasting noted, (See neurologic assessment for full muscle strength assessment)   Skin:  no wounds, no redness, no abrasions noted  Hematologic / Lymph / Immunologic: No bleeding from IV sites or wounds      LABS:                        10.0   10.11 )-----------( 220      ( 2024 06:39 )             29.7     11-08    143  |  107  |  14.6  ----------------------------<  116[H]  4.6   |  25.0  |  0.89    Ca    8.6      2024 06:39  Phos  2.9     11-08  Mg     2.2     -08      I&O:   I&O's Detail    2024 07:01  -  2024 07:00  --------------------------------------------------------  IN:    Oral Fluid: 1325 mL  Total IN: 1325 mL    OUT:    Indwelling Catheter - Urethral (mL): 775 mL    Voided (mL): 1475 mL  Total OUT: 2250 mL    Total NET: -925 mL      Medications:  MEDICATIONS  (STANDING):  atorvastatin 20 milliGRAM(s) Oral at bedtime  bisacodyl 5 milliGRAM(s) Oral daily  bisacodyl Suppository 10 milliGRAM(s) Rectal once  chlorhexidine 2% Cloths 1 Application(s) Topical <User Schedule>  dextrose 50% Injectable 25 Gram(s) IV Push once  dextrose 50% Injectable 12.5 Gram(s) IV Push once  dextrose 50% Injectable 25 Gram(s) IV Push once  enoxaparin Injectable 40 milliGRAM(s) SubCutaneous <User Schedule>  influenza   Vaccine 0.5 milliLiter(s) IntraMuscular once  insulin glargine Injectable (LANTUS) 12 Unit(s) SubCutaneous at bedtime  insulin lispro (ADMELOG) corrective regimen sliding scale   SubCutaneous Before meals and at bedtime  insulin lispro Injectable (ADMELOG) 10 Unit(s) SubCutaneous three times a day before meals  metoprolol succinate ER 50 milliGRAM(s) Oral two times a day  NIFEdipine XL 60 milliGRAM(s) Oral daily  pantoprazole    Tablet 40 milliGRAM(s) Oral before breakfast  polyethylene glycol 3350 17 Gram(s) Oral two times a day  senna 2 Tablet(s) Oral at bedtime  sodium chloride 0.65% Nasal 1 Spray(s) Both Nostrils every 4 hours    MEDICATIONS  (PRN):  acetaminophen     Tablet .. 650 milliGRAM(s) Oral every 6 hours PRN Mild Pain (1 - 3)  hydrALAZINE Injectable 10 milliGRAM(s) IV Push every 4 hours PRN SBP > 160mm Hg  labetalol Injectable 10 milliGRAM(s) IV Push every 4 hours PRN Systolic blood pressure >160  ondansetron Injectable 4 milliGRAM(s) IV Push every 6 hours PRN Nausea and/or Vomiting  oxyCODONE    IR 5 milliGRAM(s) Oral every 4 hours PRN Moderate Pain (4 - 6)      RADIOLOGY & ADDITIONAL STUDIES:  < from: MR Sella alone w/wo IV Cont (24 @ 15:27) >  IMPRESSION:    1.  Postsurgical changes with excellent decompression of the optic chiasm.  2.  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    Patient Name: SAI HENDRIX  MRN: [id number], : 60    --- End of Report ---    ASHLEY LOWRY MD; Attending Radiologist  This document has been electronically signed. 2024  1:18AM    < end of copied text >

## 2024-11-08 NOTE — ED CDU PROVIDER DISPOSITION NOTE - CADM POA URETHRAL CATHETER
Assessment & Plan     (J18.9) Pneumonia of right lower lobe due to infectious organism  (primary encounter diagnosis)  (R91.1) Pulmonary nodule  (J86.9) Empyema (H)  Comment: He presented for follow-up for hospitalization.  He was discharged 3 days ago.  He had a right lower lobe pneumonia with an empyema.  Is also noted that he had a right upper lobe pulmonary nodule.  He had a chest tube placed.  The pleural fluid was yellow, turbid, with a cell count of 105,560, entirely neutrophils, LDH was greater than 2500, glucose was less than 2.  Fluid was completely removed by the chest tube.  No persisting pneumothorax after removal.  Plan: We repeated his CBC, his white count is 27,000 with 88% neutrophils and 8% lymphocytes hemoglobin was 11.8 and platelet count was 403.  He should continue with his Augmentin.  He should follow-up as scheduled with infectious disease and pulmonary medicine.  He also should follow-up with the invasive pulmonologist for biopsy of the right upper lobe lesion.           (N18.31) Chronic kidney disease, stage 3a (H)  Comment: Noted.  His creatinine runs approximately 1.2-1.3.  GFR runs 55-62.  Plan: Basic metabolic panel  (Ca, Cl, CO2, Creat,         Gluc, K, Na, BUN)        Discussed maintaining hydration.  Blood pressure is slightly elevated today.  I think this is a stress response.  He follows his blood pressures at home, he states that his blood pressures at home typically run under 120/80    (D47.1) Chronic myeloproliferative disorder (H)  Comment: Noted.  Chronically elevated white blood cell count.  On hydroxyurea.  Followed by hematology  Plan: CBC with platelets and differential        Continue hydroxyurea        (Z86.711) History of pulmonary embolism  Comment: Noted.  Multiple VTE episodes, on lifelong Eliquis  Plan: Continue Eliquis          MED REC REQUIRED  Post Medication Reconciliation Status:         Keny Amaya is a 75 year old, presenting for the following  health issues:  Hospital F/U        11/8/2024     8:46 AM   Additional Questions   Roomed by Mirna ZHOU   Accompanied by Self     HPI   75-year-old male presents for hospital follow-up.  He was at the [facility] with a right lower lobe pneumonia and a multilobe loculated right-sided pleural effusion that was felt to be an empyema.  He was also noted to have a right upper lobe lung mass that is concerning for malignancy.  He has a history of myeloproliferative disorder.  He has a history of VTE and pulmonary embolus.  He is on hydroxyurea and Eliquis respectively.  He was discharged on Augmentin.  He has follow-up appointments with infectious disease, pulmonary medicine and invasive pulmonary medicine for right upper lobe nodule biopsy.  He is doing well.  He is not having any shortness of breath.  He is not running any temperatures.  His oxygen saturation is 99% on room air.    Hospital Follow-up Visit:    Hospital/Nursing Home/IP Rehab Facility: [facility]  Date of Admission: 10/28/2024  Date of Discharge: 11/5/2024  Reason(s) for Admission: # Right lower lobe pneumonia  # Multilobe loculated right side pleural effusion, empyema  # Lung mass with concern for primary lung malignancy  # Hx of myeloproliferative disorder  # Hx of LLE and BL PE (2017)  Was the patient in the ICU or did the patient experience delirium during hospitalization?  No  Do you have any other stressors you would like to discuss with your provider? No    Problems taking medications regularly:  None  Medication changes since discharge: None  Problems adhering to non-medication therapy:  None    Summary of hospitalization:  [facility] discharge summary reviewed  Diagnostic Tests/Treatments reviewed.  Follow up needed:   Discharging doctor recommended CBC and BMP.  He has an appointment with infectious disease, pulmonary medicine, and invasive pulmonary medicine for pulmonary  "nodule biopsy.  He has a CT scan scheduled for 11/22.  Other Healthcare Providers Involved in Patient s Care:         Specialist appointment - as above and Imaging as above  Update since discharge: improved.         Plan of care communicated with patient and wife.  I specifically recommended keeping their appointment with infectious disease, pulmonary medicine, and most importantly the interventional pulmonologist for pulmonary nodule biopsy               Review of Systems  Constitutional, HEENT, cardiovascular, pulmonary, gi and gu systems are negative, except as otherwise noted.      Objective    BP (!) 186/83   Pulse 75   Temp 97  F (36.1  C) (Tympanic)   Resp 16   Ht 1.778 m (5' 10\")   Wt 65.8 kg (145 lb)   SpO2 99%   BMI 20.81 kg/m    Body mass index is 20.81 kg/m .  Physical Exam   GENERAL: alert and no distress  EYES: Eyes grossly normal to inspection, PERRL and conjunctivae and sclerae normal  HENT: ear canals and TM's normal, nose and mouth without ulcers or lesions  NECK: no adenopathy, no asymmetry, masses, or scars  RESP: Distant breath sounds.  There are some decreased breath sounds in the right base.  Some suggestion of sparse rales in the right base.  Left side of the lung is clear  CV: regular rate and rhythm, normal S1 S2, no S3 or S4, no murmur, click or rub, no peripheral edema  ABDOMEN: soft, nontender, no hepatosplenomegaly, no masses and bowel sounds normal  NEURO: Normal strength and tone, mentation intact and speech normal  PSYCH: mentation appears normal, affect normal/bright          Signed Electronically by: Jorge Luis MD    " No

## 2024-11-08 NOTE — PROGRESS NOTE ADULT - ATTENDING COMMENTS
Patient seen and examined. Case discussed with Dr. Horton and agree with recommendations outlined above. I have personally reviewed the imaging and labs listed above.    Rehab/Impaired mobility and function -  Patient continues to require hospitalization for the above diagnoses and ongoing active management of comorbid complications that are substantially posing a threat to bodily function, functional ability and quality of life.    RECOMMEND - OOB daily      When medically optimized, based on the patient's diagnosis, current functional status and potential for progress, expect patient to achieve functional goals for DC HOME.       Will sign off at this time. Thank you for allowing me to be part of your patient's care. Please reconsult PMR for additional rehab recommendations or dispo needs if functional status changes. Discussed the specific management and recommendations above with Leigh Ann WARD.
Proceed to OR as planned.

## 2024-11-08 NOTE — PROGRESS NOTE ADULT - NS ATTEND BILL GEN_ALL_CORE
Attending to bill
PA/NP to bill
Attending to bill

## 2024-11-08 NOTE — PROGRESS NOTE ADULT - SUBJECTIVE AND OBJECTIVE BOX
INTERVAL EVENTS:  Follow up pituitary resection. AM cortisol resulted low, 5. Pt denies acute complaints.     MEDICATIONS  (STANDING):  atorvastatin 20 milliGRAM(s) Oral at bedtime  bisacodyl 5 milliGRAM(s) Oral daily  bisacodyl Suppository 10 milliGRAM(s) Rectal once  chlorhexidine 2% Cloths 1 Application(s) Topical <User Schedule>  dextrose 50% Injectable 25 Gram(s) IV Push once  dextrose 50% Injectable 12.5 Gram(s) IV Push once  dextrose 50% Injectable 25 Gram(s) IV Push once  enoxaparin Injectable 40 milliGRAM(s) SubCutaneous <User Schedule>  hydrocortisone 5 milliGRAM(s) Oral <User Schedule>  hydrocortisone 10 milliGRAM(s) Oral <User Schedule>  influenza   Vaccine 0.5 milliLiter(s) IntraMuscular once  insulin glargine Injectable (LANTUS) 12 Unit(s) SubCutaneous at bedtime  insulin lispro (ADMELOG) corrective regimen sliding scale   SubCutaneous Before meals and at bedtime  insulin lispro Injectable (ADMELOG) 10 Unit(s) SubCutaneous three times a day before meals  metoprolol succinate ER 50 milliGRAM(s) Oral two times a day  NIFEdipine XL 60 milliGRAM(s) Oral daily  pantoprazole    Tablet 40 milliGRAM(s) Oral before breakfast  polyethylene glycol 3350 17 Gram(s) Oral two times a day  senna 2 Tablet(s) Oral at bedtime  sodium chloride 0.65% Nasal 1 Spray(s) Both Nostrils every 4 hours    MEDICATIONS  (PRN):  acetaminophen     Tablet .. 650 milliGRAM(s) Oral every 6 hours PRN Mild Pain (1 - 3)  hydrALAZINE Injectable 10 milliGRAM(s) IV Push every 4 hours PRN SBP > 160mm Hg  labetalol Injectable 10 milliGRAM(s) IV Push every 4 hours PRN Systolic blood pressure >160  ondansetron Injectable 4 milliGRAM(s) IV Push every 6 hours PRN Nausea and/or Vomiting  oxyCODONE    IR 5 milliGRAM(s) Oral every 4 hours PRN Moderate Pain (4 - 6)    Allergies  No Known Allergies    Vital Signs Last 24 Hrs  T(C): 36.9 (08 Nov 2024 08:00), Max: 37.3 (08 Nov 2024 04:00)  T(F): 98.5 (08 Nov 2024 08:00), Max: 99.1 (08 Nov 2024 04:00)  HR: 67 (08 Nov 2024 08:01) (60 - 83)  BP: 137/88 (08 Nov 2024 08:01) (103/60 - 138/83)  BP(mean): 104 (08 Nov 2024 08:01) (73 - 104)  RR: 12 (08 Nov 2024 08:01) (12 - 24)  SpO2: 97% (08 Nov 2024 08:01) (95% - 100%)    Parameters below as of 08 Nov 2024 08:01  Patient On (Oxygen Delivery Method): room air    PHYSICAL EXAM:  General: No apparent distress  Respiratory: Lungs clear bilaterally  Cardiac: +S1, S2, no m/r/g  GI: +BS, soft, non tender, non distended  Extremities: No peripheral edema  Neuro: A+O X3    LABS:                        10.0   10.11 )-----------( 220      ( 08 Nov 2024 06:39 )             29.7     11-08    143  |  107  |  14.6  ----------------------------<  116[H]  4.6   |  25.0  |  0.89    Ca    8.6      08 Nov 2024 06:39  Phos  2.9     11-08  Mg     2.2     11-08      Urinalysis Basic - ( 08 Nov 2024 06:39 )    Color: x / Appearance: x / SG: x / pH: x  Gluc: 116 mg/dL / Ketone: x  / Bili: x / Urobili: x   Blood: x / Protein: x / Nitrite: x   Leuk Esterase: x / RBC: x / WBC x   Sq Epi: x / Non Sq Epi: x / Bacteria: x    POCT Blood Glucose.: 130 mg/dL (11-08-24 @ 08:59)  POCT Blood Glucose.: 210 mg/dL (11-07-24 @ 21:15)  POCT Blood Glucose.: 241 mg/dL (11-07-24 @ 16:29)  POCT Blood Glucose.: 241 mg/dL (11-07-24 @ 12:03)    Thyroid Stimulating Hormone, Serum: 0.37 uIU/mL (11-07-24 @ 04:24)  Free Thyroxine, Serum: 1.1 ng/dL (10-30-24 @ 05:00)  Thyroid Stimulating Hormone, Serum: 0.60 uIU/mL (10-29-24 @ 05:12)  Free Thyroxine, Serum: 1.0 ng/dL (10-29-24 @ 05:12)  Thyroid Stimulating Hormone, Serum: 0.38 uIU/mL (10-28-24 @ 00:47)

## 2024-11-08 NOTE — PROGRESS NOTE ADULT - ASSESSMENT
63M with PMH of DM, HTN, HLD, GERD, on ASA81 daily, known history of pituitary tumor diagnosed 8 years ago in [facility] with no follow up since then, presented to [facility] ED 10/24/24 with generalized weakness x 1 day. He was admitted for 2.7 x 2.7 x 3.9 cm pituitary mass with chiasmatic encroachment and is now s/p endoscopic transphenoidal or transnasal resection of pituitary tumor 11/6/2024.

## 2024-11-08 NOTE — PROGRESS NOTE ADULT - ASSESSMENT
63M with PMH of DM, HTN, HLD, GERD, on ASA81 daily, known history of pituitary tumor diagnosed 8 years ago in [facility] with no follow up since then, presents with generalized weakness x 1 day and found to have large pituitary mass extensively   elevates and distorts the optic chiasm and forebrain, invades the sphenoid sinus to the right of midline and likely invades the right cavernous sinus. S/p transphenoidal pituitary resection on 11/6/24.     1. Pituitary macroadenoma with optic chiasm compression, s/p resection on 11/6/24  - Preop hormonal workup normal --> non functioning pituitary macroadenoma  - Postop AM cortisol is 5 which is low, ACTH normal 44  - Start PO hydrocortisone 10mg in AM/5mg in PM, will probably only need steroids for a short time, ACTH level is normal  - Will arrange outpatient follow up appt with our office to reassess cortisol level  - Monitor for DI, strict I&Os and BMP. Serum NA stable, 143  - TSH normal but unreliable in pituitary surgery, free thyroxine pending    2. T2DM, a1c 7.1%  - Continue lantus 12 units qhs  - Continue admelog 10 units TID  - Correction scale    3. HLD  - Continue statin 63M with PMH of DM, HTN, HLD, GERD, on ASA81 daily, known history of pituitary tumor diagnosed 8 years ago in [place] with no follow up since then, presents with generalized weakness x 1 day and found to have large pituitary mass extensively   elevates and distorts the optic chiasm and forebrain, invades the sphenoid sinus to the right of midline and likely invades the right cavernous sinus. S/p transphenoidal pituitary resection on 11/6/24.     1. Pituitary macroadenoma with optic chiasm compression, s/p resection on 11/6/24  - Preop hormonal workup normal --> non functioning pituitary macroadenoma  - Postop AM cortisol is 5 which is low, ACTH normal 44  - Start PO hydrocortisone 10mg in AM/5mg in PM, will probably only need steroids for a short time, ACTH level is normal  - Will arrange outpatient follow up appt with our office to reassess cortisol level  - Monitor for DI, strict I&Os and BMP. Serum NA stable, 143  - TSH normal but unreliable in pituitary surgery, free thyroxine pending  - Follow up appt: 1/6 @ 9AM with Dr Montanez ([address] [facility])    2. T2DM, a1c 7.1%  - Continue lantus 12 units qhs  - Continue admelog 10 units TID  - Correction scale    3. HLD  - Continue statin 63M with PMH of DM, HTN, HLD, GERD, on ASA81 daily, known history of pituitary tumor diagnosed 8 years ago in [place] with no follow up since then, presents with generalized weakness x 1 day and found to have large pituitary mass extensively   elevates and distorts the optic chiasm and forebrain, invades the sphenoid sinus to the right of midline and likely invades the right cavernous sinus. S/p transphenoidal pituitary resection on 11/6/24.     1. Pituitary macroadenoma with optic chiasm compression, s/p resection on 11/6/24  - Preop hormonal workup normal --> non functioning pituitary macroadenoma  - Postop AM cortisol is 5 which is low, ACTH normal 44  - Start PO hydrocortisone 10mg in AM/5mg in PM( before 4 pm), will probably only need steroids for a short time, ACTH level is normal  - Will arrange outpatient follow up appt with our office to reassess cortisol level  - Monitor for DI, strict I&Os and BMP. Serum NA stable, 143  - TSH normal but unreliable in pituitary surgery, free thyroxine pending  - Follow up appt: 1/6 @ 9AM with Dr Montanez ([address] [facility])    2. T2DM, a1c 7.1%  - Continue lantus 12 units qhs  - Continue admelog 10 units TID  - Correction scale    3. HLD  - Continue statin

## 2024-11-08 NOTE — PROGRESS NOTE ADULT - SUBJECTIVE AND OBJECTIVE BOX
CC: Patient being seen for rehabilitation follow up.      FUNCTIONAL PROGRESS  PT 11/7  Bed Mobility: Rolling/Turning:     · Physical Assist/Nonphysical Assist	1 person assist    Bed Mobility: Supine to Sit:     · Level of [place]	contact guard  · Physical Assist/Nonphysical Assist	1 person assist    Bed Mobility Analysis:     · Impairments Contributing to Impaired Bed Mobility	decreased strength; impaired balance    Transfer: Sit to Stand:     · Level of [place]	contact guard  · Physical Assist/Nonphysical Assist	1 person assist  · Weight-Bearing Restrictions	weight-bearing as tolerated    Transfer: Stand to Sit:     · Level of [place]	minimum assist (75% patients effort)  · Physical Assist/Nonphysical Assist	1 person assist  · Weight-Bearing Restrictions	weight-bearing as tolerated    Sit/Stand Transfer Safety Analysis:     · Transfer Safety Concerns Noted	decreased weight-shifting ability  · Impairments Contributing to Impaired Transfers	impaired balance; decreased strength; impaired postural control    Gait Skills:     · Level of [place]	pt initially ambulated ~ 20 feet without assist device, slow saranya, wide TERRANCE and small step length. Performance improves with RW however, contact guard still needed 2*2 decreased safety awareness and forward flexed posture.  · Physical Assist/Nonphysical Assist	1 person assist  · Weight-Bearing Restrictions	weight-bearing as tolerated  · Assistive Device	with/without RW  · Gait Distance	150 feet; + 20 feet    Gait Analysis:     · Gait Pattern Used	2-point gait  · Gait Deviations Noted	decreased saranya; decreased weight-shifting ability  · Impairments Contributing to Gait Deviations	impaired balance; impaired postural control    Stair Negotiation:     · Level of [place]	minimum assist (75% patients effort)  · Physical Assist/Nonphysical Assist	1 person assist  · Weight-Bearing Restrictions	weight-bearing as tolerated  · Assistive Device	right rail up  · Stair Pattern	step to step  · Number of Stairs	1    Balance Skills Assessment:     · Sitting Balance: Static	good balance  · Sitting Balance: Dynamic	good balance  · Sit-to-Stand Balance	good minus  · Standing Balance: Static	good minus  · Standing Balance: Dynamic	fair plus  · Systems Impairment Contributing to Balance Disturbance	neuromuscular  · Identified Impairments Contributing to Balance Disturbance	decreased strength; impaired postural control    OT 11/7  Grossly Intact:   · Grossly Intact	BUE grossly WFL      Bathing Training:     · Level of [place]	contact guard    Upper Body Dressing Training:     · Level of [place]	contact guard    Lower Body Dressing Training:     · Level of [place]	contact guard    Toilet Hygiene Training:     · Level of [place]	contact guard    Grooming Training:     · Level of [place]	contact guard    Eating/Self-Feeding Training:     · Level of [place]	Did not directly observe    Clinical Impression:   · Rehab Potential	good, to achieve stated therapy goals  · Therapy Frequency	daily  · Occupational Therapy DME Recommendations	rolling walker; bathing  · Bathing Equipment	shower chair  · Occupational Therapy Discharge Recommendations	Home with assist and out pt balance therapy      VITALS  T(C): 37.3 (11-08-24 @ 04:00), Max: 37.3 (11-08-24 @ 04:00)  HR: 60 (11-08-24 @ 06:00) (60 - 92)  BP: 129/91 (11-08-24 @ 06:00) (103/60 - 138/83)  RR: 13 (11-08-24 @ 06:00) (12 - 24)  SpO2: 96% (11-08-24 @ 06:00) (94% - 100%)  Wt(kg): --    MEDICATIONS   acetaminophen     Tablet .. 650 milliGRAM(s) every 6 hours PRN  atorvastatin 20 milliGRAM(s) at bedtime  chlorhexidine 2% Cloths 1 Application(s) <User Schedule>  dextrose 50% Injectable 25 Gram(s) once  dextrose 50% Injectable 12.5 Gram(s) once  dextrose 50% Injectable 25 Gram(s) once  enoxaparin Injectable 40 milliGRAM(s) <User Schedule>  hydrALAZINE Injectable 10 milliGRAM(s) every 2 hours PRN  HYDROmorphone  Injectable 0.5 milliGRAM(s) every 6 hours PRN  influenza   Vaccine 0.5 milliLiter(s) once  insulin glargine Injectable (LANTUS) 12 Unit(s) at bedtime  insulin lispro (ADMELOG) corrective regimen sliding scale   Before meals and at bedtime  insulin lispro Injectable (ADMELOG) 10 Unit(s) three times a day before meals  labetalol Injectable 10 milliGRAM(s) every 2 hours PRN  metoprolol succinate ER 50 milliGRAM(s) two times a day  NIFEdipine XL 60 milliGRAM(s) daily  ondansetron Injectable 4 milliGRAM(s) every 6 hours PRN  oxyCODONE    IR 5 milliGRAM(s) every 4 hours PRN  oxyCODONE    IR 10 milliGRAM(s) every 4 hours PRN  pantoprazole    Tablet 40 milliGRAM(s) before breakfast  polyethylene glycol 3350 17 Gram(s) daily  senna 2 Tablet(s) at bedtime  sodium chloride 0.65% Nasal 1 Spray(s) every 4 hours      RECENT LABS/IMAGING  - Reviewed Today                        10.0   10.11 )-----------( 220      ( 08 Nov 2024 06:39 )             29.7     11-08    143  |  107  |  14.6  ----------------------------<  116[H]  4.6   |  25.0  |  0.89    Ca    8.6      08 Nov 2024 06:39  Phos  2.9     11-08  Mg     2.2     11-08        Urinalysis Basic - ( 08 Nov 2024 06:39 )    Color: x / Appearance: x / SG: x / pH: x  Gluc: 116 mg/dL / Ketone: x  / Bili: x / Urobili: x   Blood: x / Protein: x / Nitrite: x   Leuk Esterase: x / RBC: x / WBC x   Sq Epi: x / Non Sq Epi: x / Bacteria: x      ----------------------------------------------------------------------------------------  PHYSICAL EXAM  Constitutional - NAD, Comfortable  HEENT - NCAT, EOMI, small amount of dried blood in R nares  Neck - Supple, No limited ROM  Chest - Breathing comfortably, No wheezing  Cardiovascular - S1S2   Abdomen - Soft   Extremities - No C/C/E, No calf tenderness   Neurologic Exam -                    Cognitive - AAO to self, place, date, year, situation     Communication - Fluent, No dysarthria     Cranial Nerves - EOMI, PERRLA, Confrontation intact, Facial sensation intact, Facial Motor intact, Hearing intact b/l, Shoulder shurg intact b/l, Hypoglossal intact     FUNCTIONAL MOTOR EXAM - No focal deficits, Some difficulty following commands but seemed to be more of translation issue                    LEFT    UE - ShAB 5/5, EF 5/5, EE 5/5, WE 5/5,  5/5                    RIGHT UE - ShAB 5/5, EF 5/5, EE 5/5, WE 5/5,  5/5                    LEFT    LE - HF 5/5, KE 5/5, DF 5/5, PF 5/5                    RIGHT LE - HF 5/5, KE 5/5, DF 5/5, PF 5/5        Sensory - Intact to LT     Reflexes - DTR Intact, No primitive reflexive     Coordination - FTN intact     OculoVestibular - No saccades, No nystagmus, VOR         Balance - WNL Static  Psychiatric - Mood stable, Affect WNL  ----------------------------------------------------------------------------------------  ASSESSMENT/PLAN    63M with PMH of DM, HTN, HLD, GERD, on ASA81 daily, known history of pituitary tumor diagnosed 8 years ago in [facility] with no follow up since then admitted on 10-28. Now s/p endoscopic endonasal transphenoidal resection of pituitary tumor. Patient today with no new neurologic or functional deficits.     #Pituitary tumor, s/p resection  -No new functional deficits noted. patient has good support at home   -Unclear if patient was having dysphagia from my discussion with him. Would get proper SLP eval.  SLP needs alone would not warrant discharge to rehab unit or [facility] [place]. ENT already following  -No further PT/OT needs  -MRI pending today  -SLP eval still pending    #Other comorbidities  - Continue medical management with primary team     Pain - Tylenol  DVT PPX - SCDs         CC: Patient being seen for rehabilitation follow up.    Patient seen and examined at bedside this morning. He had no new complains. He had several questions for the surgical team, team was informed to come speak with patient.       FUNCTIONAL PROGRESS  PT 11/7  Bed Mobility: Rolling/Turning:     · Physical Assist/Nonphysical Assist	1 person assist    Bed Mobility: Supine to Sit:     · Level of [place]	contact guard  · Physical Assist/Nonphysical Assist	1 person assist    Bed Mobility Analysis:     · Impairments Contributing to Impaired Bed Mobility	decreased strength; impaired balance    Transfer: Sit to Stand:     · Level of [place]	contact guard  · Physical Assist/Nonphysical Assist	1 person assist  · Weight-Bearing Restrictions	weight-bearing as tolerated    Transfer: Stand to Sit:     · Level of [place]	minimum assist (75% patients effort)  · Physical Assist/Nonphysical Assist	1 person assist  · Weight-Bearing Restrictions	weight-bearing as tolerated    Sit/Stand Transfer Safety Analysis:     · Transfer Safety Concerns Noted	decreased weight-shifting ability  · Impairments Contributing to Impaired Transfers	impaired balance; decreased strength; impaired postural control    Gait Skills:     · Level of [place]	pt initially ambulated ~ 20 feet without assist device, slow saranya, wide TERRANCE and small step length. Performance improves with RW however, contact guard still needed 2*2 decreased safety awareness and forward flexed posture.  · Physical Assist/Nonphysical Assist	1 person assist  · Weight-Bearing Restrictions	weight-bearing as tolerated  · Assistive Device	with/without RW  · Gait Distance	150 feet; + 20 feet    Gait Analysis:     · Gait Pattern Used	2-point gait  · Gait Deviations Noted	decreased saranya; decreased weight-shifting ability  · Impairments Contributing to Gait Deviations	impaired balance; impaired postural control    Stair Negotiation:     · Level of [place]	minimum assist (75% patients effort)  · Physical Assist/Nonphysical Assist	1 person assist  · Weight-Bearing Restrictions	weight-bearing as tolerated  · Assistive Device	right rail up  · Stair Pattern	step to step  · Number of Stairs	1    Balance Skills Assessment:     · Sitting Balance: Static	good balance  · Sitting Balance: Dynamic	good balance  · Sit-to-Stand Balance	good minus  · Standing Balance: Static	good minus  · Standing Balance: Dynamic	fair plus  · Systems Impairment Contributing to Balance Disturbance	neuromuscular  · Identified Impairments Contributing to Balance Disturbance	decreased strength; impaired postural control    OT 11/7  Grossly Intact:   · Grossly Intact	BUE grossly WFL      Bathing Training:     · Level of [place]	contact guard    Upper Body Dressing Training:     · Level of [place]	contact guard    Lower Body Dressing Training:     · Level of [place]	contact guard    Toilet Hygiene Training:     · Level of [place]	contact guard    Grooming Training:     · Level of [place]	contact guard    Eating/Self-Feeding Training:     · Level of [place]	Did not directly observe    Clinical Impression:   · Rehab Potential	good, to achieve stated therapy goals  · Therapy Frequency	daily  · Occupational Therapy DME Recommendations	rolling walker; bathing  · Bathing Equipment	shower chair  · Occupational Therapy Discharge Recommendations	Home with assist and out pt balance therapy      VITALS  T(C): 37.3 (11-08-24 @ 04:00), Max: 37.3 (11-08-24 @ 04:00)  HR: 60 (11-08-24 @ 06:00) (60 - 92)  BP: 129/91 (11-08-24 @ 06:00) (103/60 - 138/83)  RR: 13 (11-08-24 @ 06:00) (12 - 24)  SpO2: 96% (11-08-24 @ 06:00) (94% - 100%)  Wt(kg): --    MEDICATIONS   acetaminophen     Tablet .. 650 milliGRAM(s) every 6 hours PRN  atorvastatin 20 milliGRAM(s) at bedtime  chlorhexidine 2% Cloths 1 Application(s) <User Schedule>  dextrose 50% Injectable 25 Gram(s) once  dextrose 50% Injectable 12.5 Gram(s) once  dextrose 50% Injectable 25 Gram(s) once  enoxaparin Injectable 40 milliGRAM(s) <User Schedule>  hydrALAZINE Injectable 10 milliGRAM(s) every 2 hours PRN  HYDROmorphone  Injectable 0.5 milliGRAM(s) every 6 hours PRN  influenza   Vaccine 0.5 milliLiter(s) once  insulin glargine Injectable (LANTUS) 12 Unit(s) at bedtime  insulin lispro (ADMELOG) corrective regimen sliding scale   Before meals and at bedtime  insulin lispro Injectable (ADMELOG) 10 Unit(s) three times a day before meals  labetalol Injectable 10 milliGRAM(s) every 2 hours PRN  metoprolol succinate ER 50 milliGRAM(s) two times a day  NIFEdipine XL 60 milliGRAM(s) daily  ondansetron Injectable 4 milliGRAM(s) every 6 hours PRN  oxyCODONE    IR 5 milliGRAM(s) every 4 hours PRN  oxyCODONE    IR 10 milliGRAM(s) every 4 hours PRN  pantoprazole    Tablet 40 milliGRAM(s) before breakfast  polyethylene glycol 3350 17 Gram(s) daily  senna 2 Tablet(s) at bedtime  sodium chloride 0.65% Nasal 1 Spray(s) every 4 hours      RECENT LABS/IMAGING  - Reviewed Today                        10.0   10.11 )-----------( 220      ( 08 Nov 2024 06:39 )             29.7     11-08    143  |  107  |  14.6  ----------------------------<  116[H]  4.6   |  25.0  |  0.89    Ca    8.6      08 Nov 2024 06:39  Phos  2.9     11-08  Mg     2.2     11-08        Urinalysis Basic - ( 08 Nov 2024 06:39 )    Color: x / Appearance: x / SG: x / pH: x  Gluc: 116 mg/dL / Ketone: x  / Bili: x / Urobili: x   Blood: x / Protein: x / Nitrite: x   Leuk Esterase: x / RBC: x / WBC x   Sq Epi: x / Non Sq Epi: x / Bacteria: x      ----------------------------------------------------------------------------------------  PHYSICAL EXAM  Constitutional - NAD, Comfortable  HEENT - NCAT, EOMI, small amount of dried blood in R nares  Neck - Supple, No limited ROM  Chest - Breathing comfortably, No wheezing  Cardiovascular - S1S2   Abdomen - Soft   Extremities - No C/C/E, No calf tenderness   Neurologic Exam -                    Cognitive - AAO to self, place, date, year, situation     Communication - Fluent, No dysarthria     Cranial Nerves - EOMI, PERRLA, Confrontation intact, Facial sensation intact, Facial Motor intact, Hearing intact b/l, Shoulder shurg intact b/l, Hypoglossal intact     FUNCTIONAL MOTOR EXAM - No focal deficits                    LEFT    UE - ShAB 5/5, EF 5/5, EE 5/5, WE 5/5,  5/5                    RIGHT UE - ShAB 5/5, EF 5/5, EE 5/5, WE 5/5,  5/5                    LEFT    LE - HF 5/5, KE 5/5, DF 5/5, PF 5/5                    RIGHT LE - HF 5/5, KE 5/5, DF 5/5, PF 5/5        Sensory - Intact to LT     Reflexes - DTR Intact, No primitive reflexive     Coordination - FTN intact     OculoVestibular - No saccades, No nystagmus, VOR         Balance - WNL Static  Psychiatric - Mood stable, Affect WNL  ----------------------------------------------------------------------------------------  ASSESSMENT/PLAN    63M with PMH of DM, HTN, HLD, GERD, on ASA81 daily, known history of pituitary tumor diagnosed 8 years ago in [facility] with no follow up since then admitted on 10-28. Now s/p endoscopic endonasal transphenoidal resection of pituitary tumor. Patient today with no new neurologic or functional deficits.     #Pituitary tumor, s/p resection  -No new functional deficits noted. patient has good support at home   -Can discharge with home PT/OT services  -MRI brain and celia 11/7 reviewed, some residual tumor but overall excellent decompression    #Other comorbidities  - Continue medical management with primary team     Pain - Tylenol  DVT PPX - SCDs

## 2024-11-08 NOTE — PROGRESS NOTE ADULT - SUBJECTIVE AND OBJECTIVE BOX
ENT ISSUE/POD: POD2 TSR of pituitary adenoma    Subjective/Interval: Patient seen at bedside overall feeling well. Reports vision is unchanged from prior surgery, requesting ophthalmology evaluation. Reports minimal rhinorrhea from nose. Na 143    PAST MEDICAL & SURGICAL HISTORY:  HTN (hypertension)  HLD (hyperlipidemia)  DM (diabetes mellitus)  GERD (gastroesophageal reflux disease)  H/O: pituitary tumor  No significant past surgical history        Allergies    No Known Allergies    Intolerances      MEDICATIONS  (STANDING):  atorvastatin 20 milliGRAM(s) Oral at bedtime  bisacodyl 5 milliGRAM(s) Oral daily  bisacodyl Suppository 10 milliGRAM(s) Rectal once  chlorhexidine 2% Cloths 1 Application(s) Topical <User Schedule>  dextrose 50% Injectable 25 Gram(s) IV Push once  dextrose 50% Injectable 12.5 Gram(s) IV Push once  dextrose 50% Injectable 25 Gram(s) IV Push once  enoxaparin Injectable 40 milliGRAM(s) SubCutaneous <User Schedule>  influenza   Vaccine 0.5 milliLiter(s) IntraMuscular once  insulin glargine Injectable (LANTUS) 12 Unit(s) SubCutaneous at bedtime  insulin lispro (ADMELOG) corrective regimen sliding scale   SubCutaneous Before meals and at bedtime  insulin lispro Injectable (ADMELOG) 10 Unit(s) SubCutaneous three times a day before meals  metoprolol succinate ER 50 milliGRAM(s) Oral two times a day  NIFEdipine XL 60 milliGRAM(s) Oral daily  pantoprazole    Tablet 40 milliGRAM(s) Oral before breakfast  polyethylene glycol 3350 17 Gram(s) Oral two times a day  senna 2 Tablet(s) Oral at bedtime  sodium chloride 0.65% Nasal 1 Spray(s) Both Nostrils every 4 hours    MEDICATIONS  (PRN):  acetaminophen     Tablet .. 650 milliGRAM(s) Oral every 6 hours PRN Mild Pain (1 - 3)  hydrALAZINE Injectable 10 milliGRAM(s) IV Push every 4 hours PRN SBP > 160mm Hg  labetalol Injectable 10 milliGRAM(s) IV Push every 4 hours PRN Systolic blood pressure >160  ondansetron Injectable 4 milliGRAM(s) IV Push every 6 hours PRN Nausea and/or Vomiting  oxyCODONE    IR 5 milliGRAM(s) Oral every 4 hours PRN Moderate Pain (4 - 6)      Social History: see consult note    Family history: see consult note    ROS:   ENT: all negative except as noted in HPI   Pulm: denies SOB, cough, hemoptysis  Neuro: denies numbness/tingling, loss of sensation  Endo: denies heat/cold intolerance, excessive sweating      Vital Signs Last 24 Hrs  T(C): 36.9 (08 Nov 2024 08:00), Max: 37.3 (08 Nov 2024 04:00)  T(F): 98.5 (08 Nov 2024 08:00), Max: 99.1 (08 Nov 2024 04:00)  HR: 67 (08 Nov 2024 08:01) (60 - 83)  BP: 137/88 (08 Nov 2024 08:01) (103/60 - 138/83)  BP(mean): 104 (08 Nov 2024 08:01) (73 - 104)  RR: 12 (08 Nov 2024 08:01) (12 - 24)  SpO2: 97% (08 Nov 2024 08:01) (95% - 100%)    Parameters below as of 08 Nov 2024 08:01  Patient On (Oxygen Delivery Method): room air                              10.0   10.11 )-----------( 220      ( 08 Nov 2024 06:39 )             29.7    11-08    143  |  107  |  14.6  ----------------------------<  116[H]  4.6   |  25.0  |  0.89    Ca    8.6      08 Nov 2024 06:39  Phos  2.9     11-08  Mg     2.2     11-08         PHYSICAL EXAM:  Gen: NAD  Skin: No rashes, bruises, or lesions  Head: Normocephalic, Atraumatic  Face: no edema, erythema, or fluctuance. Parotid glands soft without mass  Eyes: no scleral injection  Ears: external exam unremarkable  Nose: Nares bilaterally patent, scant blood tinged rhinorrhea  Mouth: No Stridor / Drooling / Trismus.  Mucosa moist, tongue/uvula midline, oropharynx clear  Neck: Flat, supple, no lymphadenopathy, trachea midline, no masses  Lymphatic: No lymphadenopathy  Resp: breathing easily, no stridor  Neuro: facial nerve intact, no facial droop

## 2024-11-08 NOTE — PROGRESS NOTE ADULT - ASSESSMENT
63 M with PMH of DM, HTN, HLD, GERD, on ASA81 daily, known history of pituitary tumor diagnosed 8 years ago in [facility] with no follow up since then, presents with generalized weakness x 1 day. Patient reports difficulty getting out of his chair with unsteady gait since this morning. Patient currently c/o mild headache and burning with urination. Denies any recent falls/trauma, vision changes, dizziness, nausea/vomiting, numbness, or tingling. 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 Pt also noted to have TWI on EKG, cards consulted and further w/u pending. Pt reportedly claims to be compliant on home cardiac medications. Pt admitted to neurosurgery for likely surgical resection of pituitary tumor with Dr. Harmon.       # Pituitary tumor - MRI brain reviewed: Large pituitary tumor, likely adenoma, extensively elevates and distorts the optic chiasm and forebrain, invades the sphenoid sinus to the right of midline and likely invades the right cavernous sinus s/p resection on 11/6 Wednesday  - Neuro checks   - Neuro ophtho eval done 10/31 per NSG   - SBP goal <160  - ENT is following as well  -PRN pain meds   -followed by Endocrine as per them  Postop AM cortisol is 5 which is low, ACTH normal 44  - Started on PO hydrocortisone 10mg in AM/5mg in PM( before 4 pm), will probably only need steroids for a short time, ACTH level is normal  - Monitor for DI, strict I&Os and BMP. Serum NA stable, 143  - TSH normal but unreliable in pituitary surgery, free thyroxine pending      # Anemia, normocytic  - Hb is stable   monitor CBC     # Abnormal EKG   - TTE reviewed   - Cardiac CTA done: Minimal non-obstructive CAD   - Cardio done     # DM   A1C 7.2   Uncontrolled  - Blood glucose monitoring with sliding scale Lispro  on pradial insulin 10 unit and Lantus 12 units per Endocrinology recommendations     # HTN  # HLD   - Toprol XL, Losartan, Nifedipine  - statin     DVT ppx - SCDs

## 2024-11-08 NOTE — PROGRESS NOTE ADULT - NS ATTEND AMEND GEN_ALL_CORE FT
63M with PMH of DM, HTN, HLD, GERD admitted with large NFPA with optic chiasm compression. Hormonal work up was normal.  Glucoses mildly elevated, insulin dosing above to optimize glucoses prior to surgery. Remaining plan as above.
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  Glucosed moderately controll. Hormonal evaluation in progress. Agree with above plan.
63M with PMH of DM, HTN, HLD, GERD admitted with large NFPA with optic chiasm compression. Hormonal work up was normal.  Glucoses mildly elevated, insulin dosing above to optimize glucoses prior to surgery. Remaining plan as above.
I have seen and examined patient with NP, agree with above assessment and plan
I have seen and examined patient with NP and agree with above assessment and plan.
I have seen and examined patient with NP, agree with above assessment and plan.
63M with PMH of DM, HTN, HLD, GERD admitted with large NFPA with optic chiasm compression. Hormonal work up was normal.  Glucoses improved, insulin dosing above to optimize glucoses prior to surgery. Remaining plan as above.
I have seen and examined patient with NP, agree with above assessment and plan.

## 2024-11-09 ENCOUNTER — TRANSCRIPTION ENCOUNTER (OUTPATIENT)
Age: 64
End: 2024-11-09

## 2024-11-09 LAB
ANION GAP SERPL CALC-SCNC: 10 MMOL/L — SIGNIFICANT CHANGE UP (ref 5–17)
ANION GAP SERPL CALC-SCNC: 9 MMOL/L — SIGNIFICANT CHANGE UP (ref 5–17)
BUN SERPL-MCNC: 17.2 MG/DL — SIGNIFICANT CHANGE UP (ref 8–20)
BUN SERPL-MCNC: 17.6 MG/DL — SIGNIFICANT CHANGE UP (ref 8–20)
CALCIUM SERPL-MCNC: 8.9 MG/DL — SIGNIFICANT CHANGE UP (ref 8.4–10.5)
CALCIUM SERPL-MCNC: 9.2 MG/DL — SIGNIFICANT CHANGE UP (ref 8.4–10.5)
CHLORIDE SERPL-SCNC: 101 MMOL/L — SIGNIFICANT CHANGE UP (ref 96–108)
CHLORIDE SERPL-SCNC: 105 MMOL/L — SIGNIFICANT CHANGE UP (ref 96–108)
CO2 SERPL-SCNC: 28 MMOL/L — SIGNIFICANT CHANGE UP (ref 22–29)
CO2 SERPL-SCNC: 28 MMOL/L — SIGNIFICANT CHANGE UP (ref 22–29)
CREAT SERPL-MCNC: 1.04 MG/DL — SIGNIFICANT CHANGE UP (ref 0.5–1.3)
CREAT SERPL-MCNC: 1.22 MG/DL — SIGNIFICANT CHANGE UP (ref 0.5–1.3)
EGFR: 67 ML/MIN/1.73M2 — SIGNIFICANT CHANGE UP
EGFR: 81 ML/MIN/1.73M2 — SIGNIFICANT CHANGE UP
GLUCOSE BLDC GLUCOMTR-MCNC: 121 MG/DL — HIGH (ref 70–99)
GLUCOSE BLDC GLUCOMTR-MCNC: 156 MG/DL — HIGH (ref 70–99)
GLUCOSE BLDC GLUCOMTR-MCNC: 184 MG/DL — HIGH (ref 70–99)
GLUCOSE BLDC GLUCOMTR-MCNC: 198 MG/DL — HIGH (ref 70–99)
GLUCOSE BLDC GLUCOMTR-MCNC: 200 MG/DL — HIGH (ref 70–99)
GLUCOSE SERPL-MCNC: 101 MG/DL — HIGH (ref 70–99)
GLUCOSE SERPL-MCNC: 208 MG/DL — HIGH (ref 70–99)
HCT VFR BLD CALC: 27.8 % — LOW (ref 39–50)
HGB BLD-MCNC: 9.2 G/DL — LOW (ref 13–17)
MAGNESIUM SERPL-MCNC: 2.1 MG/DL — SIGNIFICANT CHANGE UP (ref 1.8–2.6)
MCHC RBC-ENTMCNC: 28.2 PG — SIGNIFICANT CHANGE UP (ref 27–34)
MCHC RBC-ENTMCNC: 33.1 G/DL — SIGNIFICANT CHANGE UP (ref 32–36)
MCV RBC AUTO: 85.3 FL — SIGNIFICANT CHANGE UP (ref 80–100)
PHOSPHATE SERPL-MCNC: 2.8 MG/DL — SIGNIFICANT CHANGE UP (ref 2.4–4.7)
PLATELET # BLD AUTO: 208 K/UL — SIGNIFICANT CHANGE UP (ref 150–400)
POTASSIUM SERPL-MCNC: 4.3 MMOL/L — SIGNIFICANT CHANGE UP (ref 3.5–5.3)
POTASSIUM SERPL-MCNC: 4.5 MMOL/L — SIGNIFICANT CHANGE UP (ref 3.5–5.3)
POTASSIUM SERPL-SCNC: 4.3 MMOL/L — SIGNIFICANT CHANGE UP (ref 3.5–5.3)
POTASSIUM SERPL-SCNC: 4.5 MMOL/L — SIGNIFICANT CHANGE UP (ref 3.5–5.3)
RBC # BLD: 3.26 M/UL — LOW (ref 4.2–5.8)
RBC # FLD: 11.8 % — SIGNIFICANT CHANGE UP (ref 10.3–14.5)
SODIUM SERPL-SCNC: 138 MMOL/L — SIGNIFICANT CHANGE UP (ref 135–145)
SODIUM SERPL-SCNC: 143 MMOL/L — SIGNIFICANT CHANGE UP (ref 135–145)
WBC # BLD: 8.72 K/UL — SIGNIFICANT CHANGE UP (ref 3.8–10.5)
WBC # FLD AUTO: 8.72 K/UL — SIGNIFICANT CHANGE UP (ref 3.8–10.5)

## 2024-11-09 PROCEDURE — 99232 SBSQ HOSP IP/OBS MODERATE 35: CPT

## 2024-11-09 RX ORDER — OXYCODONE HYDROCHLORIDE 30 MG/1
10 TABLET ORAL EVERY 4 HOURS
Refills: 0 | Status: DISCONTINUED | OUTPATIENT
Start: 2024-11-09 | End: 2024-11-10

## 2024-11-09 RX ADMIN — Medication 1 SPRAY(S): at 21:34

## 2024-11-09 RX ADMIN — Medication 50 MILLIGRAM(S): at 05:41

## 2024-11-09 RX ADMIN — Medication 30 MILLILITER(S): at 18:36

## 2024-11-09 RX ADMIN — POLYETHYLENE GLYCOL 3350 17 GRAM(S): 17 POWDER, FOR SOLUTION ORAL at 18:01

## 2024-11-09 RX ADMIN — Medication 1 SPRAY(S): at 01:24

## 2024-11-09 RX ADMIN — Medication 2 TABLET(S): at 21:33

## 2024-11-09 RX ADMIN — Medication 12 UNIT(S): at 21:50

## 2024-11-09 RX ADMIN — Medication 50 MILLIGRAM(S): at 18:07

## 2024-11-09 RX ADMIN — Medication 1 SPRAY(S): at 08:29

## 2024-11-09 RX ADMIN — Medication 40 MILLIGRAM(S): at 21:33

## 2024-11-09 RX ADMIN — Medication 1 SPRAY(S): at 05:42

## 2024-11-09 RX ADMIN — Medication 1 SPRAY(S): at 17:59

## 2024-11-09 RX ADMIN — Medication 10 UNIT(S): at 08:29

## 2024-11-09 RX ADMIN — POLYETHYLENE GLYCOL 3350 17 GRAM(S): 17 POWDER, FOR SOLUTION ORAL at 05:42

## 2024-11-09 RX ADMIN — Medication 20 MILLIGRAM(S): at 21:33

## 2024-11-09 RX ADMIN — Medication 0.4 MILLIGRAM(S): at 21:33

## 2024-11-09 RX ADMIN — OXYCODONE HYDROCHLORIDE 10 MILLIGRAM(S): 30 TABLET ORAL at 11:05

## 2024-11-09 RX ADMIN — Medication 60 MILLIGRAM(S): at 05:41

## 2024-11-09 RX ADMIN — Medication 30 MILLILITER(S): at 10:05

## 2024-11-09 RX ADMIN — Medication 10 UNIT(S): at 13:03

## 2024-11-09 RX ADMIN — Medication 5 MILLIGRAM(S): at 13:29

## 2024-11-09 RX ADMIN — PANTOPRAZOLE SODIUM 40 MILLIGRAM(S): 40 TABLET, DELAYED RELEASE ORAL at 05:41

## 2024-11-09 RX ADMIN — CHLORHEXIDINE GLUCONATE 1 APPLICATION(S): 40 SOLUTION TOPICAL at 05:42

## 2024-11-09 RX ADMIN — OXYCODONE HYDROCHLORIDE 10 MILLIGRAM(S): 30 TABLET ORAL at 10:05

## 2024-11-09 RX ADMIN — Medication 10 UNIT(S): at 18:04

## 2024-11-09 RX ADMIN — Medication 2: at 18:04

## 2024-11-09 RX ADMIN — HYDROCORTISONE 10 MILLIGRAM(S): 10 TABLET ORAL at 09:19

## 2024-11-09 RX ADMIN — HYDROCORTISONE 5 MILLIGRAM(S): 10 TABLET ORAL at 18:00

## 2024-11-09 RX ADMIN — Medication 650 MILLIGRAM(S): at 05:47

## 2024-11-09 RX ADMIN — Medication 2: at 13:02

## 2024-11-09 RX ADMIN — Medication 1 SPRAY(S): at 13:29

## 2024-11-09 RX ADMIN — Medication 2: at 00:28

## 2024-11-09 NOTE — DISCHARGE NOTE PROVIDER - CARE PROVIDER_API CALL
Sunday Harmonul  Neurosurgery  [address] [place] [id number]  Phone: [phone]  Fax: [phone]  Follow Up Time: 2 weeks    Bennie Baeza  Otolaryngology  [address] [place] [address], [address]  [place] [address]  Phone: [phone]  Fax: [phone]  Follow Up Time: 2 weeks

## 2024-11-09 NOTE — PROGRESS NOTE ADULT - SUBJECTIVE AND OBJECTIVE BOX
[facility] Division of Hospital Medicine    Chief Complaint:  Pituitary Tumor    SUBJECTIVE / OVERNIGHT EVENTS: Language line  # [id number]. Patient seen and examined. Patient reports he did not sleep well due to severe headache. He also reported chest discomfort that he describes as burning and feeling liquid in his throat. Currently no chest pain and no shortness of breath.      Patient denies chest pain, SOB, abd pain, N/V, fever, chills, dysuria or any other complaints. All remainder ROS negative.     MEDICATIONS  (STANDING):  atorvastatin 20 milliGRAM(s) Oral at bedtime  bisacodyl 5 milliGRAM(s) Oral daily  chlorhexidine 2% Cloths 1 Application(s) Topical <User Schedule>  dextrose 50% Injectable 25 Gram(s) IV Push once  dextrose 50% Injectable 12.5 Gram(s) IV Push once  dextrose 50% Injectable 25 Gram(s) IV Push once  enoxaparin Injectable 40 milliGRAM(s) SubCutaneous <User Schedule>  hydrocortisone 10 milliGRAM(s) Oral <User Schedule>  hydrocortisone 5 milliGRAM(s) Oral <User Schedule>  influenza   Vaccine 0.5 milliLiter(s) IntraMuscular once  insulin glargine Injectable (LANTUS) 12 Unit(s) SubCutaneous at bedtime  insulin lispro (ADMELOG) corrective regimen sliding scale   SubCutaneous Before meals and at bedtime  insulin lispro Injectable (ADMELOG) 10 Unit(s) SubCutaneous three times a day before meals  metoprolol succinate ER 50 milliGRAM(s) Oral two times a day  NIFEdipine XL 60 milliGRAM(s) Oral daily  pantoprazole    Tablet 40 milliGRAM(s) Oral before breakfast  polyethylene glycol 3350 17 Gram(s) Oral two times a day  senna 2 Tablet(s) Oral at bedtime  sodium chloride 0.65% Nasal 1 Spray(s) Both Nostrils every 4 hours  tamsulosin 0.4 milliGRAM(s) Oral at bedtime    MEDICATIONS  (PRN):  acetaminophen     Tablet .. 650 milliGRAM(s) Oral every 6 hours PRN Mild Pain (1 - 3)  aluminum hydroxide/magnesium hydroxide/simethicone Suspension 30 milliLiter(s) Oral every 4 hours PRN Dyspepsia  hydrALAZINE Injectable 10 milliGRAM(s) IV Push every 4 hours PRN SBP > 160mm Hg  labetalol Injectable 10 milliGRAM(s) IV Push every 4 hours PRN Systolic blood pressure >160  ondansetron Injectable 4 milliGRAM(s) IV Push every 6 hours PRN Nausea and/or Vomiting  oxyCODONE    IR 10 milliGRAM(s) Oral every 4 hours PRN Severe Pain (7 - 10)  oxyCODONE    IR 5 milliGRAM(s) Oral every 4 hours PRN Moderate Pain (4 - 6)        I&O's Summary    08 Nov 2024 07:01  -  09 Nov 2024 07:00  --------------------------------------------------------  IN: 830 mL / OUT: 2650 mL / NET: -1820 mL        PHYSICAL EXAM:  Vital Signs Last 24 Hrs  T(C): 36.8 (09 Nov 2024 07:49), Max: 37.2 (08 Nov 2024 16:53)  T(F): 98.3 (09 Nov 2024 07:49), Max: 99 (08 Nov 2024 16:53)  HR: 65 (09 Nov 2024 07:49) (65 - 84)  BP: 119/70 (09 Nov 2024 07:49) (118/69 - 154/82)  BP(mean): --  RR: 18 (09 Nov 2024 07:49) (16 - 18)  SpO2: 94% (09 Nov 2024 07:49) (94% - 98%)    Parameters below as of 09 Nov 2024 07:49  Patient On (Oxygen Delivery Method): room air            CONSTITUTIONAL: NAD  ENMT: Moist oral mucosa, nasal dressing c/d/i  RESPIRATORY: Normal respiratory effort; lungs are clear to auscultation bilaterally  CARDIOVASCULAR: Regular rate and rhythm, normal S1 and S2, No lower extremity edema;  ABDOMEN: Nontender to palpation, normoactive bowel sounds, no rebound/guarding;   MUSCLOSKELETAL:  no joint swelling or tenderness to palpation  PSYCH: A+O to person, place, and time; affect appropriate  NEUROLOGY: CN 2-12 are intact and symmetric; no gross sensory deficits;   SKIN: No rashes; no palpable lesions    LABS:                        9.2    8.72  )-----------( 208      ( 09 Nov 2024 04:47 )             27.8     11-09    143  |  105  |  17.6  ----------------------------<  101[H]  4.5   |  28.0  |  1.04    Ca    8.9      09 Nov 2024 04:47  Phos  2.8     11-09  Mg     2.1     11-09            Urinalysis Basic - ( 09 Nov 2024 04:47 )    Color: x / Appearance: x / SG: x / pH: x  Gluc: 101 mg/dL / Ketone: x  / Bili: x / Urobili: x   Blood: x / Protein: x / Nitrite: x   Leuk Esterase: x / RBC: x / WBC x   Sq Epi: x / Non Sq Epi: x / Bacteria: x        CAPILLARY BLOOD GLUCOSE      POCT Blood Glucose.: 121 mg/dL (09 Nov 2024 08:18)  POCT Blood Glucose.: 156 mg/dL (09 Nov 2024 00:25)  POCT Blood Glucose.: 191 mg/dL (08 Nov 2024 21:58)  POCT Blood Glucose.: 254 mg/dL (08 Nov 2024 17:09)  POCT Blood Glucose.: 193 mg/dL (08 Nov 2024 12:19)        RADIOLOGY & ADDITIONAL TESTS:  Results Reviewed:   Imaging Personally Reviewed:  Electrocardiogram Personally Reviewed:

## 2024-11-09 NOTE — PROGRESS NOTE ADULT - SUBJECTIVE AND OBJECTIVE BOX
HPI: 63M with PMH of DM, HTN, HLD, GERD, on ASA81 daily, known history of pituitary tumor diagnosed 8 years ago in [facility] with no follow up since then, presents with generalized weakness x 1 day. Patient reports difficulty getting out of his chair with unsteady gait since this morning. Patient currently c/o mild headache and burning with urination. Denies any recent falls/trauma, vision changes, dizziness, nausea/vomiting, numbness, or tingling. 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 Pt also noted to have TWI on EKG, cards consulted and further w/u pending. Pt reportedly claims to be compliant on home cardiac medications. Pt admitted to neurosurgery for likely surgical resection of pituitary tumor with Dr. Harmon.    INTERVAL HPI/OVERNIGHT EVENTS:  Patient seen and examined by neurosurgery team. Patient POD #3 from [facility] for pituitary mass resection. Patient reports that he feels well, admits to headache. Post op MRI performed yesterday, results below. Downgraded from stepdown to tele. Na 143, up from 135 two days ago am, UOP has been stable. No other acute events reported.     Vital Signs Last 24 Hrs  T(C): 36.8 (09 Nov 2024 07:49), Max: 37.2 (08 Nov 2024 16:53)  T(F): 98.3 (09 Nov 2024 07:49), Max: 99 (08 Nov 2024 16:53)  HR: 65 (09 Nov 2024 07:49) (65 - 84)  BP: 119/70 (09 Nov 2024 07:49) (118/69 - 154/82)  BP(mean): --  RR: 18 (09 Nov 2024 07:49) (16 - 18)  SpO2: 94% (09 Nov 2024 07:49) (94% - 98%)    Parameters below as of 09 Nov 2024 07:49  Patient On (Oxygen Delivery Method): room air    PHYSICAL EXAM:  GENERAL: NAD, well-groomed  HEAD:  Atraumatic, normocephalic  WOUND: Dressing clean dry intact  AMARIS COMA SCORE: E- V- M- = 15  MENTAL STATUS: AAO x3; Awake; Opens eyes spontaneously; Appropriately conversant without aphasia; following simple commands  CRANIAL NERVES: PERRL. EOMI without nystagmus. Face symmetric w/ normal eye closure and smile, tongue midline. Hearing grossly intact. Speech clear. Head turning and shoulder shrug intact.   MOTOR: strength 5/5 b/l upper and lower extremities  SENSATION: grossly intact to light touch all extremities  EXTREMITIES:  no clubbing, cyanosis, or edema  SKIN: Warm, dry    LABS:                        9.2    8.72  )-----------( 208      ( 09 Nov 2024 04:47 )             27.8     11-09    143  |  105  |  17.6  ----------------------------<  101[H]  4.5   |  28.0  |  1.04    Ca    8.9      09 Nov 2024 04:47  Phos  2.8     11-09  Mg     2.1     11-09    Urinalysis Basic - ( 09 Nov 2024 04:47 )    Color: x / Appearance: x / SG: x / pH: x  Gluc: 101 mg/dL / Ketone: x  / Bili: x / Urobili: x   Blood: x / Protein: x / Nitrite: x   Leuk Esterase: x / RBC: x / WBC x   Sq Epi: x / Non Sq Epi: x / Bacteria: x    11-08 @ 07:01  -  11-09 @ 07:00  --------------------------------------------------------  IN: 830 mL / OUT: 2650 mL / NET: -1820 mL    RADIOLOGY & ADDITIONAL TESTS:    < from: MR Sella alone w/wo IV Cont (11.07.24 @ 15:27) >  IMPRESSION:    1.  Postsurgical changes with excellent decompression of the optic chiasm.  2.  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

## 2024-11-09 NOTE — PROGRESS NOTE ADULT - ASSESSMENT
63M PMH DM, HTN, HLD, GERD, known history of pituitary tumor diagnosed 8 years ago in [facility], admitted with 2.7 x 2.7 x 3.9 cm pituitary mass with chiasmatic encroachment, now s/p endoscopic endonasal transphenoidal resection of pituitary tumor 11/6/2024.   - POD #3  - Exam stable  - Na 143, UOP stable        Plan:  - Discussed with Dr. Harmon   - Q4 neuro checks,   - Strict I&O q4 hours, DI watch   - SBP goal 100-160  - DVT prophyaxis: SCDs, lovenox   - Endocrinology following  - Transphenoidal precautions: NO nose blowing, CPAP/BiPAP, nasal cannula, nasal swabs, NGT, straws or incentive spirometry; avoid straining  - Monitor for any signs of CSF leak  - Na 143 today  - Diet: DASH, CCD  - Last BM 11/8, bowel regimen: senna, miralax increased to BID, PO dulcolax  - Ocean nasal spray, ENT following, recs appreciated  - Continue home HTN/HLD meds  - Pain control PRN, avoid oversedation  - Dispo: PT/OT recommended outpatient PT and balance therapy, possible d/c today 11/9 or tomorrow 11/10 pending endocrinology workup

## 2024-11-09 NOTE — PROGRESS NOTE ADULT - PROVIDER SPECIALTY LIST ADULT
ENT
Hospitalist
Hospitalist
NSICU
Neurosurgery
Neurosurgery
Physiatry
ENT
Endocrinology
Hospitalist
Endocrinology
Hospitalist
Neurosurgery
ENT
Endocrinology
Hospitalist
Hospitalist
Neurosurgery
Hospitalist

## 2024-11-09 NOTE — DISCHARGE NOTE PROVIDER - NSDCMRMEDTOKEN_GEN_ALL_CORE_FT
atorvastatin 20 mg oral tablet: 1 tab(s) orally once a day (at bedtime)  losartan 50 mg oral tablet: 1 tab(s) orally once a day  metFORMIN 500 mg oral tablet: 1 tab(s) orally 2 times a day  metoprolol succinate 50 mg oral tablet, extended release: 1 tab(s) orally 2 times a day  NIFEdipine (Eqv-Adalat CC) 60 mg oral tablet, extended release: 1 tab(s) orally once a day   acetaminophen 325 mg oral tablet: 2 tab(s) orally every 6 hours as needed for Mild Pain (1 - 3)  atorvastatin 20 mg oral tablet: 1 tab(s) orally once a day (at bedtime)  Balance and Vestibular Therapy: Until cleared by therapist  Cortef 10 mg oral tablet: 1 tab(s) orally once a day Please take until your follow up appointment with your endocrinologist  losartan 50 mg oral tablet: 1 tab(s) orally once a day  metFORMIN 500 mg oral tablet: 1 tab(s) orally 2 times a day  metoprolol succinate 50 mg oral tablet, extended release: 1 tab(s) orally 2 times a day  NIFEdipine (Eqv-Adalat CC) 60 mg oral tablet, extended release: 1 tab(s) orally once a day  pantoprazole 40 mg oral delayed release tablet: 1 tab(s) orally once a day (before a meal) Please follow up with general practitioner for refills as needed  Physical Therapy: 3-5 times per week as needed or until cleared by PT  polyethylene glycol 3350 oral powder for reconstitution: 17 gram(s) orally 2 times a day as needed for  constipation  Rolling Walker: Use as needed/directed by PT  senna leaf extract oral tablet: 2 tab(s) orally once a day (at bedtime)  tamsulosin 0.4 mg oral capsule: 1 cap(s) orally once a day (at bedtime) Please follow up with general practitioner and/or urologist outpatient

## 2024-11-09 NOTE — DISCHARGE NOTE PROVIDER - CARE PROVIDERS DIRECT ADDRESSES
[email or web address]

## 2024-11-09 NOTE — PROGRESS NOTE ADULT - ASSESSMENT
63 M with PMH of DM, HTN, HLD, GERD, on ASA81 daily, known history of pituitary tumor diagnosed 8 years ago in [facility] with no follow up since then, presents with generalized weakness x 1 day. Patient reports difficulty getting out of his chair with unsteady gait since this morning. Patient currently c/o mild headache and burning with urination. Denies any recent falls/trauma, vision changes, dizziness, nausea/vomiting, numbness, or tingling. 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 Pt also noted to have TWI on EKG, cards consulted. t reportedly claims to be compliant on home cardiac medications. Pt admitted to neurosurgery surgical resection of pituitary tumor with Dr. Harmon. Patient underwent resection on 11/6.     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 Postop AM cortisol is 5 which is low, ACTH normal 44Started on PO hydrocortisone 10mg in AM/5mg in PM( before 4 pm), will probably only need steroids for a short time, ACTH level is normal.  - Monitor for DI, strict I&Os and BMP. Serum NA stable, 143  - TSH normal but unreliable in pituitary surgery, free thyroxine pending      # Anemia, normocytic  - Hb is stable   monitor CBC     # Abnormal EKG   - TTE reviewed   - Cardiac CTA done: Minimal non-obstructive CAD   - Cardio consult noted, Recc ASA 81 mg daily once cleared by neurosurgery.     # DM   A1C 7.2   Uncontrolled  - Blood glucose monitoring with sliding scale Lispro  - on pradial insulin 10 unit and Lantus 12 units per Endocrinology recommendations   - when patient discharged please ensure proper insulin teaching is done if endo recs to dc home on insulin    # HTN  # HLD   - Toprol XL, Losartan, Nifedipine  - statin     DVT ppx - SCDs     Dispo: Ok to dc home from medicine perspective once cleared by Neuro and Endocrinology.

## 2024-11-09 NOTE — PROGRESS NOTE ADULT - REASON FOR ADMISSION
Pituitary Tumor
generalized weakness
Pituitary Tumor

## 2024-11-09 NOTE — DISCHARGE NOTE PROVIDER - NSDCACTIVITY_GEN_ALL_CORE
No restrictions/Do not drive or operate machinery/Showering allowed/No heavy lifting/straining/Follow Instructions Provided by your Surgical Team/Activity as tolerated

## 2024-11-09 NOTE — DISCHARGE NOTE PROVIDER - HOSPITAL COURSE
63M with PMH of DM, HTN, HLD, GERD, on ASA81 daily, known history of pituitary tumor diagnosed 8 years ago in [facility] with no follow up since then, presents with generalized weakness x 1 day. Pt found to have a large pituitary mass with chiasmatic encroachment on CTH and MRB. Pt admitted 10/28/24 and underwent neuro opthalmology evalon 11/5 for visual field testing. Pt now s/p endoscopic transphenoidal resection of pituitary tumor on 11/6 with Dr. Harmon and Dr. Baeza, POD #4. Pt has worked with PT and has been cleared for discharge to home with outpatient PT and balance PT. Pt has been deemed ready for discharge on DATE. All intructions and follow up information has been provided to the patient. 63M with PMH of DM, HTN, HLD, GERD, on ASA81 daily, known history of pituitary tumor diagnosed 8 years ago in [facility] with no follow up since then, presents with generalized weakness x 1 day. Pt found to have a large pituitary mass with chiasmatic encroachment on CTH and MRB. Pt admitted 10/28/24 and underwent neuro opthalmology evalon 11/5 for visual field testing. Pt now s/p endoscopic transphenoidal resection of pituitary tumor on 11/6 with Dr. Harmon and Dr. Baeza, POD #5. Pt has worked with PT and has been cleared for discharge to home with outpatient PT and balance PT. Pt has been deemed ready for discharge on 11/10. All instructions and follow up information has been provided to the patient.

## 2024-11-09 NOTE — PROGRESS NOTE ADULT - THIS PATIENT HAS THE FOLLOWING CONDITION(S)/DIAGNOSES ON THIS ADMISSION:
Cerebral Edema/Brain Compression / Herniation
None
Brain Compression / Herniation
Cerebral Edema/Brain Compression / Herniation
None

## 2024-11-09 NOTE — DISCHARGE NOTE PROVIDER - NSDCFUADDINST_GEN_ALL_CORE_FT
Diet: You should continue a Consistent Carb diet. Ensure a well balanced and proper diet to help with proper wound healing. It is imperative for adequate water intake, dietary fiber intake, and ambulation as tolerated to avoid constipation. Please hold aspirin, NSAIDs, goody's powder, anticoagulation, vitamin E, turmeric/machado lattes, cinnamon pills, fish oil, ginseng, and all other supplements until cleared by your neurosurgeon on an outpatient follow up appointment    Pain: It is common to have a headache or incisional pain after surgery. You may take the pain medication we prescribe, or over the counter Tylenol. Pain medication, especially narcotics, can cause constipation. Use stool softeners or mild laxative as needed.     Medication: You have been started on a new medication called _________    Activity: Avoid straining, heavy lifting (objects greater than 10 pounds), strenuous activity, twisting, bending, and vigorous exercise. You should not drive or operate machinery until cleared by your neurosurgeon.   TSP: You have had a surgery that had a transsphenoidal approach through your nose. Due to this approach and it's close proximity to the brain, you should avoid using straws, incentive spirometers, nasal cannula, CPAP/BiPAP/High flow oxygenation, nasogastric tube placements, nose blowing, and nasal swabbing (including COVID testing). Any of the aforementioned can cause you to have a cerebrospinal fluid leak. Monitor for persistent leaking from your nose or the feeling of a persistent postnasal drip    Appointment(s): Follow up with Dr. Harmon in our office outpatient in 2 weeks. Call [phone] to schedule an appointment. It is also important to see your primary physician to keep them up to date regarding your recent admission and for a formal outpatient comprehensive medical review. Call their offices for an appointment as soon as you leave the hospital. If you do not have a primary physician, you may contact the [facility] [place] [phone] located on [address] [place] [address].    Should you develop any new or worsening neurologic symptoms or have concern about your incision, please call our office immediately or visit the emergency department.    Return to ER immediately for any of the following: fever, bleeding, new onset numbness/tingling/weakness, nausea and/or vomiting, chest pain, shortness of breath, confusion, seizure, altered mental status, urinary and/or fecal incontinence or retention.  Diet: You should continue a Consistent Carb diet. Ensure a well balanced and proper diet to help with proper wound healing. It is imperative for adequate water intake, dietary fiber intake, and ambulation as tolerated to avoid constipation. Please hold aspirin, NSAIDs, goody's powder, anticoagulation, vitamin E, turmeric/machado lattes, cinnamon pills, fish oil, ginseng, and all other supplements until cleared by your neurosurgeon on an outpatient follow up appointment    Pain: It is common to have a headache or incisional pain after surgery. You may take the pain medication we prescribe, or over the counter Tylenol. Pain medication, especially narcotics, can cause constipation. Use stool softeners or mild laxative as needed.     Medication: You have been started on a new medication called hydrocortisone 10mg. This is for low levels of cortisol in your blood. You need to take this medication, one tablet daily, until you see the endocrinologist for follow up.    Activity: Avoid straining, heavy lifting (objects greater than 10 pounds), strenuous activity, twisting, bending, and vigorous exercise. You should not drive or operate machinery until cleared by your neurosurgeon.   TSP: You have had a surgery that had a transsphenoidal approach through your nose. Due to this approach and it's close proximity to the brain, you should avoid using straws, incentive spirometers, nasal cannula, CPAP/BiPAP/High flow oxygenation, nasogastric tube placements, nose blowing, and nasal swabbing (including COVID testing). Any of the aforementioned can cause you to have a cerebrospinal fluid leak. Monitor for persistent leaking from your nose or the feeling of a persistent postnasal drip    Appointment(s): Follow up with Dr. Harmon in our office outpatient in 2 weeks. Call [phone] to schedule an appointment. It is also important to see your primary physician to keep them up to date regarding your recent admission and for a formal outpatient comprehensive medical review. Call their offices for an appointment as soon as you leave the hospital. If you do not have a primary physician, you may contact the [facility] [place] [phone] located on [address] [place] [address], [place] [address].    Should you develop any new or worsening neurologic symptoms or have concern about your incision, please call our office immediately or visit the emergency department.    Return to ER immediately for any of the following: fever, bleeding, new onset numbness/tingling/weakness, nausea and/or vomiting, chest pain, shortness of breath, confusion, seizure, altered mental status, urinary and/or fecal incontinence or retention.

## 2024-11-09 NOTE — PROVIDER CONTACT NOTE (OTHER) - BACKGROUND
...no longer felt the tingling in his hands. He felt this way after receiving pain medication for his headache at 10:05 AM.

## 2024-11-09 NOTE — PROVIDER CONTACT NOTE (OTHER) - ASSESSMENT
Vitals assessed (106/68 BP, HR 54). Neurological exam completed (A&Ox4, following all commands). Pt states he is currently in no pain.

## 2024-11-09 NOTE — PROVIDER CONTACT NOTE (OTHER) - SITUATION
Pt reported to nursing aide crushing headache and numbness in both hands. Language line  used at time of the event and pt reported "heaviness in head" and stated that he no longer felt

## 2024-11-10 VITALS
DIASTOLIC BLOOD PRESSURE: 69 MMHG | HEART RATE: 74 BPM | SYSTOLIC BLOOD PRESSURE: 107 MMHG | TEMPERATURE: 98 F | RESPIRATION RATE: 18 BRPM | OXYGEN SATURATION: 95 %

## 2024-11-10 LAB
ANION GAP SERPL CALC-SCNC: 9 MMOL/L — SIGNIFICANT CHANGE UP (ref 5–17)
APPEARANCE UR: CLEAR — SIGNIFICANT CHANGE UP
BILIRUB UR-MCNC: NEGATIVE — SIGNIFICANT CHANGE UP
BUN SERPL-MCNC: 15.2 MG/DL — SIGNIFICANT CHANGE UP (ref 8–20)
CALCIUM SERPL-MCNC: 8.9 MG/DL — SIGNIFICANT CHANGE UP (ref 8.4–10.5)
CHLORIDE SERPL-SCNC: 103 MMOL/L — SIGNIFICANT CHANGE UP (ref 96–108)
CO2 SERPL-SCNC: 29 MMOL/L — SIGNIFICANT CHANGE UP (ref 22–29)
COLOR SPEC: YELLOW — SIGNIFICANT CHANGE UP
CREAT SERPL-MCNC: 1 MG/DL — SIGNIFICANT CHANGE UP (ref 0.5–1.3)
DIFF PNL FLD: NEGATIVE — SIGNIFICANT CHANGE UP
EGFR: 85 ML/MIN/1.73M2 — SIGNIFICANT CHANGE UP
GLUCOSE BLDC GLUCOMTR-MCNC: 117 MG/DL — HIGH (ref 70–99)
GLUCOSE BLDC GLUCOMTR-MCNC: 128 MG/DL — HIGH (ref 70–99)
GLUCOSE BLDC GLUCOMTR-MCNC: 169 MG/DL — HIGH (ref 70–99)
GLUCOSE SERPL-MCNC: 103 MG/DL — HIGH (ref 70–99)
GLUCOSE UR QL: NEGATIVE MG/DL — SIGNIFICANT CHANGE UP
HCT VFR BLD CALC: 27.6 % — LOW (ref 39–50)
HGB BLD-MCNC: 9.1 G/DL — LOW (ref 13–17)
KETONES UR-MCNC: NEGATIVE MG/DL — SIGNIFICANT CHANGE UP
LEUKOCYTE ESTERASE UR-ACNC: NEGATIVE — SIGNIFICANT CHANGE UP
MAGNESIUM SERPL-MCNC: 2.3 MG/DL — SIGNIFICANT CHANGE UP (ref 1.6–2.6)
MCHC RBC-ENTMCNC: 28.3 PG — SIGNIFICANT CHANGE UP (ref 27–34)
MCHC RBC-ENTMCNC: 33 G/DL — SIGNIFICANT CHANGE UP (ref 32–36)
MCV RBC AUTO: 86 FL — SIGNIFICANT CHANGE UP (ref 80–100)
NITRITE UR-MCNC: NEGATIVE — SIGNIFICANT CHANGE UP
PH UR: 6.5 — SIGNIFICANT CHANGE UP (ref 5–8)
PHOSPHATE SERPL-MCNC: 3.5 MG/DL — SIGNIFICANT CHANGE UP (ref 2.4–4.7)
PLATELET # BLD AUTO: 220 K/UL — SIGNIFICANT CHANGE UP (ref 150–400)
POTASSIUM SERPL-MCNC: 4.7 MMOL/L — SIGNIFICANT CHANGE UP (ref 3.5–5.3)
POTASSIUM SERPL-SCNC: 4.7 MMOL/L — SIGNIFICANT CHANGE UP (ref 3.5–5.3)
PROT UR-MCNC: NEGATIVE MG/DL — SIGNIFICANT CHANGE UP
RBC # BLD: 3.21 M/UL — LOW (ref 4.2–5.8)
RBC # FLD: 11.8 % — SIGNIFICANT CHANGE UP (ref 10.3–14.5)
SODIUM SERPL-SCNC: 141 MMOL/L — SIGNIFICANT CHANGE UP (ref 135–145)
SP GR SPEC: 1.01 — SIGNIFICANT CHANGE UP (ref 1–1.03)
UROBILINOGEN FLD QL: 1 MG/DL — SIGNIFICANT CHANGE UP (ref 0.2–1)
WBC # BLD: 7.72 K/UL — SIGNIFICANT CHANGE UP (ref 3.8–10.5)
WBC # FLD AUTO: 7.72 K/UL — SIGNIFICANT CHANGE UP (ref 3.8–10.5)

## 2024-11-10 PROCEDURE — 70553 MRI BRAIN STEM W/O & W/DYE: CPT | Mod: MC

## 2024-11-10 PROCEDURE — C1763: CPT

## 2024-11-10 PROCEDURE — 97116 GAIT TRAINING THERAPY: CPT

## 2024-11-10 PROCEDURE — 82728 ASSAY OF FERRITIN: CPT

## 2024-11-10 PROCEDURE — 85610 PROTHROMBIN TIME: CPT

## 2024-11-10 PROCEDURE — 86900 BLOOD TYPING SEROLOGIC ABO: CPT

## 2024-11-10 PROCEDURE — 87086 URINE CULTURE/COLONY COUNT: CPT

## 2024-11-10 PROCEDURE — 85027 COMPLETE CBC AUTOMATED: CPT

## 2024-11-10 PROCEDURE — 82024 ASSAY OF ACTH: CPT

## 2024-11-10 PROCEDURE — 70498 CT ANGIOGRAPHY NECK: CPT | Mod: MC

## 2024-11-10 PROCEDURE — 93005 ELECTROCARDIOGRAM TRACING: CPT

## 2024-11-10 PROCEDURE — 80053 COMPREHEN METABOLIC PANEL: CPT

## 2024-11-10 PROCEDURE — 84466 ASSAY OF TRANSFERRIN: CPT

## 2024-11-10 PROCEDURE — 88305 TISSUE EXAM BY PATHOLOGIST: CPT

## 2024-11-10 PROCEDURE — 83550 IRON BINDING TEST: CPT

## 2024-11-10 PROCEDURE — 70450 CT HEAD/BRAIN W/O DYE: CPT | Mod: MC

## 2024-11-10 PROCEDURE — 96374 THER/PROPH/DIAG INJ IV PUSH: CPT

## 2024-11-10 PROCEDURE — C1889: CPT

## 2024-11-10 PROCEDURE — 83540 ASSAY OF IRON: CPT

## 2024-11-10 PROCEDURE — 84305 ASSAY OF SOMATOMEDIN: CPT

## 2024-11-10 PROCEDURE — 86901 BLOOD TYPING SEROLOGIC RH(D): CPT

## 2024-11-10 PROCEDURE — 83735 ASSAY OF MAGNESIUM: CPT

## 2024-11-10 PROCEDURE — 85025 COMPLETE CBC W/AUTO DIFF WBC: CPT

## 2024-11-10 PROCEDURE — 87641 MR-STAPH DNA AMP PROBE: CPT

## 2024-11-10 PROCEDURE — 83036 HEMOGLOBIN GLYCOSYLATED A1C: CPT

## 2024-11-10 PROCEDURE — 75574 CT ANGIO HRT W/3D IMAGE: CPT | Mod: MC

## 2024-11-10 PROCEDURE — G0378: CPT

## 2024-11-10 PROCEDURE — 82962 GLUCOSE BLOOD TEST: CPT

## 2024-11-10 PROCEDURE — 70496 CT ANGIOGRAPHY HEAD: CPT | Mod: MC

## 2024-11-10 PROCEDURE — 99285 EMERGENCY DEPT VISIT HI MDM: CPT | Mod: 25

## 2024-11-10 PROCEDURE — 82533 TOTAL CORTISOL: CPT

## 2024-11-10 PROCEDURE — 80048 BASIC METABOLIC PNL TOTAL CA: CPT

## 2024-11-10 PROCEDURE — 85730 THROMBOPLASTIN TIME PARTIAL: CPT

## 2024-11-10 PROCEDURE — 97163 PT EVAL HIGH COMPLEX 45 MIN: CPT

## 2024-11-10 PROCEDURE — 87640 STAPH A DNA AMP PROBE: CPT

## 2024-11-10 PROCEDURE — 84443 ASSAY THYROID STIM HORMONE: CPT

## 2024-11-10 PROCEDURE — 87637 SARSCOV2&INF A&B&RSV AMP PRB: CPT

## 2024-11-10 PROCEDURE — 87186 SC STD MICRODIL/AGAR DIL: CPT

## 2024-11-10 PROCEDURE — 87077 CULTURE AEROBIC IDENTIFY: CPT

## 2024-11-10 PROCEDURE — 86923 COMPATIBILITY TEST ELECTRIC: CPT

## 2024-11-10 PROCEDURE — 84484 ASSAY OF TROPONIN QUANT: CPT

## 2024-11-10 PROCEDURE — 71045 X-RAY EXAM CHEST 1 VIEW: CPT

## 2024-11-10 PROCEDURE — 96361 HYDRATE IV INFUSION ADD-ON: CPT

## 2024-11-10 PROCEDURE — 88307 TISSUE EXAM BY PATHOLOGIST: CPT

## 2024-11-10 PROCEDURE — 84439 ASSAY OF FREE THYROXINE: CPT

## 2024-11-10 PROCEDURE — 82607 VITAMIN B-12: CPT

## 2024-11-10 PROCEDURE — 81003 URINALYSIS AUTO W/O SCOPE: CPT

## 2024-11-10 PROCEDURE — 84146 ASSAY OF PROLACTIN: CPT

## 2024-11-10 PROCEDURE — 97167 OT EVAL HIGH COMPLEX 60 MIN: CPT

## 2024-11-10 PROCEDURE — 87491 CHLMYD TRACH DNA AMP PROBE: CPT

## 2024-11-10 PROCEDURE — 82550 ASSAY OF CK (CPK): CPT

## 2024-11-10 PROCEDURE — 97530 THERAPEUTIC ACTIVITIES: CPT

## 2024-11-10 PROCEDURE — 84100 ASSAY OF PHOSPHORUS: CPT

## 2024-11-10 PROCEDURE — 86850 RBC ANTIBODY SCREEN: CPT

## 2024-11-10 PROCEDURE — 93970 EXTREMITY STUDY: CPT

## 2024-11-10 PROCEDURE — 97535 SELF CARE MNGMENT TRAINING: CPT

## 2024-11-10 PROCEDURE — 80061 LIPID PANEL: CPT

## 2024-11-10 PROCEDURE — 82553 CREATINE MB FRACTION: CPT

## 2024-11-10 PROCEDURE — C8929: CPT

## 2024-11-10 PROCEDURE — 82746 ASSAY OF FOLIC ACID SERUM: CPT

## 2024-11-10 PROCEDURE — 87591 N.GONORRHOEAE DNA AMP PROB: CPT

## 2024-11-10 PROCEDURE — 36415 COLL VENOUS BLD VENIPUNCTURE: CPT

## 2024-11-10 PROCEDURE — 83003 ASSAY GROWTH HORMONE (HGH): CPT

## 2024-11-10 PROCEDURE — 70486 CT MAXILLOFACIAL W/O DYE: CPT | Mod: MC

## 2024-11-10 RX ORDER — TAMSULOSIN HCL 0.4 MG
1 CAPSULE ORAL
Qty: 14 | Refills: 0
Start: 2024-11-10 | End: 2024-11-23

## 2024-11-10 RX ORDER — POLYETHYLENE GLYCOL 3350 17 G/17G
17 POWDER, FOR SOLUTION ORAL
Qty: 238 | Refills: 0
Start: 2024-11-10 | End: 2024-11-16

## 2024-11-10 RX ORDER — HYDROCORTISONE 10 MG/1
1 TABLET ORAL
Qty: 45 | Refills: 0
Start: 2024-11-10 | End: 2024-12-24

## 2024-11-10 RX ORDER — PANTOPRAZOLE SODIUM 40 MG/1
1 TABLET, DELAYED RELEASE ORAL
Qty: 14 | Refills: 0
Start: 2024-11-10 | End: 2024-11-23

## 2024-11-10 RX ORDER — ACETAMINOPHEN 500 MG
2 TABLET ORAL
Qty: 56 | Refills: 0
Start: 2024-11-10 | End: 2024-11-16

## 2024-11-10 RX ORDER — OXYCODONE HYDROCHLORIDE 30 MG/1
1 TABLET ORAL
Qty: 42 | Refills: 0
Start: 2024-11-10 | End: 2024-11-16

## 2024-11-10 RX ORDER — SENNA 187 MG
2 TABLET ORAL
Qty: 14 | Refills: 0
Start: 2024-11-10 | End: 2024-11-16

## 2024-11-10 RX ADMIN — Medication 2: at 12:46

## 2024-11-10 RX ADMIN — Medication 10 UNIT(S): at 12:46

## 2024-11-10 RX ADMIN — Medication 1 SPRAY(S): at 02:51

## 2024-11-10 RX ADMIN — PANTOPRAZOLE SODIUM 40 MILLIGRAM(S): 40 TABLET, DELAYED RELEASE ORAL at 09:42

## 2024-11-10 RX ADMIN — CHLORHEXIDINE GLUCONATE 1 APPLICATION(S): 40 SOLUTION TOPICAL at 07:02

## 2024-11-10 RX ADMIN — Medication 10 UNIT(S): at 09:39

## 2024-11-10 RX ADMIN — Medication 5 MILLIGRAM(S): at 09:45

## 2024-11-10 RX ADMIN — Medication 1 SPRAY(S): at 09:44

## 2024-11-10 RX ADMIN — HYDROCORTISONE 10 MILLIGRAM(S): 10 TABLET ORAL at 09:20

## 2024-11-10 RX ADMIN — POLYETHYLENE GLYCOL 3350 17 GRAM(S): 17 POWDER, FOR SOLUTION ORAL at 05:17

## 2024-11-10 RX ADMIN — Medication 1 SPRAY(S): at 05:17

## 2024-11-10 RX ADMIN — Medication 60 MILLIGRAM(S): at 05:16

## 2024-11-10 RX ADMIN — Medication 50 MILLIGRAM(S): at 05:16

## 2024-11-10 NOTE — DISCHARGE NOTE NURSING/CASE MANAGEMENT/SOCIAL WORK - PATIENT PORTAL LINK FT
You can access the FollowMyHealth Patient Portal offered by [facility] by registering at the following website: [email or web address]. By joining [employer]’s FollowMyHealth portal, you will also be able to view your health information using other applications (apps) compatible with our system.

## 2024-11-11 ENCOUNTER — NON-APPOINTMENT (OUTPATIENT)
Age: 64
End: 2024-11-11

## 2024-11-12 LAB
GLUCOSE BLDC GLUCOMTR-MCNC: 197 MG/DL — HIGH (ref 70–99)
SURGICAL PATHOLOGY STUDY: SIGNIFICANT CHANGE UP

## 2024-12-06 ENCOUNTER — APPOINTMENT (OUTPATIENT)
Dept: OTOLARYNGOLOGY | Facility: CLINIC | Age: 64
End: 2024-12-06

## 2024-12-12 ENCOUNTER — APPOINTMENT (OUTPATIENT)
Dept: NEUROSURGERY | Facility: CLINIC | Age: 64
End: 2024-12-12

## 2024-12-12 VITALS
HEIGHT: 71 IN | WEIGHT: 213 LBS | OXYGEN SATURATION: 96 % | SYSTOLIC BLOOD PRESSURE: 125 MMHG | BODY MASS INDEX: 29.82 KG/M2 | DIASTOLIC BLOOD PRESSURE: 75 MMHG | TEMPERATURE: 98.6 F | HEART RATE: 75 BPM

## 2024-12-12 DIAGNOSIS — D35.2 BENIGN NEOPLASM OF PITUITARY GLAND: ICD-10-CM

## 2024-12-12 PROCEDURE — 99024 POSTOP FOLLOW-UP VISIT: CPT

## 2024-12-12 RX ORDER — TAMSULOSIN HYDROCHLORIDE 0.4 MG/1
0.4 CAPSULE ORAL
Refills: 0 | Status: ACTIVE | COMMUNITY

## 2024-12-12 RX ORDER — ATORVASTATIN CALCIUM 20 MG/1
20 TABLET, FILM COATED ORAL
Refills: 0 | Status: ACTIVE | COMMUNITY

## 2024-12-12 RX ORDER — DICLOFENAC SODIUM 75 MG/1
75 TABLET, DELAYED RELEASE ORAL
Refills: 0 | Status: ACTIVE | COMMUNITY

## 2024-12-12 RX ORDER — HYDROCORTISONE 10 MG/1
10 TABLET ORAL
Refills: 0 | Status: ACTIVE | COMMUNITY

## 2024-12-12 RX ORDER — METFORMIN HYDROCHLORIDE 500 MG/1
500 TABLET, COATED ORAL
Refills: 0 | Status: ACTIVE | COMMUNITY

## 2024-12-12 RX ORDER — PANTOPRAZOLE SODIUM 40 MG/1
40 TABLET, DELAYED RELEASE ORAL
Refills: 0 | Status: ACTIVE | COMMUNITY

## 2024-12-12 RX ORDER — ACETAMINOPHEN 500 MG/1
TABLET ORAL
Refills: 0 | Status: ACTIVE | COMMUNITY

## 2024-12-12 RX ORDER — NIFEDIPINE 60 MG
60 TABLET, EXTENDED RELEASE ORAL
Refills: 0 | Status: ACTIVE | COMMUNITY

## 2024-12-12 RX ORDER — METOPROLOL SUCCINATE 50 MG/1
50 TABLET, EXTENDED RELEASE ORAL
Refills: 0 | Status: ACTIVE | COMMUNITY

## 2024-12-12 RX ORDER — LOSARTAN POTASSIUM 50 MG/1
50 TABLET, FILM COATED ORAL
Refills: 0 | Status: ACTIVE | COMMUNITY

## 2025-01-06 ENCOUNTER — APPOINTMENT (OUTPATIENT)
Dept: OTOLARYNGOLOGY | Facility: CLINIC | Age: 65
End: 2025-01-06

## 2025-01-10 ENCOUNTER — APPOINTMENT (OUTPATIENT)
Dept: OTOLARYNGOLOGY | Facility: CLINIC | Age: 65
End: 2025-01-10

## 2025-01-10 VITALS
BODY MASS INDEX: 29.82 KG/M2 | HEIGHT: 71 IN | SYSTOLIC BLOOD PRESSURE: 172 MMHG | WEIGHT: 213 LBS | HEART RATE: 88 BPM | DIASTOLIC BLOOD PRESSURE: 92 MMHG

## 2025-01-10 DIAGNOSIS — Z78.9 OTHER SPECIFIED HEALTH STATUS: ICD-10-CM

## 2025-01-10 DIAGNOSIS — Z86.79 PERSONAL HISTORY OF OTHER DISEASES OF THE CIRCULATORY SYSTEM: ICD-10-CM

## 2025-01-10 DIAGNOSIS — Z86.39 PERSONAL HISTORY OF OTHER ENDOCRINE, NUTRITIONAL AND METABOLIC DISEASE: ICD-10-CM

## 2025-01-10 DIAGNOSIS — D35.2 BENIGN NEOPLASM OF PITUITARY GLAND: ICD-10-CM

## 2025-01-10 PROCEDURE — 99024 POSTOP FOLLOW-UP VISIT: CPT

## 2025-01-10 PROCEDURE — 31231 NASAL ENDOSCOPY DX: CPT | Mod: 58

## 2025-01-13 ENCOUNTER — APPOINTMENT (OUTPATIENT)
Dept: ENDOCRINOLOGY | Facility: CLINIC | Age: 65
End: 2025-01-13

## 2025-02-14 ENCOUNTER — EMERGENCY (EMERGENCY)
Facility: HOSPITAL | Age: 65
LOS: 1 days | Discharge: HOME | End: 2025-02-14
Attending: STUDENT IN AN ORGANIZED HEALTH CARE EDUCATION/TRAINING PROGRAM
Payer: COMMERCIAL

## 2025-02-14 VITALS
SYSTOLIC BLOOD PRESSURE: 159 MMHG | TEMPERATURE: 98 F | HEART RATE: 74 BPM | DIASTOLIC BLOOD PRESSURE: 89 MMHG | RESPIRATION RATE: 16 BRPM | OXYGEN SATURATION: 97 %

## 2025-02-14 LAB
ALBUMIN SERPL ELPH-MCNC: 4.5 G/DL — SIGNIFICANT CHANGE UP (ref 3.3–5.2)
ALP SERPL-CCNC: 93 U/L — SIGNIFICANT CHANGE UP (ref 40–120)
ALT FLD-CCNC: 11 U/L — SIGNIFICANT CHANGE UP
ANION GAP SERPL CALC-SCNC: 15 MMOL/L — SIGNIFICANT CHANGE UP (ref 5–17)
APPEARANCE UR: CLEAR — SIGNIFICANT CHANGE UP
AST SERPL-CCNC: 22 U/L — SIGNIFICANT CHANGE UP
BASOPHILS # BLD AUTO: 0.06 K/UL — SIGNIFICANT CHANGE UP (ref 0–0.2)
BASOPHILS NFR BLD AUTO: 0.8 % — SIGNIFICANT CHANGE UP (ref 0–2)
BILIRUB SERPL-MCNC: 0.3 MG/DL — LOW (ref 0.4–2)
BILIRUB UR-MCNC: NEGATIVE — SIGNIFICANT CHANGE UP
BUN SERPL-MCNC: 9.6 MG/DL — SIGNIFICANT CHANGE UP (ref 8–20)
CALCIUM SERPL-MCNC: 9.4 MG/DL — SIGNIFICANT CHANGE UP (ref 8.4–10.5)
CHLORIDE SERPL-SCNC: 98 MMOL/L — SIGNIFICANT CHANGE UP (ref 96–108)
CO2 SERPL-SCNC: 25 MMOL/L — SIGNIFICANT CHANGE UP (ref 22–29)
COLOR SPEC: YELLOW — SIGNIFICANT CHANGE UP
CREAT SERPL-MCNC: 0.92 MG/DL — SIGNIFICANT CHANGE UP (ref 0.5–1.3)
DIFF PNL FLD: NEGATIVE — SIGNIFICANT CHANGE UP
EGFR: 93 ML/MIN/1.73M2 — SIGNIFICANT CHANGE UP
EOSINOPHIL # BLD AUTO: 0.13 K/UL — SIGNIFICANT CHANGE UP (ref 0–0.5)
EOSINOPHIL NFR BLD AUTO: 1.6 % — SIGNIFICANT CHANGE UP (ref 0–6)
GAS PNL BLDV: SIGNIFICANT CHANGE UP
GAS PNL BLDV: SIGNIFICANT CHANGE UP
GLUCOSE SERPL-MCNC: 212 MG/DL — HIGH (ref 70–99)
GLUCOSE UR QL: NEGATIVE MG/DL — SIGNIFICANT CHANGE UP
HCT VFR BLD CALC: 35.2 % — LOW (ref 39–50)
HGB BLD-MCNC: 11.7 G/DL — LOW (ref 13–17)
IMM GRANULOCYTES # BLD AUTO: 0.03 K/UL — SIGNIFICANT CHANGE UP (ref 0–0.07)
IMM GRANULOCYTES NFR BLD AUTO: 0.4 % — SIGNIFICANT CHANGE UP (ref 0–0.9)
KETONES UR-MCNC: ABNORMAL MG/DL
LEUKOCYTE ESTERASE UR-ACNC: NEGATIVE — SIGNIFICANT CHANGE UP
LIDOCAIN IGE QN: 33 U/L — SIGNIFICANT CHANGE UP (ref 22–51)
LYMPHOCYTES # BLD AUTO: 2.29 K/UL — SIGNIFICANT CHANGE UP (ref 1–3.3)
LYMPHOCYTES NFR BLD AUTO: 28.7 % — SIGNIFICANT CHANGE UP (ref 13–44)
MCHC RBC-ENTMCNC: 27.8 PG — SIGNIFICANT CHANGE UP (ref 27–34)
MCHC RBC-ENTMCNC: 33.2 G/DL — SIGNIFICANT CHANGE UP (ref 32–36)
MCV RBC AUTO: 83.6 FL — SIGNIFICANT CHANGE UP (ref 80–100)
MONOCYTES # BLD AUTO: 0.64 K/UL — SIGNIFICANT CHANGE UP (ref 0–0.9)
MONOCYTES NFR BLD AUTO: 8 % — SIGNIFICANT CHANGE UP (ref 2–14)
NEUTROPHILS # BLD AUTO: 4.83 K/UL — SIGNIFICANT CHANGE UP (ref 1.8–7.4)
NEUTROPHILS NFR BLD AUTO: 60.5 % — SIGNIFICANT CHANGE UP (ref 43–77)
NITRITE UR-MCNC: NEGATIVE — SIGNIFICANT CHANGE UP
NRBC # BLD AUTO: 0 K/UL — SIGNIFICANT CHANGE UP (ref 0–0)
NRBC # FLD: 0 K/UL — SIGNIFICANT CHANGE UP (ref 0–0)
NRBC BLD AUTO-RTO: 0 /100 WBCS — SIGNIFICANT CHANGE UP (ref 0–0)
PH UR: 5.5 — SIGNIFICANT CHANGE UP (ref 5–8)
PLATELET # BLD AUTO: 242 K/UL — SIGNIFICANT CHANGE UP (ref 150–400)
PMV BLD: 11.5 FL — SIGNIFICANT CHANGE UP (ref 7–13)
POTASSIUM SERPL-MCNC: 4.2 MMOL/L — SIGNIFICANT CHANGE UP (ref 3.5–5.3)
POTASSIUM SERPL-SCNC: 4.2 MMOL/L — SIGNIFICANT CHANGE UP (ref 3.5–5.3)
PROT SERPL-MCNC: 8 G/DL — SIGNIFICANT CHANGE UP (ref 6.6–8.7)
PROT UR-MCNC: NEGATIVE MG/DL — SIGNIFICANT CHANGE UP
RBC # BLD: 4.21 M/UL — SIGNIFICANT CHANGE UP (ref 4.2–5.8)
RBC # FLD: 12.2 % — SIGNIFICANT CHANGE UP (ref 10.3–14.5)
SODIUM SERPL-SCNC: 138 MMOL/L — SIGNIFICANT CHANGE UP (ref 135–145)
SP GR SPEC: 1.02 — SIGNIFICANT CHANGE UP (ref 1–1.03)
UROBILINOGEN FLD QL: 1 MG/DL — SIGNIFICANT CHANGE UP (ref 0.2–1)
WBC # BLD: 7.98 K/UL — SIGNIFICANT CHANGE UP (ref 3.8–10.5)
WBC # FLD AUTO: 7.98 K/UL — SIGNIFICANT CHANGE UP (ref 3.8–10.5)

## 2025-02-14 PROCEDURE — 74177 CT ABD & PELVIS W/CONTRAST: CPT | Mod: 26

## 2025-02-14 PROCEDURE — 99285 EMERGENCY DEPT VISIT HI MDM: CPT

## 2025-02-14 PROCEDURE — 93010 ELECTROCARDIOGRAM REPORT: CPT

## 2025-02-14 RX ORDER — FAMOTIDINE 10 MG/ML
20 INJECTION INTRAVENOUS ONCE
Refills: 0 | Status: COMPLETED | OUTPATIENT
Start: 2025-02-14 | End: 2025-02-14

## 2025-02-14 RX ORDER — ALUMINUM HYDROXIDE, MAGNESIUM HYDROXIDE, DIMETHICONE 400; 40; 400 MG/5ML; MG/5ML; MG/5ML
10 SUSPENSION ORAL
Qty: 210 | Refills: 0
Start: 2025-02-14 | End: 2025-02-20

## 2025-02-14 RX ORDER — POLYETHYLENE GLYCOL 3350 17 G/17G
17 POWDER, FOR SOLUTION ORAL
Qty: 1 | Refills: 0
Start: 2025-02-14 | End: 2025-02-18

## 2025-02-14 RX ORDER — BACTERIOSTATIC SODIUM CHLORIDE 0.9 %
1000 VIAL (ML) INJECTION ONCE
Refills: 0 | Status: COMPLETED | OUTPATIENT
Start: 2025-02-14 | End: 2025-02-14

## 2025-02-14 RX ORDER — MAGNESIUM, ALUMINUM HYDROXIDE 200-225/5
30 SUSPENSION, ORAL (FINAL DOSE FORM) ORAL ONCE
Refills: 0 | Status: COMPLETED | OUTPATIENT
Start: 2025-02-14 | End: 2025-02-14

## 2025-02-14 RX ORDER — FAMOTIDINE 10 MG/ML
1 INJECTION INTRAVENOUS
Qty: 20 | Refills: 0
Start: 2025-02-14 | End: 2025-02-23

## 2025-02-14 RX ORDER — ISOPROPYL ALCOHOL, BENZOCAINE .7; .06 ML/ML; ML/ML
0 SWAB TOPICAL
Qty: 100 | Refills: 1
Start: 2025-02-14

## 2025-02-14 RX ADMIN — FAMOTIDINE 20 MILLIGRAM(S): 10 INJECTION INTRAVENOUS at 17:07

## 2025-02-14 RX ADMIN — Medication 1000 MILLILITER(S): at 17:07

## 2025-02-14 RX ADMIN — Medication 1000 MILLILITER(S): at 21:53

## 2025-02-14 RX ADMIN — Medication 30 MILLILITER(S): at 17:07

## 2025-02-14 NOTE — ED ADULT TRIAGE NOTE - LANGUAGE ASSISTANCE NEEDED
Juventino You is a 55 y.o. 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 Patient reports he is normally active and can climb a flight of stairs without getting out of breath, but over the past month he has been getting chest pain on exertion and short of breath with activity. Chest pain resolves with rest. He presented to outside hospital with chest pain and LHC showed multivessel disease. Normal EF on echo. Patient currently on heparin gtt and holding coumadin. He was originally transferred to [facility] for CABG, however, patient prefers attempt with PCI. IC was then consulted for evaulation.         Yes-Patient/Caregiver accepts free interpretation services...

## 2025-02-14 NOTE — ED PROVIDER NOTE - PROGRESS NOTE DETAILS
Patient feeling fine repeat the VBG without significant improvement explained to the patient at IV fluids will repeat the VBG medication sent to pharmacy.  Patient needs to follow-up with Gi -   patient signed out to continue care

## 2025-02-14 NOTE — ED ADULT NURSE NOTE - CAS EDP DISCH TYPE
Cardiology Follow Up    [facility]  1938  [address] [facility]  [facility]  ANTHONY [address] 76  [phone]    1  Coronary artery disease due to calcified coronary lesion  POCT ECG   2  Sinus bradycardia  POCT ECG   3  PVC (premature ventricular contraction)  POCT ECG   4  Paroxysmal atrial fibrillation (HCC)  POCT ECG   5  Bigeminy  POCT ECG   6  Pure hypercholesterolemia  POCT ECG       Interval History:  Cardiology follow-up  Patient recently sustained a fall last month, he did have some back and chest trauma  He denies syncope or presyncope however  Denies chest pain or dyspnea  Previously very active for age  States compliant with low-cholesterol diet, lipids last checked total cholesterol 120 HDL 43 LDL 67, adequate control on high-intensity statin therapy, polysomnogram 2 years ago revealed mild obstructive sleep apnea  He is not on positive airway pressure therapy  Did not tolerated    He does have mild hypersomnolence, in the morning, he still very active, he walks regularly, he has no exertional symptoms, denies any significant fatigue, denies any syncope or presyncope    Patient Active Problem List   Diagnosis    Coronary artery disease due to calcified coronary lesion    Malignant neoplasm of overlapping sites of bladder ([facility] )    Pure hypercholesterolemia    Urge incontinence    Benign prostatic hyperplasia with lower urinary tract symptoms    H/O laminectomy    Other fatigue    Stage 3a chronic kidney disease ([address] )    LIOR (obstructive sleep apnea)    PLMD (periodic limb movement disorder)    Sinus bradycardia    PVC (premature ventricular contraction)    Bigeminy    Rosacea    Paroxysmal atrial fibrillation (HCC)    Status post radiofrequency ablation (RFA) operation for arrhythmia    Decreased hearing of both ears    OAB (overactive bladder)    Intertrigo  Abnormal laboratory test    Scalp lesion    Toenail fungus    Elevated BP without diagnosis of hypertension    Acute midline thoracic back pain    Rib pain on left side    Acute pain of left shoulder    Fall    Closed fracture of one rib of left side with routine healing    Degenerative spondylolisthesis     Past Medical History:   Diagnosis Date    Irregular heart beat     Afib     Social History     Socioeconomic History    Marital status:       Spouse name: Not on file    Number of children: Not on file    Years of education: Not on file    Highest education level: Not on file   Occupational History    Not on file   Tobacco Use    Smoking status: Former Smoker     Types: Cigarettes     Start date: 10/8/1961     Quit date: 10/8/1977     Years since quittin 4    Smokeless tobacco: Never Used   Vaping Use    Vaping Use: Never used   Substance and Sexual Activity    Alcohol use: Yes     Comment: occasional wine    Drug use: Never    Sexual activity: Not Currently   Other Topics Concern    Not on file   Social History Narrative    Not on file     Social Determinants of Health     Financial Resource Strain: Not on file   Food Insecurity: Not on file   Transportation Needs: Not on file   Physical Activity: Not on file   Stress: Not on file   Social Connections: Not on file   Intimate Partner Violence: Not on file   Housing Stability: Not on file      Family History   Problem Relation Age of Onset    Pancreatic cancer Mother     Liver cancer Father     Cancer Brother      Past Surgical History:   Procedure Laterality Date    CARDIAC SURGERY      Afib ablation    CATARACT EXTRACTION, BILATERAL Bilateral [id number]    MN CYSTOURETHRO W/IMPLANT N/A 2021    Procedure: CYSTOSCOPY WITH INSERTION Michi Jesus;  Surgeon: Sameera Pulido MD;  Location: [facility] MAIN OR;  Service: Urology       Current Outpatient Medications:     aspirin (ECOTRIN LOW STRENGTH) 81 mg EC tablet, Take 81 mg by mouth daily (Patient not taking: Reported on 2/2/2022 ), Disp: , Rfl:     atorvastatin (LIPITOR) 20 mg tablet, Take 1 tablet (20 mg total) by mouth daily, Disp: 90 tablet, Rfl: 2    cholecalciferol (VITAMIN D3) 1,000 units tablet, Take 1,000 Units by mouth daily, Disp: , Rfl:     clotrimazole-betamethasone (LOTRISONE) 1-0 05 % cream, Apply topically 2 (two) times a day, Disp: 30 g, Rfl: 0    Coenzyme Q10 (CoQ10) 100 MG CAPS, Take by mouth daily , Disp: , Rfl:     cyanocobalamin (VITAMIN B-12) 100 mcg tablet, Take by mouth daily, Disp: , Rfl:     docusate sodium (COLACE) 100 mg capsule, Take 1 capsule (100 mg total) by mouth 2 (two) times a day for 15 days, Disp: 30 capsule, Rfl: 0    Glucosamine-Chondroit-Vit C-Mn (GLUCOSAMINE CHONDR 1500 COMPLX PO), Take by mouth daily  (Patient not taking: Reported on 2/2/2022 ), Disp: , Rfl:     HYDROcodone-acetaminophen (NORCO) 5-325 mg per tablet, Take 1 tablet by mouth every 6 (six) hours as needed, Disp: , Rfl:     metroNIDAZOLE (METROGEL) 0 75 % gel, , Disp: , Rfl:     Mirabegron ER 25 MG TB24, Take 25 mg by mouth daily at bedtime, Disp: 30 tablet, Rfl: 10    Multiple Vitamin (multivitamin) capsule, Take 1 capsule by mouth daily, Disp: , Rfl:     Omega 3 1000 MG CAPS, Take by mouth daily , Disp: , Rfl:     trospium chloride (SANCTURA) 20 mg tablet, Take 1 tablet (20 mg total) by mouth 2 (two) times a day, Disp: 60 tablet, Rfl: 3  Allergies   Allergen Reactions    Sulfa Antibiotics Hives       Labs:  Office Visit on 01/05/2022   Component Date Value    LEUKOCYTE ESTERASE,UA 01/05/2022 -     NITRITE,UA 01/05/2022 -     SL AMB POCT UROBILINOGEN 01/05/2022 0 2     POCT URINE PROTEIN 01/05/2022 -      PH,UA 01/05/2022 5 0     BLOOD,UA 01/05/2022 -     SPECIFIC GRAVITY,UA 01/05/2022 1 020     KETONES,UA 01/05/2022 -     BILIRUBIN,UA 01/05/2022 -     GLUCOSE, UA 01/05/2022 -      COLOR,UA 01/05/2022 mary kay     CLARITY,UA 01/05/2022 clear     POST-VOID RESIDUAL VOLUM* 01/05/2022 17    Orders Only on 12/13/2021   Component Date Value    SARS-COV-2 12/13/2021 Not Detected    Orders Only on 10/12/2021   Component Date Value    Hemoglobin A1C 10/12/2021 5 6      Imaging: No results found  Review of Systems:  Review of Systems   Constitutional: Negative for activity change, fatigue and unexpected weight change  HENT: Negative for nosebleeds  Eyes: Negative for visual disturbance  Respiratory: Positive for apnea  Negative for shortness of breath, wheezing and stridor  Cardiovascular: Negative for chest pain, palpitations and leg swelling  Gastrointestinal: Negative for abdominal pain, anal bleeding and blood in stool  Endocrine: Negative for cold intolerance  Genitourinary: Negative for hematuria  Musculoskeletal: Negative for arthralgias and gait problem  Skin: Negative for pallor and rash  Allergic/Immunologic: Negative for immunocompromised state  Neurological: Negative for syncope, speech difficulty and weakness  Hematological: Does not bruise/bleed easily  Psychiatric/Behavioral: Positive for sleep disturbance  The patient is not nervous/anxious  Physical Exam:  Physical Exam  Vitals reviewed  Constitutional:       General: He is not in acute distress  Appearance: Normal appearance  He is normal weight  He is not ill-appearing, toxic-appearing or diaphoretic  Eyes:      General: No scleral icterus  Neck:      Vascular: No carotid bruit  Cardiovascular:      Rate and Rhythm: Regular rhythm  Bradycardia present  Pulses: Normal pulses  Heart sounds: Normal heart sounds  No murmur heard  No friction rub  No gallop  Pulmonary:      Effort: Pulmonary effort is normal  No respiratory distress  Breath sounds: No stridor  No wheezing, rhonchi or rales  Musculoskeletal:      Right lower leg: No edema  Left lower leg: No edema  Skin:     General: Skin is warm and dry        Capillary Refill: Capillary refill takes less than 2 seconds  Coloration: Skin is not jaundiced  Findings: No bruising or erythema  Neurological:      Mental Status: He is alert and oriented to person, place, and time  Psychiatric:         Mood and Affect: Mood normal          Discussion/Summary:  Coronary artery disease, PTCA/drug stent of the RCA 3 stents plus stage revascularization of the LAD in 2013  Echocardiogram last year revealed normal left systolic function with mild aortic and tricuspid insufficiency, upper limits of normal pulmonary pressures suggested by Doppler criteria  Normal diastolic parameters  Stress test at that time he did 7 minutes of Shayan protocol, he did achieve target heart rate with adequate chronotropic competence at achieving 95% maximum predicted heart rate for age, there was no EKG criteria for ischemia or symptoms  Lipids are well control  Sick sinus syndrome, ventricular ectopy  Holter monitor last year revealed resting sinus bradycardia average of 47 range from 33-92, over 5000 PVCs including trigeminy but no sustained tachyarrhythmias or ventricular tachycardia  He is not on beta-blocker because of that despite his CAD  Borderline indication for pacemaker  He has remained clinically electrographically in sinus rhythm, does have history of radiofrequency ablation cryoablation for atrial fibrillation/flutter 2014  This note was completed in part utilizing [employer] direct voice recognition software  Grammatical errors, random word insertion, spelling mistakes, and incomplete sentences may be an occasional consequence of the system secondary to software limitations, ambient noise and hardware issues  At the time of dictation, efforts were made to edit, clarify and /or correct errors  Please read the chart carefully and recognize, using context, where substitutions have occurred    If you have any questions or concerns about the context, text or information contained within the body of this dictation, please contact myself, the provider, for further clarification  Home

## 2025-02-14 NOTE — ED PROVIDER NOTE - NSFOLLOWUPINSTRUCTIONS_ED_ALL_ED_FT
take the medication as prescribed  - check your fingerstick every 12 hours  -: Follow-up with your primary care doctor within 1 or 2 days   please: Follow-up with gastroenterology.-  - avoid taking spicy food  Gastritis    Gastritis is soreness and swelling (inflammation) of the lining of the stomach. Gastritis can develop as a sudden onset (acute) or long-term (chronic) condition. If gastritis is not treated, it can lead to stomach bleeding and ulcers. Causes include viral and bacterial infections, excessive alcohol consumption, tobacco use, or certain medications. Symptoms include nausea, vomiting, or abdominal pain or burning especially after eating. Avoid foods or drinks that make your symptoms worse such as caffeine, chocolate, spicy foods, acidic foods, or alcohol.    SEEK IMMEDIATE MEDICAL CARE IF YOU HAVE ANY OF THE FOLLOWING SYMPTOMS: black or bloody stools, blood or coffee-ground-colored vomitus, worsening abdominal pain, fever, or inability to keep fluids down.

## 2025-02-14 NOTE — ED PROVIDER NOTE - ATTENDING APP SHARED VISIT CONTRIBUTION OF CARE
63 YO M pmh DM, HT, HLD, GERD, pituitary tumor with recent Resection November 2024 presents with 1 month of abdominal pain, worse at night, described as burning in nature.  States sometimes he does not feel the pain during the day.  Some associated nausea but no emesis.  No fevers.  Has been unable to check his blood sugar as he states he ran out of glucometer strips.  Also endorsing some dysuria but no penile lesions or discharge.  No chest pain.  On exam vital signs noted, patient is well-appearing, abdomen soft and nontender, no CVA tenderness, heart rate regular no appreciable murmurs rubs or gallops.  EKG normal sinus rhythm with T wave inversions in leads I, 2, aVL, aVF, V3, V4, V5, V6.  This is completely unchanged compared to previous.  Abdominal pain only at night is not consistent with cardiac etiology, EKG unchanged.  Patient is requesting H. pylori testing/"acid testing".  Informed patient this is not testing that can be performed in the emergency department but if workup is negative will refer to GI for possible endoscopy/colonoscopy.  Will check labs as well as UA will check CT to evaluate for acute intra-abdominal or intrapelvic pathology given patient's age and past medical history.  Dispo and further interventions pending

## 2025-02-14 NOTE — ED PROVIDER NOTE - CARE PROVIDER_API CALL
Bernard Roth  Gastroenterology  [address], [address]  [place] [address]  Phone: [phone]  Fax: [phone]  Follow Up Time: 4-6 Days

## 2025-02-14 NOTE — ED PROVIDER NOTE - OBJECTIVE STATEMENT
64 Male with PMH of DM, HTN, HLD, GERD, known history of pituitary tumor diagnosed 9 years ago in [facility]-Presented emergency room complaining of epigastric abdominal pain feeling acid reflux and pain when he is laying flat on and off for past month. denies any nausea vomiting but he feels constipated as well. unable to take his sugar because he is run out of glucometer/strip . he is requesting to get H. pylori to be tested in the emergency room. did not follow-up with his primary care doctor. admitted pain after urinations as well but denies any fever or chills at home. denies any other complaint or concern

## 2025-02-14 NOTE — ED PROVIDER NOTE - CLINICAL SUMMARY MEDICAL DECISION MAKING FREE TEXT BOX
64 Male with PMH of DM, HTN, HLD, GERD, known history of pituitary tumor diagnosed 9 years ago in [facility]-Presented emergency room complaining of epigastric abdominal pain feeling acid reflux and pain when he is laying flat on and off for past month. denies any nausea vomiting but he feels constipated as well. unable to take his sugar because he is run out of glucometer/strip . he is requesting to get H. pylori to be tested in the emergency room. did not follow-up with his primary care doctor. admitted pain after urinations as well but denies any fever or chills at home. denies any other complaint or concern    - rule out gastritis, colitis, UTI,  DKA  - check further labs VBG IV fluids GI cocktails CT abdomen pelvis with IV contrast 64 Male with PMH of DM, HTN, HLD, GERD, known history of pituitary tumor diagnosed 9 years ago in [facility]-Presented emergency room complaining of epigastric abdominal pain feeling acid reflux and pain when he is laying flat on and off for past month. denies any nausea vomiting but he feels constipated as well. unable to take his sugar because he is run out of glucometer/strip . he is requesting to get H. pylori to be tested in the emergency room. did not follow-up with his primary care doctor. admitted pain after urinations as well but denies any fever or chills at home. denies any other complaint or concern    - rule out gastritis, colitis, UTI,  DKA  - check further labs VBG IV fluids GI cocktails CT abdomen pelvis with IV contrast    STEPHANIE Sanchez: received signout from STEPHANIE Martinez pending rpt lactate. lactate still elevated but improved. likely elevated due to metformin. pt reported feeling well. ambulatory in ED and tolerating PO. discussed supportive care measures and return precautions. advised to f/u with pcp. pt verbalized understanding and agreement

## 2025-02-14 NOTE — ED PROVIDER NOTE - PATIENT PORTAL LINK FT
You can access the FollowMyHealth Patient Portal offered by [facility] by registering at the following website: [email or web address]. By joining [employer]’s FollowMyHealth portal, you will also be able to view your health information using other applications (apps) compatible with our system.

## 2025-02-14 NOTE — ED ADULT NURSE NOTE - OBJECTIVE STATEMENT
Pt A&OX4. Came in w/ c/o generalized abdominal discomfort worsened after eating and painful urination. Denies N/V/D, fevers, chills, body aches. VS stable, RR even and unlabored, safety maintained.

## 2025-02-14 NOTE — ED PROVIDER NOTE - PHYSICAL EXAMINATION
Const: AOX3 nontoxic appearing, no apparent respiratory or physical distress. Stable gait   HEENT: NC/AT. Moist mucous membranes.  Eyes: CRUZ. EOMI  Neck: Soft and supple. Full ROM without pain.  Cardiac: Regular rate and regular rhythm. +S1/S2. No LE edema.  Resp: Speaking in full sentences. No evidence of respiratory distress. No wheezes, rales or rhonchi. No adventitious breath sounds   Abd: Soft, non-tender, non-distended. Normal bowel sounds in all 4 quadrants. No guarding or rebound.  Back: Spine midline and non-tender. No CVAT.  Neuro: Awake, alert & oriented x 3. Moves all extremities symmetrically.

## 2025-02-14 NOTE — ED PROVIDER NOTE - NSICDXNOPASTSURGICALHX_GEN_ALL_ED
Urinary Retention with Difficult muller placement
<-- Click to add NO significant Past Surgical History

## 2025-02-15 VITALS
HEART RATE: 66 BPM | TEMPERATURE: 98 F | OXYGEN SATURATION: 97 % | SYSTOLIC BLOOD PRESSURE: 167 MMHG | DIASTOLIC BLOOD PRESSURE: 89 MMHG | RESPIRATION RATE: 16 BRPM

## 2025-02-15 LAB
CULTURE RESULTS: SIGNIFICANT CHANGE UP
GAS PNL BLDV: SIGNIFICANT CHANGE UP
SPECIMEN SOURCE: SIGNIFICANT CHANGE UP

## 2025-02-15 PROCEDURE — 82435 ASSAY OF BLOOD CHLORIDE: CPT

## 2025-02-15 PROCEDURE — 93005 ELECTROCARDIOGRAM TRACING: CPT

## 2025-02-15 PROCEDURE — 84295 ASSAY OF SERUM SODIUM: CPT

## 2025-02-15 PROCEDURE — 87086 URINE CULTURE/COLONY COUNT: CPT

## 2025-02-15 PROCEDURE — 74177 CT ABD & PELVIS W/CONTRAST: CPT | Mod: MC

## 2025-02-15 PROCEDURE — 85025 COMPLETE CBC W/AUTO DIFF WBC: CPT

## 2025-02-15 PROCEDURE — 83690 ASSAY OF LIPASE: CPT

## 2025-02-15 PROCEDURE — 80053 COMPREHEN METABOLIC PANEL: CPT

## 2025-02-15 PROCEDURE — 82330 ASSAY OF CALCIUM: CPT

## 2025-02-15 PROCEDURE — 84132 ASSAY OF SERUM POTASSIUM: CPT

## 2025-02-15 PROCEDURE — 82947 ASSAY GLUCOSE BLOOD QUANT: CPT

## 2025-02-15 PROCEDURE — 99285 EMERGENCY DEPT VISIT HI MDM: CPT | Mod: 25

## 2025-02-15 PROCEDURE — 85018 HEMOGLOBIN: CPT

## 2025-02-15 PROCEDURE — 36415 COLL VENOUS BLD VENIPUNCTURE: CPT

## 2025-02-15 PROCEDURE — 85014 HEMATOCRIT: CPT

## 2025-02-15 PROCEDURE — 96374 THER/PROPH/DIAG INJ IV PUSH: CPT | Mod: XU

## 2025-02-15 PROCEDURE — 81003 URINALYSIS AUTO W/O SCOPE: CPT

## 2025-02-15 PROCEDURE — 82962 GLUCOSE BLOOD TEST: CPT

## 2025-02-15 PROCEDURE — 83605 ASSAY OF LACTIC ACID: CPT

## 2025-02-15 PROCEDURE — 82803 BLOOD GASES ANY COMBINATION: CPT

## 2025-02-28 ENCOUNTER — APPOINTMENT (OUTPATIENT)
Dept: GASTROENTEROLOGY | Facility: CLINIC | Age: 65
End: 2025-02-28

## 2025-03-06 ENCOUNTER — APPOINTMENT (OUTPATIENT)
Dept: GASTROENTEROLOGY | Facility: CLINIC | Age: 65
End: 2025-03-06
Payer: MEDICAID

## 2025-03-06 ENCOUNTER — NON-APPOINTMENT (OUTPATIENT)
Age: 65
End: 2025-03-06

## 2025-03-06 VITALS
HEART RATE: 69 BPM | SYSTOLIC BLOOD PRESSURE: 170 MMHG | DIASTOLIC BLOOD PRESSURE: 95 MMHG | OXYGEN SATURATION: 97 % | WEIGHT: 208 LBS | BODY MASS INDEX: 29.12 KG/M2 | HEIGHT: 71 IN | RESPIRATION RATE: 16 BRPM

## 2025-03-06 DIAGNOSIS — R10.13 EPIGASTRIC PAIN: ICD-10-CM

## 2025-03-06 DIAGNOSIS — K59.09 OTHER CONSTIPATION: ICD-10-CM

## 2025-03-06 DIAGNOSIS — R12 HEARTBURN: ICD-10-CM

## 2025-03-06 DIAGNOSIS — Z12.11 ENCOUNTER FOR SCREENING FOR MALIGNANT NEOPLASM OF COLON: ICD-10-CM

## 2025-03-06 DIAGNOSIS — D35.2 BENIGN NEOPLASM OF PITUITARY GLAND: ICD-10-CM

## 2025-03-06 PROCEDURE — 99204 OFFICE O/P NEW MOD 45 MIN: CPT

## 2025-03-06 RX ORDER — POLYETHYLENE GLYCOL 1000
POWDER (GRAM) MISCELLANEOUS
Refills: 0 | Status: ACTIVE | COMMUNITY

## 2025-03-06 RX ORDER — METFORMIN HYDROCHLORIDE 500 MG/1
500 TABLET, COATED ORAL
Refills: 0 | Status: ACTIVE | COMMUNITY

## 2025-03-06 RX ORDER — FAMOTIDINE 10 MG/1
TABLET, FILM COATED ORAL
Refills: 0 | Status: ACTIVE | COMMUNITY

## 2025-03-06 RX ORDER — LUBIPROSTONE 24 UG/1
24 CAPSULE ORAL TWICE DAILY
Qty: 60 | Refills: 5 | Status: ACTIVE | COMMUNITY
Start: 2025-03-06 | End: 1900-01-01

## 2025-03-06 RX ORDER — METOPROLOL TARTRATE 50 MG/1
50 TABLET ORAL
Refills: 0 | Status: ACTIVE | COMMUNITY

## 2025-03-06 RX ORDER — TAMSULOSIN HYDROCHLORIDE 0.4 MG/1
0.4 CAPSULE ORAL
Refills: 0 | Status: ACTIVE | COMMUNITY

## 2025-03-06 RX ORDER — SENNOSIDES 8.6 MG/1
CAPSULE, GELATIN COATED ORAL
Refills: 0 | Status: ACTIVE | COMMUNITY

## 2025-03-06 RX ORDER — OXYCODONE 5 MG/1
5 TABLET ORAL
Refills: 0 | Status: ACTIVE | COMMUNITY

## 2025-03-06 RX ORDER — HYDROCORTISONE 10 MG/1
10 TABLET ORAL
Refills: 0 | Status: ACTIVE | COMMUNITY

## 2025-03-06 RX ORDER — CALCIUM CARBONATE, MAGNESIUM HYDROXIDE 400; 135 MG/5ML; MG/5ML
400-135 SUSPENSION ORAL
Refills: 0 | Status: ACTIVE | COMMUNITY

## 2025-03-06 RX ORDER — POLYETHYLENE GLYCOL-3350, SODIUM CHLORIDE, POTASSIUM CHLORIDE AND SODIUM BICARBONATE 420; 11.2; 5.72; 1.48 G/438.4G; G/438.4G; G/438.4G; G/438.4G
420 POWDER, FOR SOLUTION ORAL
Qty: 1 | Refills: 0 | Status: ACTIVE | COMMUNITY
Start: 2025-03-06 | End: 1900-01-01

## 2025-03-20 ENCOUNTER — APPOINTMENT (OUTPATIENT)
Dept: NEUROSURGERY | Facility: CLINIC | Age: 65
End: 2025-03-20

## 2025-03-21 ENCOUNTER — APPOINTMENT (OUTPATIENT)
Dept: OTOLARYNGOLOGY | Facility: CLINIC | Age: 65
End: 2025-03-21
Payer: MEDICAID

## 2025-03-21 VITALS
HEIGHT: 71 IN | WEIGHT: 208 LBS | SYSTOLIC BLOOD PRESSURE: 170 MMHG | HEART RATE: 62 BPM | DIASTOLIC BLOOD PRESSURE: 82 MMHG | BODY MASS INDEX: 29.12 KG/M2

## 2025-03-21 DIAGNOSIS — D35.2 BENIGN NEOPLASM OF PITUITARY GLAND: ICD-10-CM

## 2025-03-21 PROCEDURE — 99213 OFFICE O/P EST LOW 20 MIN: CPT | Mod: 25

## 2025-03-21 PROCEDURE — 31231 NASAL ENDOSCOPY DX: CPT

## 2025-04-11 ENCOUNTER — OUTPATIENT (OUTPATIENT)
Dept: OUTPATIENT SERVICES | Facility: HOSPITAL | Age: 65
LOS: 1 days | End: 2025-04-11
Payer: MEDICAID

## 2025-04-11 ENCOUNTER — APPOINTMENT (OUTPATIENT)
Dept: MRI IMAGING | Facility: CLINIC | Age: 65
End: 2025-04-11
Payer: MEDICAID

## 2025-04-11 DIAGNOSIS — D35.2 BENIGN NEOPLASM OF PITUITARY GLAND: ICD-10-CM

## 2025-04-11 PROCEDURE — A9585: CPT

## 2025-04-11 PROCEDURE — 70553 MRI BRAIN STEM W/O & W/DYE: CPT | Mod: 26

## 2025-04-11 PROCEDURE — 70553 MRI BRAIN STEM W/O & W/DYE: CPT

## 2025-05-01 ENCOUNTER — APPOINTMENT (OUTPATIENT)
Dept: NEUROSURGERY | Facility: CLINIC | Age: 65
End: 2025-05-01
Payer: MEDICAID

## 2025-05-01 VITALS
DIASTOLIC BLOOD PRESSURE: 92 MMHG | WEIGHT: 208 LBS | OXYGEN SATURATION: 97 % | HEIGHT: 71 IN | BODY MASS INDEX: 29.12 KG/M2 | TEMPERATURE: 98.4 F | HEART RATE: 62 BPM | SYSTOLIC BLOOD PRESSURE: 170 MMHG

## 2025-05-01 DIAGNOSIS — D35.2 BENIGN NEOPLASM OF PITUITARY GLAND: ICD-10-CM

## 2025-05-01 PROCEDURE — 99214 OFFICE O/P EST MOD 30 MIN: CPT

## 2025-05-23 ENCOUNTER — APPOINTMENT (OUTPATIENT)
Dept: GASTROENTEROLOGY | Facility: GI CENTER | Age: 65
End: 2025-05-23

## 2025-05-23 ENCOUNTER — OUTPATIENT (OUTPATIENT)
Dept: OUTPATIENT SERVICES | Facility: HOSPITAL | Age: 65
LOS: 1 days | End: 2025-05-23
Payer: COMMERCIAL

## 2025-05-23 ENCOUNTER — TRANSCRIPTION ENCOUNTER (OUTPATIENT)
Age: 65
End: 2025-05-23

## 2025-05-23 DIAGNOSIS — Z12.11 ENCOUNTER FOR SCREENING FOR MALIGNANT NEOPLASM OF COLON: ICD-10-CM

## 2025-05-23 DIAGNOSIS — R12 HEARTBURN: ICD-10-CM

## 2025-05-23 DIAGNOSIS — R10.13 EPIGASTRIC PAIN: ICD-10-CM

## 2025-05-23 DIAGNOSIS — K59.09 OTHER CONSTIPATION: ICD-10-CM

## 2025-05-23 LAB — GLUCOSE BLDC GLUCOMTR-MCNC: 167 MG/DL — HIGH (ref 70–99)

## 2025-05-23 PROCEDURE — 88342 IMHCHEM/IMCYTCHM 1ST ANTB: CPT | Mod: 26

## 2025-05-23 PROCEDURE — 43239 EGD BIOPSY SINGLE/MULTIPLE: CPT | Mod: 59

## 2025-05-23 PROCEDURE — 88341 IMHCHEM/IMCYTCHM EA ADD ANTB: CPT | Mod: 26

## 2025-05-23 PROCEDURE — 88305 TISSUE EXAM BY PATHOLOGIST: CPT

## 2025-05-23 PROCEDURE — 88305 TISSUE EXAM BY PATHOLOGIST: CPT | Mod: 26

## 2025-05-23 PROCEDURE — 88341 IMHCHEM/IMCYTCHM EA ADD ANTB: CPT

## 2025-05-23 PROCEDURE — 43239 EGD BIOPSY SINGLE/MULTIPLE: CPT

## 2025-05-23 PROCEDURE — 82962 GLUCOSE BLOOD TEST: CPT

## 2025-05-23 PROCEDURE — 45385 COLONOSCOPY W/LESION REMOVAL: CPT | Mod: 33

## 2025-05-23 PROCEDURE — 45380 COLONOSCOPY AND BIOPSY: CPT | Mod: PT

## 2025-05-23 PROCEDURE — 88342 IMHCHEM/IMCYTCHM 1ST ANTB: CPT

## 2025-06-02 LAB — SURGICAL PATHOLOGY STUDY: SIGNIFICANT CHANGE UP

## 2025-06-30 ENCOUNTER — NON-APPOINTMENT (OUTPATIENT)
Age: 65
End: 2025-06-30

## (undated) DEVICE — MEDICATION LABELS W MARKER

## (undated) DEVICE — ELCTR PEDICLE SCREW PROBE 3MM BALL 1.8MM X 100MM

## (undated) DEVICE — STORZ DURA MICRO KNIFE INSERT POINTED

## (undated) DEVICE — BUR MEDTRONIC ENT TRANSNASAL SKULL BASE DIAMOND ROUND 15 DEGREE 4.5MM X 13CM

## (undated) DEVICE — STAPLER SKIN PROXIMATE

## (undated) DEVICE — BIPOLAR FORCEP SYMMETRY BAYONET 7" X 1.5MM SMOOTH (SILVER)

## (undated) DEVICE — Device

## (undated) DEVICE — DRSG TELFA 3 X 8

## (undated) DEVICE — DRAPE 1/2 SHEET 40X57"

## (undated) DEVICE — SNAP ON SPHERZ 5 PACK

## (undated) DEVICE — ELCTR BOVIE TIP NEEDLE INSULATED 2.8" EDGE

## (undated) DEVICE — ELCTR 4-DISC 20MM 49" (RED, BLUE, GREEN, BLACK)

## (undated) DEVICE — PACK NEURO

## (undated) DEVICE — FRAZIER SUCTION TIP 10FR

## (undated) DEVICE — ELCTR MONOPOLAR STIMULATOR PROBE FLUSH-TIP

## (undated) DEVICE — ELCTR SUBDERMAL CORKSCREW NDL 1.2MM

## (undated) DEVICE — DRSG NASOPORE 4CM FIRM

## (undated) DEVICE — ELCTR ROCKER SWITCH PENCIL BLUE 10FT

## (undated) DEVICE — FORCEP ENDOJAW AGTR LC W NDL 2.8MM 230CM DISP

## (undated) DEVICE — DRAPE INSTRUMENT POUCH 6.75" X 11"

## (undated) DEVICE — SOL ANTI FOG

## (undated) DEVICE — MARKING PEN W RULER

## (undated) DEVICE — KIT DEFENDO 4 OLY 4 PC

## (undated) DEVICE — WARMING BLANKET LOWER ADULT

## (undated) DEVICE — CLEANING SHEATH ENDO-SCRUB FOR STORZ 7210AA TELESCOPE 4MM 0 DEGREE

## (undated) DEVICE — ELCTR SUBDERMAL NDL CLASSIC 1.5M X 59" (6 COLOR)

## (undated) DEVICE — APPLICATOR EXTENDED TIP 8CM

## (undated) DEVICE — DRAPE 3/4 SHEET W REINFORCEMENT 56X77"

## (undated) DEVICE — SOL IRR POUR NS 0.9% 1000ML

## (undated) DEVICE — FORCEP RADIAL JAW 4 240CM DISP

## (undated) DEVICE — POSITIONER FOAM EGG CRATE ULNAR 2PCS (PINK)

## (undated) DEVICE — ELCTR SUBDERMAL NDL 27G X 1/2" WITH TWISTED PAIR

## (undated) DEVICE — BLADE MEDTRONIC ENT TRICUT ROTATABLE STRAIGHT 4MM X 11CM

## (undated) DEVICE — SPECIMEN CONTAINER 100ML

## (undated) DEVICE — SOL IRR POUR H2O 1000ML

## (undated) DEVICE — FOLEY TRAY 16FR 5CC LTX UMETER CLOSED

## (undated) DEVICE — SYR LUER LOK 10CC

## (undated) DEVICE — ELCTR BOVIE TIP BLADE INSULATED 4" EDGE

## (undated) DEVICE — DRSG MEROCEL STANDARD NO STRING 8CM X 2CM

## (undated) DEVICE — GOWN TRIMAX LG

## (undated) DEVICE — DRAPE SPLIT SHEET 77" X 108"

## (undated) DEVICE — ELCTR BIPOLAR PROBE

## (undated) DEVICE — GLV 7 PROTEXIS (WHITE)

## (undated) DEVICE — TUBING SUCTION 20FT

## (undated) DEVICE — VENODYNE/SCD SLEEVE CALF LARGE

## (undated) DEVICE — DRSG 4 X 8"

## (undated) DEVICE — STORZ DURA MICRO KNIFE INSERT SICKLE-SHAPED